# Patient Record
Sex: FEMALE | Race: WHITE | Employment: PART TIME | URBAN - METROPOLITAN AREA
[De-identification: names, ages, dates, MRNs, and addresses within clinical notes are randomized per-mention and may not be internally consistent; named-entity substitution may affect disease eponyms.]

---

## 2017-01-23 ENCOUNTER — TELEPHONE (OUTPATIENT)
Dept: HEMATOLOGY/ONCOLOGY | Facility: HOSPITAL | Age: 62
End: 2017-01-23

## 2017-01-23 NOTE — TELEPHONE ENCOUNTER
Patient phoned requesting apt with radiation oncologist. Assisted with Scheduling for 1/26. Patient wanted to wait until the holidays were over to schedule apt for radiation doctor. Patient instructed to call with any further questions or concerns.

## 2017-01-26 ENCOUNTER — HOSPITAL ENCOUNTER (OUTPATIENT)
Dept: RADIATION ONCOLOGY | Facility: HOSPITAL | Age: 62
Discharge: HOME | End: 2017-01-26
Attending: RADIOLOGY
Payer: COMMERCIAL

## 2017-01-26 VITALS
TEMPERATURE: 98 F | SYSTOLIC BLOOD PRESSURE: 156 MMHG | BODY MASS INDEX: 23 KG/M2 | DIASTOLIC BLOOD PRESSURE: 71 MMHG | OXYGEN SATURATION: 98 % | HEART RATE: 72 BPM | WEIGHT: 128.31 LBS

## 2017-01-26 DIAGNOSIS — D05.12 DUCTAL CARCINOMA IN SITU (DCIS) OF LEFT BREAST: Primary | ICD-10-CM

## 2017-01-26 PROCEDURE — 99214 OFFICE O/P EST MOD 30 MIN: CPT

## 2017-01-26 NOTE — PROGRESS NOTES
Nursing Consultation Note  Patient: Ru Gerber  YOB: 1955  Age: 63year old  Radiation Oncologist: Dr. Rogene Runner  Referring Physician: Obed Mcgregor  Diagnosis:Left breast DCIS  Consult Date: 1/26/2017    History of Pres Breast Cancer Mother 28   • Breast Cancer Sister 52   • Cancer Maternal Grandmother      unknown     Medical History:  Past Medical History   Diagnosis Date   • Dry eye 10/30/2014   • Smoker 10/30/2014   • Cancer [facility])      Cancer - left breast   • Hearing Negative. HENT: Negative. Eyes: Negative. Respiratory: Negative. Cardiovascular: Negative. Gastrointestinal: Negative. Genitourinary: Negative. Musculoskeletal: Negative. Skin: Negative. Neurological: Negative.     Endo/Heme/Aller

## 2017-01-26 NOTE — PATIENT INSTRUCTIONS
- FOLLOW-UP WITH Dr Maddi Barnett as planned    - CT SIMULATION SCHEDULED FOR next week- Thursday 2/2/17 @ 10:00 am.    This is the first step in the radiation planning process    - IF YOU 25 [facility], [address] [place] [address] [phone] RN LINE.

## 2017-01-26 NOTE — CONSULTS
[facility]    PATIENT'S NAME: Amelie Vivarer   RADIATION ONCOLOGIST: Romaan Rodriguez. Og Alejo M.D.    PATIENT ACCOUNT #: [de-identified] Curt Smith   [facility]   MEDICAL RECORD #: [id number] YOB: 1955   CONSULTATION DATE: 01/26/2017       RADIA loss, changes in appetite, bone pain, headaches, nausea, vomiting, or other similar complaints. PAST MEDICAL HISTORY:  Patient has no pertinent past medical history.     PAST SURGICAL HISTORY:  Breast biopsies in the past and the aforementioned lumpectom point.  There is no palpable masses, skin thickening, or nipple discharge within the left breast.  The right breast is also clinically negative.     IMPRESSION:  This is a 70-year-old white female with a history of ductal carcinoma in situ of the left breas pathologic results. From a treatment standpoint, I anticipate using the prone positioning for her treatment.   Though she is rather slender, she does have enough breast tissue that it does make sense to pull the breast away from the chest wall with Laila Amanda

## 2017-02-01 ENCOUNTER — HOSPITAL ENCOUNTER (OUTPATIENT)
Dept: RADIATION ONCOLOGY | Facility: HOSPITAL | Age: 62
Discharge: HOME | End: 2017-02-01
Attending: RADIOLOGY
Payer: COMMERCIAL

## 2017-02-02 ENCOUNTER — HOSPITAL ENCOUNTER (OUTPATIENT)
Dept: RADIATION ONCOLOGY | Facility: HOSPITAL | Age: 62
Discharge: HOME | End: 2017-02-02
Attending: RADIOLOGY
Payer: COMMERCIAL

## 2017-02-02 PROCEDURE — 77290 THER RAD SIMULAJ FIELD CPLX: CPT | Performed by: RADIOLOGY

## 2017-02-02 PROCEDURE — 77333 RADIATION TREATMENT AID(S): CPT | Performed by: RADIOLOGY

## 2017-02-08 PROCEDURE — 77295 3-D RADIOTHERAPY PLAN: CPT | Performed by: RADIOLOGY

## 2017-02-08 PROCEDURE — 77300 RADIATION THERAPY DOSE PLAN: CPT | Performed by: RADIOLOGY

## 2017-02-08 PROCEDURE — 77334 RADIATION TREATMENT AID(S): CPT | Performed by: RADIOLOGY

## 2017-02-13 ENCOUNTER — HOSPITAL ENCOUNTER (OUTPATIENT)
Dept: RADIATION ONCOLOGY | Facility: HOSPITAL | Age: 62
Discharge: HOME | End: 2017-02-13
Attending: RADIOLOGY
Payer: COMMERCIAL

## 2017-02-13 PROCEDURE — 77412 RADIATION TX DELIVERY LVL 3: CPT | Performed by: RADIOLOGY

## 2017-02-13 PROCEDURE — 77280 THER RAD SIMULAJ FIELD SMPL: CPT | Performed by: RADIOLOGY

## 2017-02-13 NOTE — PROGRESS NOTES
[facility] Radiation Treatment Management Note 1-5    Patient:  Amador Fraser  Age:  63year old  Visit Diagnosis:  No diagnosis found.   Primary Rad/Onc:  Dr. Yamilet Langford      Site Delivered Dose (Gy) Prescribed Dose (Gy) Margarito Neal

## 2017-02-14 PROCEDURE — 77412 RADIATION TX DELIVERY LVL 3: CPT | Performed by: RADIOLOGY

## 2017-02-15 PROCEDURE — 77412 RADIATION TX DELIVERY LVL 3: CPT | Performed by: RADIOLOGY

## 2017-02-15 PROCEDURE — 77417 THER RADIOLOGY PORT IMAGE(S): CPT | Performed by: RADIOLOGY

## 2017-02-16 PROCEDURE — 77290 THER RAD SIMULAJ FIELD CPLX: CPT | Performed by: RADIOLOGY

## 2017-02-16 PROCEDURE — 77412 RADIATION TX DELIVERY LVL 3: CPT | Performed by: RADIOLOGY

## 2017-02-17 PROCEDURE — 77336 RADIATION PHYSICS CONSULT: CPT | Performed by: RADIOLOGY

## 2017-02-17 PROCEDURE — 77412 RADIATION TX DELIVERY LVL 3: CPT | Performed by: RADIOLOGY

## 2017-02-20 ENCOUNTER — HOSPITAL ENCOUNTER (OUTPATIENT)
Dept: RADIATION ONCOLOGY | Facility: HOSPITAL | Age: 62
Discharge: HOME | End: 2017-02-20
Attending: RADIOLOGY
Payer: COMMERCIAL

## 2017-02-20 VITALS
DIASTOLIC BLOOD PRESSURE: 70 MMHG | TEMPERATURE: 98 F | HEART RATE: 80 BPM | OXYGEN SATURATION: 95 % | SYSTOLIC BLOOD PRESSURE: 122 MMHG

## 2017-02-20 DIAGNOSIS — D05.12 DUCTAL CARCINOMA IN SITU (DCIS) OF LEFT BREAST: Primary | ICD-10-CM

## 2017-02-20 PROCEDURE — 77412 RADIATION TX DELIVERY LVL 3: CPT | Performed by: RADIOLOGY

## 2017-02-20 NOTE — PROGRESS NOTES
[facility] Radiation Treatment Management Note 6-10    Patient:  Ru Gerber  Age:  63year old  Visit Diagnosis:    1.  Ductal carcinoma in situ (DCIS) of left breast      Primary Rad/Onc:  Dr. Rogene Runner      Site Delivered D

## 2017-02-21 PROCEDURE — 77412 RADIATION TX DELIVERY LVL 3: CPT | Performed by: RADIOLOGY

## 2017-02-22 PROCEDURE — 77412 RADIATION TX DELIVERY LVL 3: CPT | Performed by: RADIOLOGY

## 2017-02-23 PROCEDURE — 77417 THER RADIOLOGY PORT IMAGE(S): CPT | Performed by: RADIOLOGY

## 2017-02-23 PROCEDURE — 77412 RADIATION TX DELIVERY LVL 3: CPT | Performed by: RADIOLOGY

## 2017-02-24 PROCEDURE — 77336 RADIATION PHYSICS CONSULT: CPT | Performed by: RADIOLOGY

## 2017-02-24 PROCEDURE — 77412 RADIATION TX DELIVERY LVL 3: CPT | Performed by: RADIOLOGY

## 2017-02-27 ENCOUNTER — HOSPITAL ENCOUNTER (OUTPATIENT)
Dept: RADIATION ONCOLOGY | Facility: HOSPITAL | Age: 62
Discharge: HOME | End: 2017-02-27
Attending: RADIOLOGY
Payer: COMMERCIAL

## 2017-02-27 VITALS
TEMPERATURE: 99 F | RESPIRATION RATE: 20 BRPM | HEART RATE: 71 BPM | SYSTOLIC BLOOD PRESSURE: 142 MMHG | DIASTOLIC BLOOD PRESSURE: 77 MMHG

## 2017-02-27 DIAGNOSIS — D05.12 DUCTAL CARCINOMA IN SITU (DCIS) OF LEFT BREAST: Primary | ICD-10-CM

## 2017-02-27 PROCEDURE — 77412 RADIATION TX DELIVERY LVL 3: CPT | Performed by: RADIOLOGY

## 2017-02-27 NOTE — PROGRESS NOTES
[facility] Radiation Treatment Management Note 11-15    Patient:  Keya Pablo  Age:  63year old  Visit Diagnosis:      1.  Ductal carcinoma in situ (DCIS) of left breast      Primary Rad/Onc:  Dr. Alaina Salvador

## 2017-02-28 PROCEDURE — 77412 RADIATION TX DELIVERY LVL 3: CPT | Performed by: RADIOLOGY

## 2017-03-01 ENCOUNTER — HOSPITAL ENCOUNTER (OUTPATIENT)
Dept: RADIATION ONCOLOGY | Facility: HOSPITAL | Age: 62
Discharge: HOME | End: 2017-03-01
Attending: RADIOLOGY
Payer: COMMERCIAL

## 2017-03-01 PROCEDURE — 77412 RADIATION TX DELIVERY LVL 3: CPT | Performed by: RADIOLOGY

## 2017-03-02 PROCEDURE — 77417 THER RADIOLOGY PORT IMAGE(S): CPT | Performed by: RADIOLOGY

## 2017-03-02 PROCEDURE — 77412 RADIATION TX DELIVERY LVL 3: CPT | Performed by: RADIOLOGY

## 2017-03-03 PROCEDURE — 77412 RADIATION TX DELIVERY LVL 3: CPT | Performed by: RADIOLOGY

## 2017-03-03 NOTE — PATIENT INSTRUCTIONS
MAKE AN APPOINTMENT TO SEE DR FÁTIMA MCNEILL IN 1 MONTH, April 2017     CALL TO SCHEDULE AN [facility]MENT [facility] CAN[facility]SH[facility] [phone]    FOLLOW UP WITH DR Bony Franklin IN 6 MONTHS AUGUST 2017: LEFT SIDED MAMMOGRAM TO BE DONE IN

## 2017-03-06 ENCOUNTER — HOSPITAL ENCOUNTER (OUTPATIENT)
Dept: RADIATION ONCOLOGY | Facility: HOSPITAL | Age: 62
Discharge: HOME | End: 2017-03-06
Attending: RADIOLOGY
Payer: COMMERCIAL

## 2017-03-06 VITALS
SYSTOLIC BLOOD PRESSURE: 145 MMHG | HEART RATE: 64 BPM | TEMPERATURE: 99 F | DIASTOLIC BLOOD PRESSURE: 71 MMHG | RESPIRATION RATE: 20 BRPM

## 2017-03-06 DIAGNOSIS — D05.12 DUCTAL CARCINOMA IN SITU (DCIS) OF LEFT BREAST: Primary | ICD-10-CM

## 2017-03-06 PROCEDURE — 77412 RADIATION TX DELIVERY LVL 3: CPT | Performed by: RADIOLOGY

## 2017-03-06 NOTE — PROGRESS NOTES
[facility] Radiation Treatment Management Note 16-20    Patient:  Octavio Gleason  Age:  63year old  Visit Diagnosis:      1.  Ductal carcinoma in situ (DCIS) of left breast      Primary Rad/Onc:  Dr. Arnulfo Maldonado

## 2017-03-10 PROCEDURE — 77336 RADIATION PHYSICS CONSULT: CPT | Performed by: RADIOLOGY

## 2017-03-13 ENCOUNTER — TELEPHONE (OUTPATIENT)
Dept: RADIATION ONCOLOGY | Facility: HOSPITAL | Age: 62
End: 2017-03-13

## 2017-03-13 DIAGNOSIS — L30.9 DERMATITIS: Primary | ICD-10-CM

## 2017-03-13 RX ORDER — MOMETASONE FUROATE 1 MG/G
CREAM TOPICAL
Qty: 50 G | Refills: 3 | Status: SHIPPED | OUTPATIENT
Start: 2017-03-13 | End: 2017-06-05 | Stop reason: ALTCHOICE

## 2017-03-13 NOTE — TELEPHONE ENCOUNTER
Pt left a message asking for prescription strength cream for itching. She has been using hydrocortisone for a week, with no relief.

## 2017-03-14 NOTE — PROGRESS NOTES
[facility]    PATIENT'S NAME: Chano Becker   RADIATION ONCOLOGIST: Mata Eric. Maged Hunter M.D.    PATIENT ACCOUNT #: [de-identified] [facility]   MEDICAL RECORD #: [id number] YOB: 1955   DATE: 03/06/2017       RADIATION ONCOLOGY treatment. However, I did think it reasonable to treat with hypofractionated radiation, and I saw no need for a boost given the fact that the tumor was apparently completely removed at biopsy.   I, therefore, treated her with the dose and fractionation chloe

## 2017-03-30 ENCOUNTER — HOSPITAL ENCOUNTER (OUTPATIENT)
Dept: RADIATION ONCOLOGY | Facility: HOSPITAL | Age: 62
Discharge: HOME | End: 2017-03-30
Attending: RADIOLOGY
Payer: COMMERCIAL

## 2017-03-30 VITALS
RESPIRATION RATE: 20 BRPM | WEIGHT: 130 LBS | BODY MASS INDEX: 24 KG/M2 | SYSTOLIC BLOOD PRESSURE: 142 MMHG | DIASTOLIC BLOOD PRESSURE: 70 MMHG | TEMPERATURE: 99 F

## 2017-03-30 DIAGNOSIS — D05.12 DUCTAL CARCINOMA IN SITU (DCIS) OF LEFT BREAST: Primary | ICD-10-CM

## 2017-03-30 PROCEDURE — 99213 OFFICE O/P EST LOW 20 MIN: CPT

## 2017-03-30 NOTE — PROGRESS NOTES
Pt here for routine 1 month follow up for previously treated DCIS of the left breast.   She completed hypo-fractionated radiation, last tx 3/6/17. She is feeling good. She did need elocon for 3-4 days, that did help.    Vanicream and aquaphor irritated

## 2017-03-30 NOTE — PATIENT INSTRUCTIONS
Follow up with Dr Adrian Becker in October 2017. RN desk [phone] with any questions    Left sided mammogram is due June, prior to your appointment with Dr Daysi Little     See Dr Daysi Little as scheduled in June.

## 2017-04-04 NOTE — PROGRESS NOTES
[facility]    PATIENT'S NAME: Urvashi Azul   RADIATION ONCOLOGIST: Marek Huizar. Angeline Gaitan M.D.    PATIENT ACCOUNT #: [de-identified] [facility]   MEDICAL RECORD #: [id number] YOB: 1955   FOLLOW-UP DATE: 03/30/2017       RADIATI the upper inner quadrant but states that she is otherwise well healed. She denies any breast pain, swelling, or tenderness. She is scheduled to get a left-sided mammogram in a few months, and the order is  already in the system.     PHYSICAL EXAMINATIO 09:39:12  t: 04/04/2017 05:27:44  [facility] [id number]  NAD/    cc: [facility].  FRANCISCO JAVIER Woods M.D.

## 2017-04-13 ENCOUNTER — TELEPHONE (OUTPATIENT)
Dept: HEMATOLOGY/ONCOLOGY | Facility: HOSPITAL | Age: 62
End: 2017-04-13

## 2017-06-05 ENCOUNTER — OFFICE VISIT (OUTPATIENT)
Dept: FAMILY MEDICINE CLINIC | Facility: CLINIC | Age: 62
End: 2017-06-05

## 2017-06-05 VITALS
OXYGEN SATURATION: 98 % | HEART RATE: 72 BPM | HEIGHT: 62 IN | WEIGHT: 130 LBS | SYSTOLIC BLOOD PRESSURE: 130 MMHG | TEMPERATURE: 99 F | RESPIRATION RATE: 16 BRPM | DIASTOLIC BLOOD PRESSURE: 70 MMHG | BODY MASS INDEX: 23.92 KG/M2

## 2017-06-05 DIAGNOSIS — H65.92 MIDDLE EAR EFFUSION, LEFT: Primary | ICD-10-CM

## 2017-06-05 PROCEDURE — 99213 OFFICE O/P EST LOW 20 MIN: CPT | Performed by: NURSE PRACTITIONER

## 2017-06-05 NOTE — PROGRESS NOTES
CHIEF COMPLAINT:   Patient presents with:  Ear Pain: feels like fluid in ears      HPI:   Andrew Matthew is a 63year old female who presents to clinic today with complaints of intermittent bilat ear pain, left > right. Has had for 3  days.  Pain is d SKIN: no rashes,no suspicious lesions  HEAD: atraumatic, normocephalic  EYES: conjunctiva clear, EOM intact  EARS: Tragus non tender on palpation bilaterally. External auditory canals healthy.    Bilat TMs: , clear gray, no bulging, no retraction, clear eff The pain or discomfort may come and go. You may hear clicking or popping sounds when you chew or swallow. You may feel that your balance is off. Or you may hear ringing in the ear.   It often takes from several weeks up to 3 months for the fluid to clear on · Unusual drowsiness or confusion  Date Last Reviewed: 10/1/2016  © 7001-2602 The [address] [place] [address], [facility] [place]. All rights reserved. This information is not intended as a substitute for professional medical care.  Always

## 2017-06-12 ENCOUNTER — HOSPITAL ENCOUNTER (OUTPATIENT)
Dept: MAMMOGRAPHY | Facility: HOSPITAL | Age: 62
Discharge: HOME | End: 2017-06-12
Attending: SURGERY
Payer: COMMERCIAL

## 2017-06-12 DIAGNOSIS — D05.12 DUCTAL CARCINOMA IN SITU (DCIS) OF LEFT BREAST: ICD-10-CM

## 2017-06-12 PROCEDURE — 77061 BREAST TOMOSYNTHESIS UNI: CPT | Performed by: SURGERY

## 2017-06-12 PROCEDURE — 77065 DX MAMMO INCL CAD UNI: CPT | Performed by: SURGERY

## 2017-06-23 ENCOUNTER — OFFICE VISIT (OUTPATIENT)
Dept: SURGERY | Facility: CLINIC | Age: 62
End: 2017-06-23

## 2017-06-23 VITALS
DIASTOLIC BLOOD PRESSURE: 77 MMHG | SYSTOLIC BLOOD PRESSURE: 131 MMHG | RESPIRATION RATE: 18 BRPM | WEIGHT: 131 LBS | BODY MASS INDEX: 24 KG/M2 | HEART RATE: 64 BPM | OXYGEN SATURATION: 100 %

## 2017-06-23 DIAGNOSIS — Z98.890 HISTORY OF RIGHT BREAST BIOPSY: ICD-10-CM

## 2017-06-23 DIAGNOSIS — D05.12 DUCTAL CARCINOMA IN SITU (DCIS) OF LEFT BREAST: Primary | ICD-10-CM

## 2017-06-23 PROCEDURE — 99214 OFFICE O/P EST MOD 30 MIN: CPT | Performed by: SURGERY

## 2017-06-23 NOTE — PROGRESS NOTES
Breast Surgery Surveillance Visit    Diagnosis: DCIS, left breast; ER negative, [place] negative status post Left lumpectomy on December 12, 2016    Stage: TisNxMx (Stage 0)    Disease Status:  Surgical treatment complete, Radiation therapy completed March 2017 concerns related to the breast. She has completed RT without complication. She is here today for evaluation and recommendations for further therapy.         Past Medical History   Diagnosis Date   • Dry eye 10/30/2014   • Smoker 10/30/2014   • Cancer ([facility] [address] unknown   She does not know if she is of Ashkenazi Sabianism ancestry.     Social History:    Alcohol Use: Yes   Comment: special occasions       Smoking status: Current Every Day Smoker 1.00 Packs/Day For 41.00 Years   Types: Cigarettes   Smokeless tobacco: N change in skin color or change in moles. Hematologic/Lymphatic:  The patient denies easily bruising or bleeding or persistent swollen glands or lymph nodes.      Musculoskeletal:  The patient denies muscle aches/pain, joint pain, stiff joints, neck pain family history of breast cancer. Discussion and Plan:  I had a discussion with the Patient regarding her breast exam. On exam today, she is healing well with no evidence of new or recurrent disease.  I personally reviewed her imaging which is [facility]

## 2017-10-25 ENCOUNTER — HOSPITAL ENCOUNTER (OUTPATIENT)
Dept: RADIATION ONCOLOGY | Facility: HOSPITAL | Age: 62
Discharge: HOME | End: 2017-10-25
Attending: RADIOLOGY
Payer: COMMERCIAL

## 2017-10-25 VITALS
BODY MASS INDEX: 24 KG/M2 | SYSTOLIC BLOOD PRESSURE: 143 MMHG | HEART RATE: 59 BPM | DIASTOLIC BLOOD PRESSURE: 78 MMHG | OXYGEN SATURATION: 100 % | RESPIRATION RATE: 17 BRPM | WEIGHT: 130.38 LBS

## 2017-10-25 DIAGNOSIS — D05.12 DUCTAL CARCINOMA IN SITU (DCIS) OF LEFT BREAST: ICD-10-CM

## 2017-10-25 PROCEDURE — 99213 OFFICE O/P EST LOW 20 MIN: CPT

## 2017-10-25 NOTE — PATIENT INSTRUCTIONS
- CALL [phone] FOR A FOLLOW-UP WITH DR. Sweeney Los Dr. Bony Franklin AS SCHEDULED IN DECEMBER  - CALL [phone] TO SCHEDULE YOUR MAMMOGRAM FOR DECEMBER  - CALL IF YOU HAVE ANY QUESTIONS/CONCERNS REGARDING RADIATION THERAPY AT

## 2017-10-25 NOTE — PROGRESS NOTES
Nursing Follow-Up Note    Patient: Santhosh Mitchell  YOB: 1955  Age: 63year old  Radiation Oncologist: Dr. Yanique Ibarra  Referring Physician: Dr. Kristina Tellez  Chief Complaint: Breast Cancer  Date: 10/25/2017        Vital Signs: /78 (B

## 2017-11-01 NOTE — PROGRESS NOTES
[address]    PATIENT'S NAME: Juan Beaver   RADIATION ONCOLOGIST: Suze Raya. Navin Kumar M.D.    [facility]   MEDICAL RECORD #: [id number] YOB: 1955   FOLLOW-UP DATE: 10/25/2017       RADIATI negative. She is already scheduled on December 11 for repeat mammography. PHYSICAL EXAMINATION:    VITAL SIGNS:  Blood pressure 143/78, pulse 59, respiratory rate 17, and she is afebrile. Weight is stable at 130 pounds.   NECK:  Supple, with no lymphad

## 2017-12-11 ENCOUNTER — HOSPITAL ENCOUNTER (OUTPATIENT)
Dept: MAMMOGRAPHY | Facility: HOSPITAL | Age: 62
Discharge: HOME | End: 2017-12-11
Attending: SURGERY
Payer: COMMERCIAL

## 2017-12-11 DIAGNOSIS — D05.12 DUCTAL CARCINOMA IN SITU (DCIS) OF LEFT BREAST: ICD-10-CM

## 2017-12-11 PROCEDURE — 77062 BREAST TOMOSYNTHESIS BI: CPT | Performed by: SURGERY

## 2017-12-11 PROCEDURE — 77066 DX MAMMO INCL CAD BI: CPT | Performed by: SURGERY

## 2017-12-15 ENCOUNTER — OFFICE VISIT (OUTPATIENT)
Dept: SURGERY | Facility: CLINIC | Age: 62
End: 2017-12-15

## 2017-12-15 VITALS
BODY MASS INDEX: 25 KG/M2 | TEMPERATURE: 98 F | WEIGHT: 134 LBS | DIASTOLIC BLOOD PRESSURE: 85 MMHG | HEART RATE: 83 BPM | RESPIRATION RATE: 24 BRPM | SYSTOLIC BLOOD PRESSURE: 145 MMHG

## 2017-12-15 DIAGNOSIS — D05.12 DUCTAL CARCINOMA IN SITU (DCIS) OF LEFT BREAST: Primary | ICD-10-CM

## 2017-12-15 PROCEDURE — 99214 OFFICE O/P EST MOD 30 MIN: CPT | Performed by: SURGERY

## 2018-03-05 NOTE — PROGRESS NOTES
Breast Surgery Surveillance Visit    Diagnosis: DCIS, left breast; ER negative, [place] negative status post Left lumpectomy on December 12, 2016    Stage: TisNxMx (Stage 0)    Disease Status:  Surgical treatment complete, Radiation therapy completed March 2017 concerns related to the breast. She has completed RT without complication. She is here today for evaluation and recommendations for further therapy.         Past Medical History:   Diagnosis Date   • Cancer [facility])     Cancer - left breast   • Dry eye 10/30/2 Ashkenazi Confucianist ancestry. Social History:    Alcohol use Yes   Comment: special occasions       Smoking status: Current Every Day Smoker 1.00 Packs/day For 41.00 Years   Types: Cigarettes   Smokeless tobacco: Never Used   The patient is .  She ha Hematologic/Lymphatic:  The patient denies easily bruising or bleeding or persistent swollen glands or lymph nodes. Musculoskeletal:  The patient denies muscle aches/pain, joint pain, stiff joints, neck pain, back pain or bone pain.     Neuropsychia biopsy, and Left lumpectomy for Left breast DCIS with a family history of breast cancer.      Discussion and Plan:  I had a discussion with the Patient regarding her breast exam. On exam today, she is healing well with no evidence of new or recurrent diseas

## 2018-06-04 ENCOUNTER — HOSPITAL ENCOUNTER (OUTPATIENT)
Dept: MAMMOGRAPHY | Facility: HOSPITAL | Age: 63
Discharge: HOME | End: 2018-06-04
Attending: SURGERY
Payer: COMMERCIAL

## 2018-06-04 DIAGNOSIS — D05.12 DUCTAL CARCINOMA IN SITU (DCIS) OF LEFT BREAST: ICD-10-CM

## 2018-06-04 PROCEDURE — 77065 DX MAMMO INCL CAD UNI: CPT | Performed by: SURGERY

## 2018-06-04 PROCEDURE — 77061 BREAST TOMOSYNTHESIS UNI: CPT | Performed by: SURGERY

## 2018-06-07 ENCOUNTER — HOSPITAL ENCOUNTER (OUTPATIENT)
Dept: RADIATION ONCOLOGY | Facility: HOSPITAL | Age: 63
Discharge: HOME | End: 2018-06-07
Attending: RADIOLOGY
Payer: COMMERCIAL

## 2018-06-07 VITALS — SYSTOLIC BLOOD PRESSURE: 118 MMHG | DIASTOLIC BLOOD PRESSURE: 70 MMHG | HEART RATE: 80 BPM

## 2018-06-07 DIAGNOSIS — D05.12 DUCTAL CARCINOMA IN SITU (DCIS) OF LEFT BREAST: Primary | ICD-10-CM

## 2018-06-07 NOTE — PATIENT INSTRUCTIONS
- CALL [phone] FOR A FOLLOW-UP WITH DR. Alexa Briscoe in 6 months   - FOLLOW-UP WITH Dr Sofia Shone as scheduled 6/22  - CALL IF YOU HAVE ANY QUESTIONS/CONCERNS REGARDING RADIATION THERAPY [phone]

## 2018-06-07 NOTE — PROGRESS NOTES
Nursing Follow-Up Note    Patient: Amador Fraser  YOB: 1955  Age: 63year old  Radiation Oncologist: Dr. Yamilet Langford  Referring Physician: No ref. provider found  Chief Complaint: Patient presents with:   Follow - Up    Date: 6/7/201

## 2018-06-22 ENCOUNTER — OFFICE VISIT (OUTPATIENT)
Dept: SURGERY | Facility: CLINIC | Age: 63
End: 2018-06-22

## 2018-06-22 VITALS
RESPIRATION RATE: 20 BRPM | WEIGHT: 134.5 LBS | OXYGEN SATURATION: 100 % | BODY MASS INDEX: 24.75 KG/M2 | DIASTOLIC BLOOD PRESSURE: 84 MMHG | SYSTOLIC BLOOD PRESSURE: 127 MMHG | HEIGHT: 62 IN | HEART RATE: 68 BPM

## 2018-06-22 DIAGNOSIS — D05.12 BREAST NEOPLASM, TIS (DCIS), LEFT: Primary | ICD-10-CM

## 2018-06-22 PROCEDURE — 99214 OFFICE O/P EST MOD 30 MIN: CPT | Performed by: SURGERY

## 2018-06-25 NOTE — PROGRESS NOTES
Breast Surgery Surveillance Visit    Diagnosis: DCIS, left breast; ER negative, [place] negative status post Left lumpectomy on December 12, 2016    Stage: TisNxMx (Stage 0)    Disease Status:  Surgical treatment complete, Radiation therapy completed March 2017 concerns related to the breast. She has completed RT without complication. She is here today for evaluation and recommendations for further therapy.         Past Medical History:   Diagnosis Date   • Cancer [facility])     Cancer - left breast   • Dry eye 10/30/2 Ashkenazi Yazidi ancestry. Social History:    Alcohol use Yes   Comment: special occasions       Smoking status: Current Every Day Smoker 1.00 Packs/day For 41.00 Years   Types: Cigarettes   Smokeless tobacco: Never Used   The patient is .  She ha Hematologic/Lymphatic:  The patient denies easily bruising or bleeding or persistent swollen glands or lymph nodes. Musculoskeletal:  The patient denies muscle aches/pain, joint pain, stiff joints, neck pain, back pain or bone pain.     Neuropsychia Right breast biopsy, and Left lumpectomy for Left breast DCIS with a family history of breast cancer.      Discussion and Plan:  I had a discussion with the Patient regarding her breast exam. On exam today, she is healing well with no evidence of new or rec

## 2018-06-27 NOTE — PROGRESS NOTES
[facility]    PATIENT'S NAME: Chano Vidal   RADIATION ONCOLOGIST: Mariya Ramos. Robby Singh M.D.    PATIENT ACCOUNT #: [de-identified] [facility]   MEDICAL RECORD #: [id number] YOB: 1955   FOLLOW-UP DATE: 06/07/2018       RADIATI is afebrile. She has a pain score of 0. NECK:  Supple with no lymphadenopathy. LUNGS:  Clear to auscultation bilaterally. HEART:  Cardiac exam is regular rate and rhythm. Normal S1, S2, and no audible murmurs.   LYMPHATICS:  There is no supraclavicular

## 2018-11-04 ENCOUNTER — OFFICE VISIT (OUTPATIENT)
Dept: FAMILY MEDICINE CLINIC | Facility: CLINIC | Age: 63
End: 2018-11-04
Payer: COMMERCIAL

## 2018-11-04 VITALS
SYSTOLIC BLOOD PRESSURE: 112 MMHG | OXYGEN SATURATION: 96 % | HEART RATE: 84 BPM | DIASTOLIC BLOOD PRESSURE: 82 MMHG | TEMPERATURE: 99 F

## 2018-11-04 DIAGNOSIS — M54.50 ACUTE BILATERAL LOW BACK PAIN WITHOUT SCIATICA: Primary | ICD-10-CM

## 2018-11-04 PROCEDURE — 99213 OFFICE O/P EST LOW 20 MIN: CPT | Performed by: NURSE PRACTITIONER

## 2018-11-04 RX ORDER — CYCLOBENZAPRINE HCL 5 MG
5 TABLET ORAL 3 TIMES DAILY PRN
Qty: 30 TABLET | Refills: 0 | Status: SHIPPED | OUTPATIENT
Start: 2018-11-04 | End: 2018-11-14 | Stop reason: ALTCHOICE

## 2018-11-04 RX ORDER — CYCLOBENZAPRINE HCL 5 MG
5 TABLET ORAL 3 TIMES DAILY PRN
Qty: 30 TABLET | Refills: 0 | Status: SHIPPED | OUTPATIENT
Start: 2018-11-04 | End: 2018-11-04

## 2018-11-04 NOTE — PROGRESS NOTES
HPI:   Tamika Gasca is a 63year old female who is here for complaints of back pain. Pain is located in a band across lower back. Pain is described as tight and intense. Denies issues with voiding. No numbness, tingling or changes in sensation.  Re specimen radiography.    • NEEDLE BIOPSY RIGHT Right 11/2015    benign-complex apocrine cysts   • RADIATION LEFT         Family History   Problem Relation Age of Onset   • Breast Cancer Mother 28   • Breast Cancer Sister 52   • Cancer Maternal Grandmother

## 2018-11-04 NOTE — PATIENT INSTRUCTIONS
-ibuprofen as needed.  Take with food  -warm compress as needed      Back Spasm (No Trauma)    Spasm of the back muscles can occur after a sudden forceful twisting or bending force (such as in a car accident), after a simple awkward movement, or after lifti to 90 minutes or several times a day. Do not sleep on a heating pad as it can burn or damage skin. · Alternate ice and heat therapies. · Be aware of safe lifting methods and do not lift anything over 15 pounds until all the pain is gone.   Gentle stretchi slow heart rate  · Loss of bowel or bladder control  When to seek medical advice  Call your healthcare provider right away if any of these occur:  · Pain becomes worse or spreads to your legs  · Weakness or numbness in one or both legs  · Numbness in the g

## 2018-11-14 ENCOUNTER — OFFICE VISIT (OUTPATIENT)
Dept: INTERNAL MEDICINE CLINIC | Facility: CLINIC | Age: 63
End: 2018-11-14
Payer: COMMERCIAL

## 2018-11-14 VITALS
TEMPERATURE: 99 F | SYSTOLIC BLOOD PRESSURE: 138 MMHG | BODY MASS INDEX: 24.54 KG/M2 | WEIGHT: 133.38 LBS | HEART RATE: 84 BPM | DIASTOLIC BLOOD PRESSURE: 72 MMHG | HEIGHT: 62 IN

## 2018-11-14 DIAGNOSIS — Z13.220 LIPID SCREENING: ICD-10-CM

## 2018-11-14 DIAGNOSIS — Z13.1 DIABETES MELLITUS SCREENING: ICD-10-CM

## 2018-11-14 DIAGNOSIS — G89.29 CHRONIC RIGHT-SIDED LOW BACK PAIN WITHOUT SCIATICA: Primary | ICD-10-CM

## 2018-11-14 DIAGNOSIS — Z72.0 TOBACCO USE: ICD-10-CM

## 2018-11-14 DIAGNOSIS — Z13.0 SCREENING FOR BLOOD DISEASE: ICD-10-CM

## 2018-11-14 DIAGNOSIS — M54.50 CHRONIC RIGHT-SIDED LOW BACK PAIN WITHOUT SCIATICA: Primary | ICD-10-CM

## 2018-11-14 DIAGNOSIS — Z13.29 THYROID DISORDER SCREEN: ICD-10-CM

## 2018-11-14 PROCEDURE — 99203 OFFICE O/P NEW LOW 30 MIN: CPT | Performed by: PHYSICIAN ASSISTANT

## 2018-11-14 RX ORDER — METHYLPREDNISOLONE 4 MG/1
TABLET ORAL
Qty: 1 KIT | Refills: 0 | Status: SHIPPED | OUTPATIENT
Start: 2018-11-14 | End: 2019-01-16

## 2018-11-14 NOTE — PROGRESS NOTES
Patient presents with:  Low Back Pain: LG. Room 2. Right sided low back and hip pain for 4 weeks.  Its worse in the morning and gets better during the day       HPI:  Pt presents as a new patient with c/o R sided low back pain with radiation into her R butt 11/2015    benign-complex apocrine cysts   • RADIATION LEFT       Social History    Tobacco Use      Smoking status: Current Every Day Smoker        Packs/day: 1.00        Years: 41.00        Pack years: 39        Types: Cigarettes      Smokeless tobacco: sounds are normal. Non tender, no masses, no organomegaly or hernias. MS:  Lumbar spine:  AROM is decreased in flexion to knees. Reproduction of pain with R lateral bending and twisting. Strength 5/5 in B LEs in all directions.   Lt touch sensation intac

## 2018-12-05 DIAGNOSIS — D05.12 DUCTAL CARCINOMA IN SITU (DCIS) OF LEFT BREAST: Primary | ICD-10-CM

## 2018-12-06 ENCOUNTER — HOSPITAL ENCOUNTER (OUTPATIENT)
Dept: MAMMOGRAPHY | Facility: HOSPITAL | Age: 63
Discharge: HOME | End: 2018-12-06
Attending: SURGERY
Payer: COMMERCIAL

## 2018-12-06 DIAGNOSIS — D05.12 BREAST NEOPLASM, TIS (DCIS), LEFT: ICD-10-CM

## 2018-12-06 PROCEDURE — 77062 BREAST TOMOSYNTHESIS BI: CPT | Performed by: SURGERY

## 2018-12-06 PROCEDURE — 77066 DX MAMMO INCL CAD BI: CPT | Performed by: SURGERY

## 2018-12-13 ENCOUNTER — HOSPITAL ENCOUNTER (OUTPATIENT)
Dept: RADIATION ONCOLOGY | Facility: HOSPITAL | Age: 63
Discharge: HOME | End: 2018-12-13
Attending: RADIOLOGY
Payer: COMMERCIAL

## 2018-12-13 VITALS
TEMPERATURE: 98 F | HEART RATE: 74 BPM | SYSTOLIC BLOOD PRESSURE: 154 MMHG | RESPIRATION RATE: 18 BRPM | DIASTOLIC BLOOD PRESSURE: 75 MMHG | OXYGEN SATURATION: 98 %

## 2018-12-13 DIAGNOSIS — D05.12 DUCTAL CARCINOMA IN SITU (DCIS) OF LEFT BREAST: Primary | ICD-10-CM

## 2018-12-13 PROCEDURE — 99213 OFFICE O/P EST LOW 20 MIN: CPT

## 2018-12-13 NOTE — PROGRESS NOTES
Nursing Follow-Up Note    Patient: Jessika Gerber  YOB: 1955  Age: 66year old  Radiation Oncologist: Dr. Talon Corrigan  Referring Physician: No ref. provider found  Chief Complaint: Patient presents with:   Follow - Up    Date: 12/13/2

## 2018-12-13 NOTE — PATIENT INSTRUCTIONS
- CALL [phone] FOR A FOLLOW-UP WITH DR. FÁTIMA MCNEILL IN 9 months  - FOLLOW-UP WITH Dr King Seals as planned   - CALL IF YOU HAVE ANY QUESTIONS/CONCERNS REGARDING RADIATION THERAPY 21

## 2018-12-14 NOTE — PROGRESS NOTES
[address] [place]    PATIENT'S NAME: Constance Liang   RADIATION ONCOLOGIST: Celine Barnett. Alexa Briscoe M.D.    PATIENT ACCOUNT #: [de-identified] Rishi Franco   [facility]   MEDICAL RECORD #: [id number] YOB: 1955   FOLLOW-UP DATE: 12/13/2018       RADIATI respiratory rate 18, and temperature of 98.0. She has a pain score of 0. NECK:  Supple with no lymphadenopathy. LUNGS:  Clear to auscultation bilaterally. HEART:  Regular rate and rhythm. Normal S1, S2. No audible murmurs.   LYMPHATICS:  No supraclavi 05:45:33  [facility] [id number]  NAD/    cc: Venkatesh Bray M.D. [facility].  Lupe Kate M.D.

## 2018-12-19 ENCOUNTER — LAB ENCOUNTER (OUTPATIENT)
Dept: LAB | Age: 63
End: 2018-12-19
Attending: PHYSICIAN ASSISTANT
Payer: COMMERCIAL

## 2018-12-19 DIAGNOSIS — Z13.220 LIPID SCREENING: ICD-10-CM

## 2018-12-19 DIAGNOSIS — Z13.0 SCREENING FOR BLOOD DISEASE: ICD-10-CM

## 2018-12-19 DIAGNOSIS — Z13.1 DIABETES MELLITUS SCREENING: ICD-10-CM

## 2018-12-19 DIAGNOSIS — Z13.29 THYROID DISORDER SCREEN: ICD-10-CM

## 2018-12-19 PROCEDURE — 36415 COLL VENOUS BLD VENIPUNCTURE: CPT | Performed by: PHYSICIAN ASSISTANT

## 2018-12-19 PROCEDURE — 80061 LIPID PANEL: CPT | Performed by: PHYSICIAN ASSISTANT

## 2018-12-19 PROCEDURE — 80050 GENERAL HEALTH PANEL: CPT | Performed by: PHYSICIAN ASSISTANT

## 2018-12-19 NOTE — PROGRESS NOTES
These labs were ordered in anticipation of CPE in early 2019. I do not see that she has scheduled yet. Chol is borderline and we can discuss that at next OV.   Otherwise labs are normal.

## 2019-01-04 ENCOUNTER — OFFICE VISIT (OUTPATIENT)
Dept: SURGERY | Facility: CLINIC | Age: 64
End: 2019-01-04
Payer: COMMERCIAL

## 2019-01-04 VITALS
RESPIRATION RATE: 18 BRPM | HEIGHT: 62 IN | SYSTOLIC BLOOD PRESSURE: 129 MMHG | OXYGEN SATURATION: 99 % | BODY MASS INDEX: 24.48 KG/M2 | WEIGHT: 133 LBS | DIASTOLIC BLOOD PRESSURE: 79 MMHG | HEART RATE: 72 BPM

## 2019-01-04 DIAGNOSIS — D05.12 NEOPLASM OF LEFT BREAST, PRIMARY TUMOR STAGING CATEGORY TIS: DUCTAL CARCINOMA IN SITU (DCIS): Primary | ICD-10-CM

## 2019-01-04 PROCEDURE — 99214 OFFICE O/P EST MOD 30 MIN: CPT | Performed by: SURGERY

## 2019-01-05 NOTE — PROGRESS NOTES
Breast Surgery Surveillance Visit    Diagnosis: DCIS, left breast; ER negative, [place] negative status post Left lumpectomy on December 12, 2016    Stage: TisNxMx (Stage 0)    Disease Status:  Surgical treatment complete, Radiation therapy completed March 2017 concerns related to the breast. She has completed RT without complication. She has no new concerns related to bilateral breasts on exam today. She is here today for evaluation and recommendations for further therapy.         Past Medical History:   Denece Maria R nightly. Disp:  Rfl:      No current facility-administered medications on file prior to visit.      Allergies:    No Known Allergies     Family History   Problem Relation Age of Onset   • Breast Cancer Mother 28   • Breast Cancer Sister 51-She tested BRCA n urination, needing to get up at night to urinate, urinary hesitancy or retaining urine, painful urination, urinary incontinence, decreased urine stream, blood in the urine or vaginal/penile discharge.     Skin:    The patient denies rash, itching, skin lesi palpable masses or organomegaly. Imaging:  Her most recent imaging was performed on December 6, 2018. This bilateral study shows no evidence of new or recurrent malignancy. There are post treatment changes on the left.      Impression:   MsSevero Daya Acsamantha

## 2019-01-15 ENCOUNTER — TELEPHONE (OUTPATIENT)
Dept: SURGERY | Facility: CLINIC | Age: 64
End: 2019-01-15

## 2019-01-15 NOTE — TELEPHONE ENCOUNTER
Left message on Rosaura Chicas voicemail to review new findings from [facility] gentic testing results. Dr. Robbie Cabrera recommends Rosaura Chicas call patient and follow up with Dr. Robbie Cabrera in 6 months. Provided my contact information for any questions.

## 2019-01-16 ENCOUNTER — OFFICE VISIT (OUTPATIENT)
Dept: INTERNAL MEDICINE CLINIC | Facility: CLINIC | Age: 64
End: 2019-01-16
Payer: COMMERCIAL

## 2019-01-16 VITALS
TEMPERATURE: 99 F | SYSTOLIC BLOOD PRESSURE: 145 MMHG | WEIGHT: 133.81 LBS | HEIGHT: 62 IN | BODY MASS INDEX: 24.63 KG/M2 | DIASTOLIC BLOOD PRESSURE: 73 MMHG | RESPIRATION RATE: 18 BRPM | HEART RATE: 80 BPM

## 2019-01-16 DIAGNOSIS — Z12.11 COLON CANCER SCREENING: ICD-10-CM

## 2019-01-16 DIAGNOSIS — Z72.0 TOBACCO USE: ICD-10-CM

## 2019-01-16 DIAGNOSIS — R03.0 ELEVATED BP WITHOUT DIAGNOSIS OF HYPERTENSION: ICD-10-CM

## 2019-01-16 DIAGNOSIS — Z00.00 ANNUAL PHYSICAL EXAM: Primary | ICD-10-CM

## 2019-01-16 DIAGNOSIS — D22.9 SUSPICIOUS NEVUS: ICD-10-CM

## 2019-01-16 PROCEDURE — 99396 PREV VISIT EST AGE 40-64: CPT | Performed by: PHYSICIAN ASSISTANT

## 2019-01-16 NOTE — PROGRESS NOTES
Patient presents with:  Physical: cn room 6: patient is here for a physical today , patient declines flu shot       HPI:  Pt presents for annual physical.  She reports she remains under a great deal of stress with sister who has cancer and needs surgery. Musculoskeletal: Negative for myalgias, back pain, joint swelling, arthralgias and gait problem. Skin: Negative for rash. Has a mole on her neck that is getting darker and she is concerned about.    Neurological: Negative for dizziness, syncope, weakne Emulsion Place 1 drop into both eyes 2 (two) times daily. Disp:  Rfl:    Multiple Vitamin (ONE-DAILY MULTI VITAMINS) Oral Tab Take 1 tablet by mouth daily. Disp:  Rfl:    Cyanocobalamin (VITAMIN B 12 OR) Take by mouth.  Disp:  Rfl:    COD LIVER OIL OR Take side of neck with some darker area and some irregularity to posterior border. Psychiatric: Normal mood and affect.      [facility] hospital Lab Encounter on 12/19/2018   Component Date Value Ref Range Status   • Glucose 12/19/2018 92  70 - 99 mg/dL Final   • Sodium 12/19 12/19/2018 31.4  27.0 - 33.2 pg Final   • MCHC 12/19/2018 33.0  31.0 - 37.0 g/dL Final   • RDW 12/19/2018 13.0  11.5 - 16.0 % Final   • RDW-SD 12/19/2018 46.0  35.1 - 46.3 fL Final   • Neutrophil Absolute Prelim 12/19/2018 4.35  1.30 - 6.70 x10 (3) Michelle Arthur encounter       Imaging & Consults:  DERM - INTERNAL      Return in about 6 weeks (around 2/27/2019) for Blood pressure. There are no Patient Instructions on file for this visit. All questions were answered and the patient understands the plan.

## 2019-01-19 ENCOUNTER — LAB ENCOUNTER (OUTPATIENT)
Dept: LAB | Facility: HOSPITAL | Age: 64
End: 2019-01-19
Attending: PHYSICIAN ASSISTANT
Payer: COMMERCIAL

## 2019-01-19 DIAGNOSIS — Z12.11 COLON CANCER SCREENING: ICD-10-CM

## 2019-01-19 PROCEDURE — 82274 ASSAY TEST FOR BLOOD FECAL: CPT

## 2019-01-21 ENCOUNTER — PATIENT MESSAGE (OUTPATIENT)
Dept: SURGERY | Facility: CLINIC | Age: 64
End: 2019-01-21

## 2019-01-21 NOTE — TELEPHONE ENCOUNTER
Spoke with Toby Hernandez who understands the change in her mutation and has spoken to Chuck Kelly about her results. Explained she does not need additional diagnostic imaging at this time or before her appointment with Dr. Ely Steven. .  She has an appointment for

## 2019-02-04 NOTE — PROGRESS NOTES
FOBT negative X 3. Please notify pt. Will need to repeat next year or pursue other form of colon ca screening next year (ie colonoscopy).

## 2019-04-11 ENCOUNTER — OFFICE VISIT (OUTPATIENT)
Dept: INTERNAL MEDICINE CLINIC | Facility: CLINIC | Age: 64
End: 2019-04-11
Payer: COMMERCIAL

## 2019-04-11 VITALS
WEIGHT: 133 LBS | TEMPERATURE: 99 F | SYSTOLIC BLOOD PRESSURE: 142 MMHG | DIASTOLIC BLOOD PRESSURE: 68 MMHG | HEART RATE: 72 BPM | HEIGHT: 62 IN | BODY MASS INDEX: 24.48 KG/M2

## 2019-04-11 DIAGNOSIS — I10 ESSENTIAL HYPERTENSION: Primary | ICD-10-CM

## 2019-04-11 PROCEDURE — 99213 OFFICE O/P EST LOW 20 MIN: CPT | Performed by: PHYSICIAN ASSISTANT

## 2019-04-11 RX ORDER — IRBESARTAN 150 MG/1
150 TABLET ORAL DAILY
Qty: 90 TABLET | Refills: 0 | Status: SHIPPED | OUTPATIENT
Start: 2019-04-11 | End: 2019-06-04

## 2019-04-11 NOTE — PROGRESS NOTES
Patient presents with:  Blood Pressure: LG. Room 6. HPI:  Pt presents for follow up of BP. She denies any new problems today. Still under a great deal of stress with family's health issues. Tob use - Is considering quitting but not ready yet. PO daily. Check BMP in 2 weeks. F/U in 6 weeks for recheck. Call with questions or problems. Tob use - In contemplative stage. Advised/encouraged cessation. Pt agreed to discuss further at next OV.     Orders Placed This Encounter      Basic Metabolic

## 2019-04-26 ENCOUNTER — LAB ENCOUNTER (OUTPATIENT)
Dept: LAB | Age: 64
End: 2019-04-26
Attending: PHYSICIAN ASSISTANT
Payer: COMMERCIAL

## 2019-04-26 DIAGNOSIS — I10 ESSENTIAL HYPERTENSION: ICD-10-CM

## 2019-04-26 PROCEDURE — 80048 BASIC METABOLIC PNL TOTAL CA: CPT | Performed by: PHYSICIAN ASSISTANT

## 2019-04-26 PROCEDURE — 36415 COLL VENOUS BLD VENIPUNCTURE: CPT | Performed by: PHYSICIAN ASSISTANT

## 2019-05-23 ENCOUNTER — OFFICE VISIT (OUTPATIENT)
Dept: INTERNAL MEDICINE CLINIC | Facility: CLINIC | Age: 64
End: 2019-05-23
Payer: COMMERCIAL

## 2019-05-23 VITALS
BODY MASS INDEX: 24.29 KG/M2 | HEART RATE: 72 BPM | WEIGHT: 132 LBS | HEIGHT: 62 IN | TEMPERATURE: 99 F | DIASTOLIC BLOOD PRESSURE: 76 MMHG | RESPIRATION RATE: 16 BRPM | SYSTOLIC BLOOD PRESSURE: 122 MMHG

## 2019-05-23 DIAGNOSIS — I10 ESSENTIAL HYPERTENSION: Primary | ICD-10-CM

## 2019-05-23 DIAGNOSIS — Z72.0 TOBACCO USE: ICD-10-CM

## 2019-05-23 PROCEDURE — 99213 OFFICE O/P EST LOW 20 MIN: CPT | Performed by: PHYSICIAN ASSISTANT

## 2019-05-23 NOTE — PROGRESS NOTES
Patient presents with: Follow - Up: on blood pressure. LB-rm 2      HPI:  Pt presents for follow up of BP. She started Irbesartan at her last OV. She states it took her 3 weeks to \"get used\" to taking the medication.   She had a \"foggy\" feeling for 3 Breastfeeding? No   BMI 24.14 kg/m²   Constitutional:  No distress. HEENT:  Normocephalic and atraumatic. Neck: Normal range of motion. Neck supple. Cardiovascular: Normal rate, regular rhythm. No murmur, rubs or gallops.    Pulmonary/Chest: Effort n

## 2019-06-04 DIAGNOSIS — I10 ESSENTIAL HYPERTENSION: ICD-10-CM

## 2019-06-04 RX ORDER — IRBESARTAN 150 MG/1
TABLET ORAL
Qty: 90 TABLET | Refills: 0 | Status: SHIPPED | OUTPATIENT
Start: 2019-06-04 | End: 2019-08-19

## 2019-07-11 PROBLEM — Z15.09 BIALLELIC MUTATION OF CHEK2 GENE: Status: ACTIVE | Noted: 2019-07-11

## 2019-07-11 PROBLEM — Z15.89 BIALLELIC MUTATION OF CHEK2 GENE: Status: ACTIVE | Noted: 2019-07-11

## 2019-07-11 PROBLEM — Z15.01 BIALLELIC MUTATION OF CHEK2 GENE: Status: ACTIVE | Noted: 2019-07-11

## 2019-07-11 PROBLEM — H90.2 CONDUCTIVE HEARING LOSS: Status: ACTIVE | Noted: 2019-07-11

## 2019-07-11 NOTE — PROGRESS NOTES
Breast Surgery Surveillance Visit    Diagnosis: DCIS, left breast; ER negative, [place] negative status post Left lumpectomy on December 12, 2016    Stage:  Cancer Staging  Ductal carcinoma in situ (DCIS) of left breast  Staging form: Breast, [address] imaging. The additional imaging confirmed a cluster of new pleomorphic calcifications at the 2:00 position of the left breast anterior depth for which stereotactic guided biopsy was recommended and performed. This confirmed DCIS, Grade 3, ER/[place] negative.  Ceferino Guzman TABLET BY MOUTH  DAILY Disp: 90 tablet Rfl: 0   cycloSPORINE (RESTASIS) 0.05 % Ophthalmic Emulsion Place 1 drop into both eyes 2 (two) times daily. Disp:  Rfl:    Multiple Vitamin (ONE-DAILY MULTI VITAMINS) Oral Tab Take 1 tablet by mouth daily.  Disp:  Rfl near-fainting, difficulty breathing when lying flat, SOB/Coughing at night, swelling of the legs or chest pain while walking.     Breasts:  See history of present illness    Gastrointestinal:     There is no history of difficulty or pain with swallowing, re well-nourished, alert and oriented woman. There is not palpable cervical, supraclavicular or axillary adenopathy. The neck is supple with a midline trachea and no thyromegaly. Range of motion is good at both shoulders.  Breasts are symmetrical. The tumorec and explained that a referral to physical therapy may be warranted in the future if she identifies any limitations or restrictions. She was encouraged to contact the office with any questions or concerns prior to her next scheduled appointment.     This enc

## 2019-07-12 ENCOUNTER — OFFICE VISIT (OUTPATIENT)
Dept: SURGERY | Facility: CLINIC | Age: 64
End: 2019-07-12
Payer: COMMERCIAL

## 2019-07-12 VITALS
RESPIRATION RATE: 20 BRPM | HEART RATE: 66 BPM | HEIGHT: 62 IN | SYSTOLIC BLOOD PRESSURE: 143 MMHG | OXYGEN SATURATION: 100 % | TEMPERATURE: 97 F | BODY MASS INDEX: 24.11 KG/M2 | WEIGHT: 131 LBS | DIASTOLIC BLOOD PRESSURE: 78 MMHG

## 2019-07-12 DIAGNOSIS — Z80.3 FAMILY HISTORY OF BREAST CANCER: ICD-10-CM

## 2019-07-12 DIAGNOSIS — Z15.09 BIALLELIC MUTATION OF CHEK2 GENE: Primary | ICD-10-CM

## 2019-07-12 DIAGNOSIS — Z15.01 BIALLELIC MUTATION OF CHEK2 GENE: Primary | ICD-10-CM

## 2019-07-12 DIAGNOSIS — Z91.89 AT HIGH RISK FOR BREAST CANCER: ICD-10-CM

## 2019-07-12 DIAGNOSIS — D05.12 BREAST NEOPLASM, TIS (DCIS), LEFT: ICD-10-CM

## 2019-07-12 PROCEDURE — 99214 OFFICE O/P EST MOD 30 MIN: CPT | Performed by: SURGERY

## 2019-07-12 RX ORDER — TIMOLOL MALEATE 5 MG/ML
SOLUTION/ DROPS OPHTHALMIC
COMMUNITY
Start: 2019-07-01

## 2019-07-12 RX ORDER — TRAVOPROST 0.004 %
DROPS OPHTHALMIC (EYE)
COMMUNITY
Start: 2019-06-28

## 2019-08-01 ENCOUNTER — HOSPITAL ENCOUNTER (OUTPATIENT)
Dept: MRI IMAGING | Facility: HOSPITAL | Age: 64
Discharge: HOME | End: 2019-08-01
Attending: SURGERY
Payer: COMMERCIAL

## 2019-08-01 DIAGNOSIS — Z91.89 AT HIGH RISK FOR BREAST CANCER: ICD-10-CM

## 2019-08-01 DIAGNOSIS — Z80.3 FAMILY HISTORY OF BREAST CANCER: ICD-10-CM

## 2019-08-01 DIAGNOSIS — D05.12 BREAST NEOPLASM, TIS (DCIS), LEFT: ICD-10-CM

## 2019-08-01 PROCEDURE — A9575 INJ GADOTERATE MEGLUMI 0.1ML: HCPCS | Performed by: SURGERY

## 2019-08-01 PROCEDURE — 77049 MRI BREAST C-+ W/CAD BI: CPT | Performed by: SURGERY

## 2019-08-02 DIAGNOSIS — D05.12 BREAST NEOPLASM, TIS (DCIS), LEFT: Primary | ICD-10-CM

## 2019-08-19 DIAGNOSIS — I10 ESSENTIAL HYPERTENSION: ICD-10-CM

## 2019-08-19 RX ORDER — IRBESARTAN 150 MG/1
TABLET ORAL
Qty: 90 TABLET | Refills: 0 | Status: SHIPPED | OUTPATIENT
Start: 2019-08-19 | End: 2019-11-04

## 2019-09-11 ENCOUNTER — HOSPITAL ENCOUNTER (OUTPATIENT)
Dept: RADIATION ONCOLOGY | Facility: HOSPITAL | Age: 64
Discharge: HOME | End: 2019-09-11
Attending: RADIOLOGY
Payer: COMMERCIAL

## 2019-09-11 VITALS
BODY MASS INDEX: 24 KG/M2 | DIASTOLIC BLOOD PRESSURE: 75 MMHG | WEIGHT: 129.38 LBS | RESPIRATION RATE: 18 BRPM | HEART RATE: 75 BPM | SYSTOLIC BLOOD PRESSURE: 135 MMHG | OXYGEN SATURATION: 94 % | TEMPERATURE: 98 F

## 2019-09-11 DIAGNOSIS — D05.12 DUCTAL CARCINOMA IN SITU (DCIS) OF LEFT BREAST: Primary | ICD-10-CM

## 2019-09-11 PROCEDURE — 99213 OFFICE O/P EST LOW 20 MIN: CPT

## 2019-09-11 NOTE — PATIENT INSTRUCTIONS
- CALL [phone] FOR A FOLLOW-UP WITH DR. Dixie Palma on an as needed basis    - CALL IF YOU HAVE ANY QUESTIONS/CONCERNS REGARDING RADIATION THERAPY: 21

## 2019-09-11 NOTE — PROGRESS NOTES
Nursing Follow-Up Note    Patient: Trinity Naylor  YOB: 1955  Age: 66year old  Radiation Oncologist: Dr. Tl Albright  Referring Physician: No ref. provider found  Chief Complaint: Patient presents with:   Follow - Up    Date: 9/11/20

## 2019-09-11 NOTE — PROGRESS NOTES
[facility]    PATIENT'S NAME: Juan Beaver   RADIATION ONCOLOGIST: Suze Raya. Navin Kumar M.D.    PATIENT ACCOUNT #: [de-identified] [facility]   MEDICAL RECORD #: [id number] YOB: 1955   FOLLOW-UP DATE: 09/11/2019       RADIATI VITAL SIGNS:  On exam today, the patient is noted to have blood pressure of 135/75, pulse of 75, respiratory rate of 18, and temperature of 97.9. She has a pain score of 0 and a weight of 129 pounds. NECK:  Supple with no lymphadenopathy.   LUNGS:  Eulalia

## 2019-09-24 ENCOUNTER — OFFICE VISIT (OUTPATIENT)
Dept: FAMILY MEDICINE CLINIC | Facility: CLINIC | Age: 64
End: 2019-09-24
Payer: COMMERCIAL

## 2019-09-24 VITALS
OXYGEN SATURATION: 98 % | WEIGHT: 130 LBS | DIASTOLIC BLOOD PRESSURE: 78 MMHG | HEART RATE: 68 BPM | RESPIRATION RATE: 16 BRPM | BODY MASS INDEX: 23.92 KG/M2 | TEMPERATURE: 99 F | SYSTOLIC BLOOD PRESSURE: 132 MMHG | HEIGHT: 62 IN

## 2019-09-24 DIAGNOSIS — M25.561 ACUTE PAIN OF RIGHT KNEE: Primary | ICD-10-CM

## 2019-09-24 PROCEDURE — 99213 OFFICE O/P EST LOW 20 MIN: CPT | Performed by: NURSE PRACTITIONER

## 2019-09-24 NOTE — PROGRESS NOTES
CHIEF COMPLAINT:     Patient presents with:  Knee Pain      HPI:   Nichelle Ayala is a 66year old female who presents today with a chief complaint of  right knee pain. Has had for 2 weeks.   Reports no pain at rest; pain occurs with weight bearing/walk GENERAL: Feels well otherwise. No fevers  SKIN: Denies rashes, skin wounds or ulcers. LUNGS: denies shortness of breath   CARDIOVASCULAR: denies chest pain or palpitations  MUSCULOSKELETAL: Per HPI. Denies other joint pain.    NEURO: denies numbness, ti

## 2019-10-05 ENCOUNTER — APPOINTMENT (OUTPATIENT)
Dept: GENERAL RADIOLOGY | Age: 64
End: 2019-10-05
Attending: FAMILY MEDICINE
Payer: COMMERCIAL

## 2019-10-05 ENCOUNTER — HOSPITAL ENCOUNTER (OUTPATIENT)
Age: 64
Discharge: HOME | End: 2019-10-05
Attending: FAMILY MEDICINE
Payer: COMMERCIAL

## 2019-10-05 ENCOUNTER — APPOINTMENT (OUTPATIENT)
Dept: ULTRASOUND IMAGING | Age: 64
End: 2019-10-05
Attending: FAMILY MEDICINE
Payer: COMMERCIAL

## 2019-10-05 VITALS
BODY MASS INDEX: 23.92 KG/M2 | HEART RATE: 72 BPM | OXYGEN SATURATION: 100 % | DIASTOLIC BLOOD PRESSURE: 58 MMHG | WEIGHT: 130 LBS | RESPIRATION RATE: 20 BRPM | HEIGHT: 62 IN | SYSTOLIC BLOOD PRESSURE: 144 MMHG | TEMPERATURE: 99 F

## 2019-10-05 DIAGNOSIS — M25.561 ACUTE PAIN OF RIGHT KNEE: Primary | ICD-10-CM

## 2019-10-05 PROCEDURE — 99204 OFFICE O/P NEW MOD 45 MIN: CPT

## 2019-10-05 PROCEDURE — 99214 OFFICE O/P EST MOD 30 MIN: CPT

## 2019-10-05 PROCEDURE — 73560 X-RAY EXAM OF KNEE 1 OR 2: CPT | Performed by: FAMILY MEDICINE

## 2019-10-05 PROCEDURE — 93971 EXTREMITY STUDY: CPT | Performed by: FAMILY MEDICINE

## 2019-10-05 RX ORDER — NAPROXEN 500 MG/1
500 TABLET ORAL 2 TIMES DAILY PRN
Qty: 20 TABLET | Refills: 0 | Status: SHIPPED | OUTPATIENT
Start: 2019-10-05 | End: 2019-10-12

## 2019-10-05 RX ORDER — IBUPROFEN 200 MG
200 TABLET ORAL EVERY 6 HOURS PRN
COMMUNITY

## 2019-10-05 NOTE — ED INITIAL ASSESSMENT (HPI)
Pt. Reports Rt. Knee pain & swelling for about 3 weeks. Did go to the [facility], but told they could not do much. Pt. Does see a chiro. Does wear orthotics in her shoes.   No injury

## 2019-10-05 NOTE — ED PROVIDER NOTES
Patient Seen in: THE [facility] [place]      History   Patient presents with:  Knee Pain: Rt.    Stated Complaint: RIGHT KNEE PAIN X 3 WKS    HPI    74-year-old female presents with complaints of right knee pain for the past 3 weeks.   She d systems are as noted in HPI. Constitutional and vital signs reviewed. All other systems reviewed and negative except as noted above.     Physical Exam     ED Triage Vitals [10/05/19 1235]   /58   Pulse 72   Resp 20   Temp 98.7 °F (37.1 °C)   Tem DOPPLER LEG RIGHT - DIAG IMG (CPT=93971)  COMPARISON:  None. INDICATIONS:  posterior knee pain, leg pain for 3 weeks  TECHNIQUE:  Real time, grey scale, and duplex ultrasound was used to evaluate the lower extremity venous system.  B-mode two-dimensional i

## 2019-11-04 DIAGNOSIS — I10 ESSENTIAL HYPERTENSION: ICD-10-CM

## 2019-11-04 RX ORDER — IRBESARTAN 150 MG/1
TABLET ORAL
Qty: 90 TABLET | Refills: 0 | Status: SHIPPED | OUTPATIENT
Start: 2019-11-04 | End: 2020-02-12

## 2019-12-09 ENCOUNTER — HOSPITAL ENCOUNTER (OUTPATIENT)
Dept: MAMMOGRAPHY | Facility: HOSPITAL | Age: 64
Discharge: HOME | End: 2019-12-09
Attending: SURGERY
Payer: COMMERCIAL

## 2019-12-09 DIAGNOSIS — D05.12 BREAST NEOPLASM, TIS (DCIS), LEFT: ICD-10-CM

## 2019-12-09 PROCEDURE — 77066 DX MAMMO INCL CAD BI: CPT | Performed by: SURGERY

## 2019-12-09 PROCEDURE — 77062 BREAST TOMOSYNTHESIS BI: CPT | Performed by: SURGERY

## 2019-12-09 PROCEDURE — 76642 ULTRASOUND BREAST LIMITED: CPT | Performed by: SURGERY

## 2020-01-08 ENCOUNTER — TELEPHONE (OUTPATIENT)
Dept: INTERNAL MEDICINE CLINIC | Facility: CLINIC | Age: 65
End: 2020-01-08

## 2020-01-08 DIAGNOSIS — Z13.0 SCREENING FOR DISORDER OF BLOOD AND BLOOD-FORMING ORGANS: ICD-10-CM

## 2020-01-08 DIAGNOSIS — Z13.29 SCREENING FOR THYROID DISORDER: ICD-10-CM

## 2020-01-08 DIAGNOSIS — Z00.00 ROUTINE GENERAL MEDICAL EXAMINATION AT A HEALTH CARE FACILITY: Primary | ICD-10-CM

## 2020-01-08 DIAGNOSIS — Z13.220 SCREENING FOR LIPID DISORDERS: ICD-10-CM

## 2020-01-08 DIAGNOSIS — Z13.228 SCREENING FOR METABOLIC DISORDER: ICD-10-CM

## 2020-01-08 NOTE — TELEPHONE ENCOUNTER
Please place fasting lab orders through Willow Romero for upcoming appointment    Future Appointments   Date Time Provider Jennifer Arredondo   1/22/2020 10:45 AM Pearl Kim PA-C [facility]   1/31/2020  9:30 AM Corky Gonzalez MD [facility]

## 2020-01-17 ENCOUNTER — LAB ENCOUNTER (OUTPATIENT)
Dept: LAB | Age: 65
End: 2020-01-17
Attending: PHYSICIAN ASSISTANT
Payer: COMMERCIAL

## 2020-01-17 DIAGNOSIS — Z13.29 SCREENING FOR THYROID DISORDER: ICD-10-CM

## 2020-01-17 DIAGNOSIS — Z13.0 SCREENING FOR DISORDER OF BLOOD AND BLOOD-FORMING ORGANS: ICD-10-CM

## 2020-01-17 DIAGNOSIS — Z13.220 SCREENING FOR LIPID DISORDERS: ICD-10-CM

## 2020-01-17 DIAGNOSIS — Z13.228 SCREENING FOR METABOLIC DISORDER: ICD-10-CM

## 2020-01-17 DIAGNOSIS — Z00.00 ROUTINE GENERAL MEDICAL EXAMINATION AT A HEALTH CARE FACILITY: ICD-10-CM

## 2020-01-17 LAB
ALBUMIN SERPL-MCNC: 3.5 G/DL (ref 3.4–5)
ALBUMIN/GLOB SERPL: 0.9 {RATIO} (ref 1–2)
ALP LIVER SERPL-CCNC: 86 U/L (ref 50–130)
ALT SERPL-CCNC: 17 U/L (ref 13–56)
ANION GAP SERPL CALC-SCNC: 2 MMOL/L (ref 0–18)
AST SERPL-CCNC: 6 U/L (ref 15–37)
BASOPHILS # BLD AUTO: 0.09 X10(3) UL (ref 0–0.2)
BASOPHILS NFR BLD AUTO: 1.1 %
BILIRUB SERPL-MCNC: 0.7 MG/DL (ref 0.1–2)
BUN BLD-MCNC: 9 MG/DL (ref 7–18)
BUN/CREAT SERPL: 14.3 (ref 10–20)
CALCIUM BLD-MCNC: 9.2 MG/DL (ref 8.5–10.1)
CHLORIDE SERPL-SCNC: 108 MMOL/L (ref 98–112)
CHOLEST SMN-MCNC: 182 MG/DL (ref ?–200)
CO2 SERPL-SCNC: 28 MMOL/L (ref 21–32)
CREAT BLD-MCNC: 0.63 MG/DL (ref 0.55–1.02)
DEPRECATED RDW RBC AUTO: 43.9 FL (ref 35.1–46.3)
EOSINOPHIL # BLD AUTO: 0.25 X10(3) UL (ref 0–0.7)
EOSINOPHIL NFR BLD AUTO: 3.2 %
ERYTHROCYTE [DISTWIDTH] IN BLOOD BY AUTOMATED COUNT: 12.8 % (ref 11–15)
GLOBULIN PLAS-MCNC: 3.8 G/DL (ref 2.8–4.4)
GLUCOSE BLD-MCNC: 95 MG/DL (ref 70–99)
HCT VFR BLD AUTO: 42.8 % (ref 35–48)
HDLC SERPL-MCNC: 59 MG/DL (ref 40–59)
HGB BLD-MCNC: 14.3 G/DL (ref 12–16)
IMM GRANULOCYTES # BLD AUTO: 0.02 X10(3) UL (ref 0–1)
IMM GRANULOCYTES NFR BLD: 0.3 %
LDLC SERPL CALC-MCNC: 108 MG/DL (ref ?–100)
LYMPHOCYTES # BLD AUTO: 2.07 X10(3) UL (ref 1–4)
LYMPHOCYTES NFR BLD AUTO: 26.4 %
M PROTEIN MFR SERPL ELPH: 7.3 G/DL (ref 6.4–8.2)
MCH RBC QN AUTO: 31.5 PG (ref 26–34)
MCHC RBC AUTO-ENTMCNC: 33.4 G/DL (ref 31–37)
MCV RBC AUTO: 94.3 FL (ref 80–100)
MONOCYTES # BLD AUTO: 0.61 X10(3) UL (ref 0.1–1)
MONOCYTES NFR BLD AUTO: 7.8 %
NEUTROPHILS # BLD AUTO: 4.81 X10 (3) UL (ref 1.5–7.7)
NEUTROPHILS # BLD AUTO: 4.81 X10(3) UL (ref 1.5–7.7)
NEUTROPHILS NFR BLD AUTO: 61.2 %
NONHDLC SERPL-MCNC: 123 MG/DL (ref ?–130)
OSMOLALITY SERPL CALC.SUM OF ELEC: 284 MOSM/KG (ref 275–295)
PATIENT FASTING Y/N/NP: YES
PATIENT FASTING Y/N/NP: YES
PLATELET # BLD AUTO: 388 10(3)UL (ref 150–450)
POTASSIUM SERPL-SCNC: 4.6 MMOL/L (ref 3.5–5.1)
RBC # BLD AUTO: 4.54 X10(6)UL (ref 3.8–5.3)
SODIUM SERPL-SCNC: 138 MMOL/L (ref 136–145)
TRIGL SERPL-MCNC: 77 MG/DL (ref 30–149)
TSI SER-ACNC: 0.67 MIU/ML (ref 0.36–3.74)
VLDLC SERPL CALC-MCNC: 15 MG/DL (ref 0–30)
WBC # BLD AUTO: 7.9 X10(3) UL (ref 4–11)

## 2020-01-17 PROCEDURE — 36415 COLL VENOUS BLD VENIPUNCTURE: CPT | Performed by: PHYSICIAN ASSISTANT

## 2020-01-17 PROCEDURE — 80050 GENERAL HEALTH PANEL: CPT | Performed by: PHYSICIAN ASSISTANT

## 2020-01-17 PROCEDURE — 80061 LIPID PANEL: CPT | Performed by: PHYSICIAN ASSISTANT

## 2020-01-22 ENCOUNTER — OFFICE VISIT (OUTPATIENT)
Dept: INTERNAL MEDICINE CLINIC | Facility: CLINIC | Age: 65
End: 2020-01-22
Payer: COMMERCIAL

## 2020-01-22 VITALS
TEMPERATURE: 99 F | DIASTOLIC BLOOD PRESSURE: 64 MMHG | RESPIRATION RATE: 18 BRPM | BODY MASS INDEX: 22.75 KG/M2 | HEART RATE: 70 BPM | WEIGHT: 130 LBS | SYSTOLIC BLOOD PRESSURE: 128 MMHG | OXYGEN SATURATION: 99 % | HEIGHT: 63.39 IN

## 2020-01-22 DIAGNOSIS — Z15.01 BIALLELIC MUTATION OF CHEK2 GENE: ICD-10-CM

## 2020-01-22 DIAGNOSIS — Z23 NEED FOR PNEUMOCOCCAL VACCINATION: ICD-10-CM

## 2020-01-22 DIAGNOSIS — R22.0 FACIAL MASS: ICD-10-CM

## 2020-01-22 DIAGNOSIS — R09.81 NASAL CONGESTION: ICD-10-CM

## 2020-01-22 DIAGNOSIS — F17.200 SMOKER: ICD-10-CM

## 2020-01-22 DIAGNOSIS — Z12.11 COLON CANCER SCREENING: ICD-10-CM

## 2020-01-22 DIAGNOSIS — Z00.00 ANNUAL PHYSICAL EXAM: Primary | ICD-10-CM

## 2020-01-22 DIAGNOSIS — M85.859 OSTEOPENIA OF NECK OF FEMUR, UNSPECIFIED LATERALITY: ICD-10-CM

## 2020-01-22 DIAGNOSIS — Z15.09 BIALLELIC MUTATION OF CHEK2 GENE: ICD-10-CM

## 2020-01-22 PROCEDURE — 90732 PPSV23 VACC 2 YRS+ SUBQ/IM: CPT | Performed by: PHYSICIAN ASSISTANT

## 2020-01-22 PROCEDURE — 90471 IMMUNIZATION ADMIN: CPT | Performed by: PHYSICIAN ASSISTANT

## 2020-01-22 PROCEDURE — 99396 PREV VISIT EST AGE 40-64: CPT | Performed by: PHYSICIAN ASSISTANT

## 2020-01-22 PROCEDURE — 99212 OFFICE O/P EST SF 10 MIN: CPT | Performed by: PHYSICIAN ASSISTANT

## 2020-01-22 RX ORDER — FLUTICASONE PROPIONATE 50 MCG
2 SPRAY, SUSPENSION (ML) NASAL DAILY
Qty: 1 BOTTLE | Refills: 0 | Status: SHIPPED | OUTPATIENT
Start: 2020-01-22 | End: 2021-01-27

## 2020-01-22 NOTE — PROGRESS NOTES
Patient presents with:  Physical: RG rm 2 Physical and refill meds  Cough      HPI:  Pt presents for annual physical.  Last pap was 2016 was normal, done by Dr. Romana Fore, no record in [address] [place] [address] Rd of it. Has never had an abnormal pap. Did yearly paps for many years. 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 • Multiple Vitamin (ONE-DAILY MULTI VITAMINS) Oral Tab Take 1 tablet by mouth daily. • Cyanocobalamin (VITAMIN B 12 OR) Take by mouth.      • COD LIVER OIL OR Evelena Arnav rash noted. No erythema. No pallor. + pea sized flesh colored papule noted to L cheek. Psychiatric: Normal mood and affect.      [facility] Lab Encounter on 01/17/2020   Component Date Value Ref Range Status   • Glucose 01/17/2020 95  70 - 99 mg/dL Final   • Sodi 450.0 10(3)uL Final   • MCV 01/17/2020 94.3  80.0 - 100.0 fL Final   • MCH 01/17/2020 31.5  26.0 - 34.0 pg Final   • MCHC 01/17/2020 33.4  31.0 - 37.0 g/dL Final   • RDW 01/17/2020 12.8  11.0 - 15.0 % Final   • RDW-SD 01/17/2020 43.9  35.1 - 46.3 fL Final Signed Prescriptions Disp Refills   • Fluticasone Propionate 50 MCG/ACT Nasal Suspension 1 Bottle 0     Si sprays by Nasal route daily.        Imaging & Consults:  PNEUMOCOCCAL IMM (PNEUMOVAX)  DERM - INTERNAL  XR DEXA BONE DENSITOMETRY (CPT=77080)

## 2020-01-31 ENCOUNTER — OFFICE VISIT (OUTPATIENT)
Dept: SURGERY | Facility: CLINIC | Age: 65
End: 2020-01-31
Payer: COMMERCIAL

## 2020-01-31 VITALS
WEIGHT: 130 LBS | RESPIRATION RATE: 16 BRPM | BODY MASS INDEX: 22.75 KG/M2 | OXYGEN SATURATION: 100 % | SYSTOLIC BLOOD PRESSURE: 123 MMHG | HEIGHT: 63.39 IN | HEART RATE: 69 BPM | DIASTOLIC BLOOD PRESSURE: 77 MMHG

## 2020-01-31 DIAGNOSIS — Z15.09 CHEK2-RELATED BREAST CANCER (HCC): Primary | ICD-10-CM

## 2020-01-31 DIAGNOSIS — C50.919 CHEK2-RELATED BREAST CANCER (HCC): Primary | ICD-10-CM

## 2020-01-31 DIAGNOSIS — Z12.31 ENCOUNTER FOR SCREENING MAMMOGRAM FOR HIGH-RISK PATIENT: ICD-10-CM

## 2020-01-31 DIAGNOSIS — Z91.89 AT HIGH RISK FOR BREAST CANCER: ICD-10-CM

## 2020-01-31 DIAGNOSIS — Z15.02 CHEK2-RELATED BREAST CANCER (HCC): Primary | ICD-10-CM

## 2020-01-31 DIAGNOSIS — Z15.89 CHEK2-RELATED BREAST CANCER (HCC): Primary | ICD-10-CM

## 2020-01-31 PROCEDURE — 99214 OFFICE O/P EST MOD 30 MIN: CPT | Performed by: SURGERY

## 2020-01-31 NOTE — PROGRESS NOTES
Breast Surgery Surveillance Visit    Diagnosis: DCIS, left breast; ER negative, [place] negative status post Left lumpectomy on December 12, 2016    Stage:  Cancer Staging  Ductal carcinoma in situ (DCIS) of left breast  Staging form: Breast, [address] imaging. The additional imaging confirmed a cluster of new pleomorphic calcifications at the 2:00 position of the left breast anterior depth for which stereotactic guided biopsy was recommended and performed. This confirmed DCIS, Grade 3, ER/MS negative.  Tara Lynch MG Oral Tab, TAKE 1 TABLET BY MOUTH  DAILY, Disp: 90 tablet, Rfl: 0  ibuprofen (ADVIL) 200 MG Oral Tab, Take 200 mg by mouth every 6 (six) hours as needed for Pain., Disp: , Rfl:   Timolol Maleate 0.5 % Ophthalmic Solution, , Disp: , Rfl:   TRAVATAN Z 0.00 Cardiovascular: There is no history of chest pain, chest pressure/discomfort, palpitations, irregular heartbeat, fainting or near-fainting, difficulty breathing when lying flat, SOB/Coughing at night, swelling of the legs or chest pain while walking. 3. 39\")   Wt 59 kg (130 lb)   SpO2 100%   BMI 22.75 kg/m²     Physical Examination: This is a well-nourished, alert and oriented woman. There is not palpable cervical, supraclavicular or axillary adenopathy.   The neck is supple with a midline trachea and with her next mammographic screening in December 2020. We will plan to stagger this with her annual mammogram in 6-month intervals.  She will return to see me in 12 months for clinical exam. I encouraged her to continue monitoring her ROM and strength and

## 2020-02-06 ENCOUNTER — HOSPITAL ENCOUNTER (OUTPATIENT)
Dept: BONE DENSITY | Age: 65
Discharge: HOME | End: 2020-02-06
Attending: PHYSICIAN ASSISTANT
Payer: COMMERCIAL

## 2020-02-06 DIAGNOSIS — M85.859 OSTEOPENIA OF NECK OF FEMUR, UNSPECIFIED LATERALITY: ICD-10-CM

## 2020-02-06 PROCEDURE — 77080 DXA BONE DENSITY AXIAL: CPT | Performed by: PHYSICIAN ASSISTANT

## 2020-02-07 NOTE — PROGRESS NOTES
Bone density declining some but still falling in osteopenic range with FRAX scores that do not warrant addition of bisphosphonate at this time.   Would encourage regular wt bearing exercise and adequate calcium/Vit D (4548-4088 mg daily and 2000 int units d

## 2020-02-12 DIAGNOSIS — I10 ESSENTIAL HYPERTENSION: ICD-10-CM

## 2020-02-12 RX ORDER — IRBESARTAN 150 MG/1
TABLET ORAL
Qty: 90 TABLET | Refills: 0 | Status: SHIPPED | OUTPATIENT
Start: 2020-02-12 | End: 2020-05-04

## 2020-02-12 NOTE — TELEPHONE ENCOUNTER
Last VISIT 1/22/20 CB    Last REFILL 11/4/19 Irbesartan 90T 0R    Last LABS 1/17/20 CBC, CMP, Lipid, TSH    No future appointments. Per PROTOCOL? HTN protocol passed    Please Approve or Deny.

## 2020-03-06 ENCOUNTER — LAB ENCOUNTER (OUTPATIENT)
Dept: LAB | Facility: HOSPITAL | Age: 65
End: 2020-03-06
Attending: PHYSICIAN ASSISTANT
Payer: COMMERCIAL

## 2020-03-06 DIAGNOSIS — Z12.11 COLON CANCER SCREENING: ICD-10-CM

## 2020-03-06 PROCEDURE — 82274 ASSAY TEST FOR BLOOD FECAL: CPT

## 2020-03-19 DIAGNOSIS — R19.5 POSITIVE FECAL OCCULT BLOOD TEST: Primary | ICD-10-CM

## 2020-03-19 NOTE — PROGRESS NOTES
Two of the three FOBT are positive. Pt will need to have a colonoscopy and EGD. She [facility] to schedule.   I have placed a referral.

## 2020-03-20 ENCOUNTER — TELEPHONE (OUTPATIENT)
Dept: INTERNAL MEDICINE CLINIC | Facility: CLINIC | Age: 65
End: 2020-03-20

## 2020-03-20 NOTE — TELEPHONE ENCOUNTER
She will need to schedule colonoscopy when able, when GI starts scheduling again. I sent a reminder to myself for us to check with pt in early May about if she has been able to schedule.

## 2020-03-20 NOTE — TELEPHONE ENCOUNTER
LM for pt at [phone] (M)vmMAYUR would like to her keep checking with GI to see if she can schedule and we will recheck with her in early May to see if was able to schedule.

## 2020-03-20 NOTE — TELEPHONE ENCOUNTER
Pt calling back to say she was unable to get an appointment for colonoscopy, GI office not scheduling at all this time.       Pt's labs came back to indicate she needs a colonoscopy

## 2020-05-04 DIAGNOSIS — I10 ESSENTIAL HYPERTENSION: ICD-10-CM

## 2020-05-04 RX ORDER — IRBESARTAN 150 MG/1
TABLET ORAL
Qty: 90 TABLET | Refills: 1 | Status: SHIPPED | OUTPATIENT
Start: 2020-05-04 | End: 2020-11-16

## 2020-07-11 ENCOUNTER — LAB ENCOUNTER (OUTPATIENT)
Dept: LAB | Facility: HOSPITAL | Age: 65
End: 2020-07-11
Attending: STUDENT IN AN ORGANIZED HEALTH CARE EDUCATION/TRAINING PROGRAM
Payer: COMMERCIAL

## 2020-07-11 DIAGNOSIS — Z01.818 PRE-OP TESTING: ICD-10-CM

## 2020-07-11 LAB — SARS-COV-2 RNA RESP QL NAA+PROBE: NOT DETECTED

## 2020-07-14 PROBLEM — K64.4 HEMORRHOIDS, EXTERNAL WITHOUT COMPLICATIONS: Status: ACTIVE | Noted: 2020-07-14

## 2020-07-14 PROBLEM — R19.5 ABNORMAL FECES: Status: ACTIVE | Noted: 2020-07-14

## 2020-07-14 PROBLEM — K57.30 DIVERTICULOSIS OF LARGE INTESTINE WITHOUT PERFORATION OR ABSCESS WITHOUT BLEEDING: Status: ACTIVE | Noted: 2020-07-14

## 2020-07-14 PROBLEM — K63.5 COLON POLYP: Status: ACTIVE | Noted: 2020-07-14

## 2020-07-14 PROBLEM — K64.8 INTERNAL HEMORRHOIDS: Status: ACTIVE | Noted: 2020-07-14

## 2020-08-03 ENCOUNTER — HOSPITAL ENCOUNTER (OUTPATIENT)
Dept: MRI IMAGING | Facility: HOSPITAL | Age: 65
Discharge: HOME | End: 2020-08-03
Attending: SURGERY
Payer: COMMERCIAL

## 2020-08-03 DIAGNOSIS — C50.919 CHEK2-RELATED BREAST CANCER (HCC): ICD-10-CM

## 2020-08-03 DIAGNOSIS — Z15.09 CHEK2-RELATED BREAST CANCER (HCC): ICD-10-CM

## 2020-08-03 DIAGNOSIS — Z15.89 CHEK2-RELATED BREAST CANCER (HCC): ICD-10-CM

## 2020-08-03 DIAGNOSIS — Z91.89 AT HIGH RISK FOR BREAST CANCER: ICD-10-CM

## 2020-08-03 DIAGNOSIS — Z15.02 CHEK2-RELATED BREAST CANCER (HCC): ICD-10-CM

## 2020-08-03 PROCEDURE — A9575 INJ GADOTERATE MEGLUMI 0.1ML: HCPCS | Performed by: SURGERY

## 2020-08-03 PROCEDURE — 77049 MRI BREAST C-+ W/CAD BI: CPT | Performed by: SURGERY

## 2020-11-16 DIAGNOSIS — I10 ESSENTIAL HYPERTENSION: ICD-10-CM

## 2020-11-16 RX ORDER — IRBESARTAN 150 MG/1
TABLET ORAL
Qty: 90 TABLET | Refills: 0 | Status: SHIPPED | OUTPATIENT
Start: 2020-11-16 | End: 2021-03-18

## 2020-11-16 NOTE — TELEPHONE ENCOUNTER
Last VISIT 01/22/20    Last REFILL 05/04/20 qty 90 w/1 refill    Last LABS 07/11/20 Covid testing done     No Future Appointments        Per PROTOCOL? Failed    Please Approve or Deny.

## 2020-12-10 ENCOUNTER — HOSPITAL ENCOUNTER (OUTPATIENT)
Dept: MAMMOGRAPHY | Facility: HOSPITAL | Age: 65
Discharge: HOME | End: 2020-12-10
Attending: SURGERY
Payer: COMMERCIAL

## 2020-12-10 DIAGNOSIS — Z12.31 ENCOUNTER FOR SCREENING MAMMOGRAM FOR HIGH-RISK PATIENT: ICD-10-CM

## 2020-12-10 PROCEDURE — 77063 BREAST TOMOSYNTHESIS BI: CPT | Performed by: SURGERY

## 2020-12-10 PROCEDURE — 77067 SCR MAMMO BI INCL CAD: CPT | Performed by: SURGERY

## 2021-01-15 ENCOUNTER — TELEPHONE (OUTPATIENT)
Dept: INTERNAL MEDICINE CLINIC | Facility: CLINIC | Age: 66
End: 2021-01-15

## 2021-01-15 DIAGNOSIS — Z13.0 SCREENING FOR DISORDER OF BLOOD AND BLOOD-FORMING ORGANS: ICD-10-CM

## 2021-01-15 DIAGNOSIS — Z13.29 SCREENING FOR THYROID DISORDER: ICD-10-CM

## 2021-01-15 DIAGNOSIS — Z00.00 ROUTINE GENERAL MEDICAL EXAMINATION AT A HEALTH CARE FACILITY: Primary | ICD-10-CM

## 2021-01-15 DIAGNOSIS — Z13.228 SCREENING FOR METABOLIC DISORDER: ICD-10-CM

## 2021-01-15 DIAGNOSIS — Z13.220 SCREENING FOR LIPID DISORDERS: ICD-10-CM

## 2021-01-15 NOTE — TELEPHONE ENCOUNTER
CPE   Future Appointments   Date Time Provider Jennifer Hermani   1/27/2021 10:15 AM Charly Gonsales PA-C [facility]     Orders to  Jaclyn Jacobson  aware must fast no call back required

## 2021-01-21 ENCOUNTER — LAB ENCOUNTER (OUTPATIENT)
Dept: LAB | Age: 66
End: 2021-01-21
Attending: PHYSICIAN ASSISTANT
Payer: COMMERCIAL

## 2021-01-21 DIAGNOSIS — Z13.220 SCREENING FOR LIPID DISORDERS: ICD-10-CM

## 2021-01-21 DIAGNOSIS — Z00.00 ROUTINE GENERAL MEDICAL EXAMINATION AT A HEALTH CARE FACILITY: ICD-10-CM

## 2021-01-21 DIAGNOSIS — Z13.29 SCREENING FOR THYROID DISORDER: ICD-10-CM

## 2021-01-21 DIAGNOSIS — Z13.228 SCREENING FOR METABOLIC DISORDER: ICD-10-CM

## 2021-01-21 DIAGNOSIS — Z13.0 SCREENING FOR DISORDER OF BLOOD AND BLOOD-FORMING ORGANS: ICD-10-CM

## 2021-01-21 LAB
ALBUMIN SERPL-MCNC: 3.5 G/DL (ref 3.4–5)
ALBUMIN/GLOB SERPL: 1 {RATIO} (ref 1–2)
ALP LIVER SERPL-CCNC: 96 U/L
ALT SERPL-CCNC: 18 U/L
ANION GAP SERPL CALC-SCNC: 1 MMOL/L (ref 0–18)
AST SERPL-CCNC: 7 U/L (ref 15–37)
BASOPHILS # BLD AUTO: 0.08 X10(3) UL (ref 0–0.2)
BASOPHILS NFR BLD AUTO: 1.1 %
BILIRUB SERPL-MCNC: 0.8 MG/DL (ref 0.1–2)
BUN BLD-MCNC: 12 MG/DL (ref 7–18)
BUN/CREAT SERPL: 21.4 (ref 10–20)
CALCIUM BLD-MCNC: 9.4 MG/DL (ref 8.5–10.1)
CHLORIDE SERPL-SCNC: 107 MMOL/L (ref 98–112)
CHOLEST SMN-MCNC: 168 MG/DL (ref ?–200)
CO2 SERPL-SCNC: 29 MMOL/L (ref 21–32)
CREAT BLD-MCNC: 0.56 MG/DL
DEPRECATED RDW RBC AUTO: 45.1 FL (ref 35.1–46.3)
EOSINOPHIL # BLD AUTO: 0.15 X10(3) UL (ref 0–0.7)
EOSINOPHIL NFR BLD AUTO: 2.1 %
ERYTHROCYTE [DISTWIDTH] IN BLOOD BY AUTOMATED COUNT: 12.9 % (ref 11–15)
GLOBULIN PLAS-MCNC: 3.4 G/DL (ref 2.8–4.4)
GLUCOSE BLD-MCNC: 90 MG/DL (ref 70–99)
HCT VFR BLD AUTO: 42.8 %
HDLC SERPL-MCNC: 64 MG/DL (ref 40–59)
HGB BLD-MCNC: 14.1 G/DL
IMM GRANULOCYTES # BLD AUTO: 0.02 X10(3) UL (ref 0–1)
IMM GRANULOCYTES NFR BLD: 0.3 %
LDLC SERPL CALC-MCNC: 90 MG/DL (ref ?–100)
LYMPHOCYTES # BLD AUTO: 1.95 X10(3) UL (ref 1–4)
LYMPHOCYTES NFR BLD AUTO: 27.5 %
M PROTEIN MFR SERPL ELPH: 6.9 G/DL (ref 6.4–8.2)
MCH RBC QN AUTO: 31.3 PG (ref 26–34)
MCHC RBC AUTO-ENTMCNC: 32.9 G/DL (ref 31–37)
MCV RBC AUTO: 95.1 FL
MONOCYTES # BLD AUTO: 0.67 X10(3) UL (ref 0.1–1)
MONOCYTES NFR BLD AUTO: 9.5 %
NEUTROPHILS # BLD AUTO: 4.21 X10 (3) UL (ref 1.5–7.7)
NEUTROPHILS # BLD AUTO: 4.21 X10(3) UL (ref 1.5–7.7)
NEUTROPHILS NFR BLD AUTO: 59.5 %
NONHDLC SERPL-MCNC: 104 MG/DL (ref ?–130)
OSMOLALITY SERPL CALC.SUM OF ELEC: 283 MOSM/KG (ref 275–295)
PATIENT FASTING Y/N/NP: YES
PATIENT FASTING Y/N/NP: YES
PLATELET # BLD AUTO: 330 10(3)UL (ref 150–450)
POTASSIUM SERPL-SCNC: 4.9 MMOL/L (ref 3.5–5.1)
RBC # BLD AUTO: 4.5 X10(6)UL
SODIUM SERPL-SCNC: 137 MMOL/L (ref 136–145)
TRIGL SERPL-MCNC: 68 MG/DL (ref 30–149)
TSI SER-ACNC: 0.76 MIU/ML (ref 0.36–3.74)
VLDLC SERPL CALC-MCNC: 14 MG/DL (ref 0–30)
WBC # BLD AUTO: 7.1 X10(3) UL (ref 4–11)

## 2021-01-21 PROCEDURE — 80061 LIPID PANEL: CPT

## 2021-01-21 PROCEDURE — 84443 ASSAY THYROID STIM HORMONE: CPT

## 2021-01-21 PROCEDURE — 80053 COMPREHEN METABOLIC PANEL: CPT

## 2021-01-21 PROCEDURE — 85025 COMPLETE CBC W/AUTO DIFF WBC: CPT

## 2021-01-21 PROCEDURE — 36415 COLL VENOUS BLD VENIPUNCTURE: CPT

## 2021-01-27 ENCOUNTER — OFFICE VISIT (OUTPATIENT)
Dept: INTERNAL MEDICINE CLINIC | Facility: CLINIC | Age: 66
End: 2021-01-27
Payer: COMMERCIAL

## 2021-01-27 VITALS
BODY MASS INDEX: 21.7 KG/M2 | TEMPERATURE: 98 F | HEART RATE: 72 BPM | RESPIRATION RATE: 16 BRPM | SYSTOLIC BLOOD PRESSURE: 124 MMHG | HEIGHT: 63.39 IN | DIASTOLIC BLOOD PRESSURE: 80 MMHG | WEIGHT: 124 LBS

## 2021-01-27 DIAGNOSIS — Z86.000 HISTORY OF DUCTAL CARCINOMA IN SITU (DCIS) OF BREAST: ICD-10-CM

## 2021-01-27 DIAGNOSIS — Z12.4 CERVICAL CANCER SCREENING: ICD-10-CM

## 2021-01-27 DIAGNOSIS — Z11.51 SCREENING FOR HUMAN PAPILLOMAVIRUS (HPV): ICD-10-CM

## 2021-01-27 DIAGNOSIS — I10 ESSENTIAL HYPERTENSION: ICD-10-CM

## 2021-01-27 DIAGNOSIS — M85.859 OSTEOPENIA OF NECK OF FEMUR, UNSPECIFIED LATERALITY: ICD-10-CM

## 2021-01-27 DIAGNOSIS — F17.200 SMOKER: ICD-10-CM

## 2021-01-27 DIAGNOSIS — Z00.00 ANNUAL PHYSICAL EXAM: Primary | ICD-10-CM

## 2021-01-27 DIAGNOSIS — Z86.010 HISTORY OF COLON POLYPS: ICD-10-CM

## 2021-01-27 PROCEDURE — 3079F DIAST BP 80-89 MM HG: CPT | Performed by: PHYSICIAN ASSISTANT

## 2021-01-27 PROCEDURE — 88175 CYTOPATH C/V AUTO FLUID REDO: CPT | Performed by: PHYSICIAN ASSISTANT

## 2021-01-27 PROCEDURE — 3008F BODY MASS INDEX DOCD: CPT | Performed by: PHYSICIAN ASSISTANT

## 2021-01-27 PROCEDURE — 99396 PREV VISIT EST AGE 40-64: CPT | Performed by: PHYSICIAN ASSISTANT

## 2021-01-27 PROCEDURE — 87624 HPV HI-RISK TYP POOLED RSLT: CPT | Performed by: PHYSICIAN ASSISTANT

## 2021-01-27 PROCEDURE — 3074F SYST BP LT 130 MM HG: CPT | Performed by: PHYSICIAN ASSISTANT

## 2021-01-27 NOTE — PROGRESS NOTES
Patient presents with:  Physical: SN Rm 2; Pap due  Vaccinations: defers Flu      HPI:  Pt presents for annual physical.      Pt has a lot of stressors in her life. Grandson with addiction issues, custody garcia for pt's autistic niece.     HTN - Pt is com perforation or abscess without bleeding     Internal hemorrhoids     Hemorrhoids, external without complications     Abnormal feces      Past Medical History:   Diagnosis Date   • Cancer [facility])     Cancer - left breast   • Dry eye 10/30/2014   • Ductal carc Solution      • TRAVATAN Z 0.004 % Ophthalmic Solution      • Multiple Vitamin (ONE-DAILY MULTI VITAMINS) Oral Tab Take 1 tablet by mouth daily. • Cyanocobalamin (VITAMIN B 12 OR) Take by mouth. • COD LIVER OIL OR Take by mouth.          Allergies erythema. No pallor. Psychiatric: Normal mood and affect.      [facility] Lab Encounter on 01/21/2021   Component Date Value Ref Range Status   • TSH 01/21/2021 0.755  0.358 - 3.740 mIU/mL Final   • Cholesterol, Total 01/21/2021 168  <200 mg/dL Final   • HDL Cho Final   • MCH 01/21/2021 31.3  26.0 - 34.0 pg Final   • MCHC 01/21/2021 32.9  31.0 - 37.0 g/dL Final   • RDW 01/21/2021 12.9  11.0 - 15.0 % Final   • RDW-SD 01/21/2021 45.1  35.1 - 46.3 fL Final   • Neutrophil Absolute Prelim 01/21/2021 4.21  1.50 - 7.70 x 1 year (around 1/27/2022) for Annual physical.    Patient Instructions   Nino Snowden (try this for Calcium Vit D supplement)      All questions were answered and the patient understands the plan.

## 2021-01-28 LAB — HPV I/H RISK 1 DNA SPEC QL NAA+PROBE: NEGATIVE

## 2021-03-01 NOTE — PROGRESS NOTES
Breast Surgery Surveillance Visit    Diagnosis: DCIS, left breast; ER negative, [place] negative status post Left lumpectomy on December 12, 2016    Stage:  Cancer Staging  Ductal carcinoma in situ (DCIS) of left breast  Staging form: Breast, [address] imaging. The additional imaging confirmed a cluster of new pleomorphic calcifications at the 2:00 position of the left breast anterior depth for which stereotactic guided biopsy was recommended and performed. This confirmed DCIS, Grade 3, ER/CT negative.  Sedrick Arellano pregnancy.     Medications:      •  IRBESARTAN 150 MG Oral Tab, TAKE 1 TABLET BY MOUTH  DAILY, Disp: 90 tablet, Rfl: 0    •  ibuprofen (ADVIL) 200 MG Oral Tab, Take 200 mg by mouth every 6 (six) hours as needed for Pain., Disp: , Rfl:     •  Timolol Maleate of chest pain, chest pressure/discomfort, palpitations, irregular heartbeat, fainting or near-fainting, difficulty breathing when lying flat, SOB/Coughing at night, swelling of the legs or chest pain while walking.     Breasts:  See history of present illne (LMP Unknown)   SpO2 100%   BMI 21.63 kg/m²     Physical Examination: This is a well-nourished, alert and oriented woman. There is not palpable cervical, supraclavicular or axillary adenopathy.   The neck is supple with a midline trachea and no thyromegaly this with her annual mammogram in 6-month intervals.  She will return to see me in 12 months for clinical exam. I encouraged her to continue monitoring her ROM and strength and explained that a referral to physical therapy may be warranted in the future if

## 2021-03-05 ENCOUNTER — OFFICE VISIT (OUTPATIENT)
Dept: SURGERY | Facility: CLINIC | Age: 66
End: 2021-03-05
Payer: COMMERCIAL

## 2021-03-05 VITALS
SYSTOLIC BLOOD PRESSURE: 137 MMHG | OXYGEN SATURATION: 100 % | HEART RATE: 69 BPM | HEIGHT: 63.39 IN | DIASTOLIC BLOOD PRESSURE: 79 MMHG | BODY MASS INDEX: 21.63 KG/M2 | WEIGHT: 123.63 LBS | RESPIRATION RATE: 16 BRPM

## 2021-03-05 DIAGNOSIS — Z15.02 CHEK2-RELATED BREAST CANCER (HCC): Primary | ICD-10-CM

## 2021-03-05 DIAGNOSIS — Z12.39 BREAST CANCER SCREENING, HIGH RISK PATIENT: ICD-10-CM

## 2021-03-05 DIAGNOSIS — C50.919 CHEK2-RELATED BREAST CANCER (HCC): Primary | ICD-10-CM

## 2021-03-05 DIAGNOSIS — Z91.89 AT HIGH RISK FOR BREAST CANCER: ICD-10-CM

## 2021-03-05 DIAGNOSIS — Z15.09 CHEK2-RELATED BREAST CANCER (HCC): Primary | ICD-10-CM

## 2021-03-05 DIAGNOSIS — Z80.3 FAMILY HISTORY OF BREAST CANCER: ICD-10-CM

## 2021-03-05 DIAGNOSIS — Z15.89 CHEK2-RELATED BREAST CANCER (HCC): Primary | ICD-10-CM

## 2021-03-05 PROCEDURE — 3008F BODY MASS INDEX DOCD: CPT | Performed by: SURGERY

## 2021-03-05 PROCEDURE — 3078F DIAST BP <80 MM HG: CPT | Performed by: SURGERY

## 2021-03-05 PROCEDURE — 99214 OFFICE O/P EST MOD 30 MIN: CPT | Performed by: SURGERY

## 2021-03-05 PROCEDURE — 3075F SYST BP GE 130 - 139MM HG: CPT | Performed by: SURGERY

## 2021-03-18 DIAGNOSIS — I10 ESSENTIAL HYPERTENSION: ICD-10-CM

## 2021-03-19 RX ORDER — IRBESARTAN 150 MG/1
150 TABLET ORAL DAILY
Qty: 90 TABLET | Refills: 1 | Status: SHIPPED | OUTPATIENT
Start: 2021-03-19 | End: 2021-07-26

## 2021-06-25 ENCOUNTER — TELEPHONE (OUTPATIENT)
Dept: INTERNAL MEDICINE CLINIC | Facility: CLINIC | Age: 66
End: 2021-06-25

## 2021-06-25 NOTE — TELEPHONE ENCOUNTER
Patient reports inconsistent generalized itching that varies in location. Patient denies having dry skin- advised to try location, hydrate. Patient denies new products, SOB, drooling, swallow changes, cp, weakness, rash, etc. Patient scheduled next week with TB for evaluation given 3 week duration, strong er warning provided. Patient has tried allegra with no improvement. Patient states no symptoms at this but wanted to be seen for recommendations. Patient verbalized understanding of ER warnings and to call with any changes.

## 2021-06-30 ENCOUNTER — OFFICE VISIT (OUTPATIENT)
Dept: INTERNAL MEDICINE CLINIC | Facility: CLINIC | Age: 66
End: 2021-06-30
Payer: COMMERCIAL

## 2021-06-30 ENCOUNTER — LAB ENCOUNTER (OUTPATIENT)
Dept: LAB | Age: 66
End: 2021-06-30
Attending: INTERNAL MEDICINE
Payer: COMMERCIAL

## 2021-06-30 VITALS
TEMPERATURE: 98 F | HEART RATE: 80 BPM | SYSTOLIC BLOOD PRESSURE: 110 MMHG | WEIGHT: 122 LBS | DIASTOLIC BLOOD PRESSURE: 68 MMHG | RESPIRATION RATE: 16 BRPM | BODY MASS INDEX: 21.62 KG/M2 | HEIGHT: 63 IN

## 2021-06-30 DIAGNOSIS — L29.9 PRURITUS: ICD-10-CM

## 2021-06-30 DIAGNOSIS — L29.9 PRURITUS: Primary | ICD-10-CM

## 2021-06-30 LAB
ALBUMIN SERPL-MCNC: 3.8 G/DL (ref 3.4–5)
ALBUMIN/GLOB SERPL: 1.2 {RATIO} (ref 1–2)
ALP LIVER SERPL-CCNC: 96 U/L
ALT SERPL-CCNC: 21 U/L
ANION GAP SERPL CALC-SCNC: 3 MMOL/L (ref 0–18)
AST SERPL-CCNC: 13 U/L (ref 15–37)
BASOPHILS # BLD AUTO: 0.07 X10(3) UL (ref 0–0.2)
BASOPHILS NFR BLD AUTO: 0.8 %
BILIRUB SERPL-MCNC: 0.6 MG/DL (ref 0.1–2)
BUN BLD-MCNC: 11 MG/DL (ref 7–18)
BUN/CREAT SERPL: 22 (ref 10–20)
CALCIUM BLD-MCNC: 9.6 MG/DL (ref 8.5–10.1)
CHLORIDE SERPL-SCNC: 106 MMOL/L (ref 98–112)
CO2 SERPL-SCNC: 28 MMOL/L (ref 21–32)
CREAT BLD-MCNC: 0.5 MG/DL
DEPRECATED RDW RBC AUTO: 45.8 FL (ref 35.1–46.3)
EOSINOPHIL # BLD AUTO: 0.18 X10(3) UL (ref 0–0.7)
EOSINOPHIL NFR BLD AUTO: 2.1 %
ERYTHROCYTE [DISTWIDTH] IN BLOOD BY AUTOMATED COUNT: 12.8 % (ref 11–15)
GLOBULIN PLAS-MCNC: 3.2 G/DL (ref 2.8–4.4)
GLUCOSE BLD-MCNC: 91 MG/DL (ref 70–99)
HCT VFR BLD AUTO: 45.3 %
HGB BLD-MCNC: 14.9 G/DL
IMM GRANULOCYTES # BLD AUTO: 0.03 X10(3) UL (ref 0–1)
IMM GRANULOCYTES NFR BLD: 0.3 %
LYMPHOCYTES # BLD AUTO: 2.18 X10(3) UL (ref 1–4)
LYMPHOCYTES NFR BLD AUTO: 24.9 %
M PROTEIN MFR SERPL ELPH: 7 G/DL (ref 6.4–8.2)
MCH RBC QN AUTO: 31.7 PG (ref 26–34)
MCHC RBC AUTO-ENTMCNC: 32.9 G/DL (ref 31–37)
MCV RBC AUTO: 96.4 FL
MONOCYTES # BLD AUTO: 0.7 X10(3) UL (ref 0.1–1)
MONOCYTES NFR BLD AUTO: 8 %
NEUTROPHILS # BLD AUTO: 5.61 X10 (3) UL (ref 1.5–7.7)
NEUTROPHILS # BLD AUTO: 5.61 X10(3) UL (ref 1.5–7.7)
NEUTROPHILS NFR BLD AUTO: 63.9 %
OSMOLALITY SERPL CALC.SUM OF ELEC: 283 MOSM/KG (ref 275–295)
PATIENT FASTING Y/N/NP: NO
PLATELET # BLD AUTO: 346 10(3)UL (ref 150–450)
POTASSIUM SERPL-SCNC: 5.3 MMOL/L (ref 3.5–5.1)
RBC # BLD AUTO: 4.7 X10(6)UL
SODIUM SERPL-SCNC: 137 MMOL/L (ref 136–145)
T4 FREE SERPL-MCNC: 1.2 NG/DL (ref 0.8–1.7)
TSI SER-ACNC: 0.41 MIU/ML (ref 0.36–3.74)
WBC # BLD AUTO: 8.8 X10(3) UL (ref 4–11)

## 2021-06-30 PROCEDURE — 3078F DIAST BP <80 MM HG: CPT | Performed by: INTERNAL MEDICINE

## 2021-06-30 PROCEDURE — 80050 GENERAL HEALTH PANEL: CPT | Performed by: INTERNAL MEDICINE

## 2021-06-30 PROCEDURE — 3074F SYST BP LT 130 MM HG: CPT | Performed by: INTERNAL MEDICINE

## 2021-06-30 PROCEDURE — 84439 ASSAY OF FREE THYROXINE: CPT | Performed by: INTERNAL MEDICINE

## 2021-06-30 PROCEDURE — 99213 OFFICE O/P EST LOW 20 MIN: CPT | Performed by: INTERNAL MEDICINE

## 2021-06-30 PROCEDURE — 3008F BODY MASS INDEX DOCD: CPT | Performed by: INTERNAL MEDICINE

## 2021-06-30 NOTE — PROGRESS NOTES
Ziyad Stallworth is a 63year old female.   Patient presents with:  Derm Problem: SN Rm 3; multiple locations x 3wks, denies hives/rash, not using OTC antihistamines      HPI:     Patient c/o itching all over her body, especially her arms and legs, for ov hearing aide left ear   • Hearing loss 2016    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   • Smoker 10/30/2014   • Wears glasses 1969      Social History:  Social History    Tobacco Use      Smoking status: Current Every Day Smoker        Packs/day: 1.00        Years Placed This Encounter      CBC W Differential W Platelet [E]      Comp Metabolic Panel (14)      TSH and Free T4 [E]      Meds & Refills for this Visit:  Requested Prescriptions      No prescriptions requested or ordered in this encounter       Imaging & C

## 2021-07-01 ENCOUNTER — TELEPHONE (OUTPATIENT)
Dept: INTERNAL MEDICINE CLINIC | Facility: CLINIC | Age: 66
End: 2021-07-01

## 2021-07-01 DIAGNOSIS — E87.5 HYPERKALEMIA: Primary | ICD-10-CM

## 2021-07-12 ENCOUNTER — LAB ENCOUNTER (OUTPATIENT)
Dept: LAB | Facility: HOSPITAL | Age: 66
End: 2021-07-12
Attending: INTERNAL MEDICINE
Payer: COMMERCIAL

## 2021-07-12 DIAGNOSIS — E87.5 HYPERKALEMIA: ICD-10-CM

## 2021-07-12 LAB
ANION GAP SERPL CALC-SCNC: 1 MMOL/L (ref 0–18)
BUN BLD-MCNC: 10 MG/DL (ref 7–18)
BUN/CREAT SERPL: 18.2 (ref 10–20)
CALCIUM BLD-MCNC: 9.4 MG/DL (ref 8.5–10.1)
CHLORIDE SERPL-SCNC: 105 MMOL/L (ref 98–112)
CO2 SERPL-SCNC: 30 MMOL/L (ref 21–32)
CREAT BLD-MCNC: 0.55 MG/DL
GLUCOSE BLD-MCNC: 87 MG/DL (ref 70–99)
OSMOLALITY SERPL CALC.SUM OF ELEC: 280 MOSM/KG (ref 275–295)
PATIENT FASTING Y/N/NP: YES
POTASSIUM SERPL-SCNC: 5.2 MMOL/L (ref 3.5–5.1)
SODIUM SERPL-SCNC: 136 MMOL/L (ref 136–145)

## 2021-07-12 PROCEDURE — 80048 BASIC METABOLIC PNL TOTAL CA: CPT

## 2021-07-12 PROCEDURE — 36415 COLL VENOUS BLD VENIPUNCTURE: CPT

## 2021-07-13 ENCOUNTER — TELEPHONE (OUTPATIENT)
Dept: INTERNAL MEDICINE CLINIC | Facility: CLINIC | Age: 66
End: 2021-07-13

## 2021-07-13 DIAGNOSIS — E87.5 HYPERKALEMIA: Primary | ICD-10-CM

## 2021-07-25 DIAGNOSIS — I10 ESSENTIAL HYPERTENSION: ICD-10-CM

## 2021-07-26 ENCOUNTER — LAB ENCOUNTER (OUTPATIENT)
Dept: LAB | Facility: HOSPITAL | Age: 66
End: 2021-07-26
Attending: INTERNAL MEDICINE
Payer: COMMERCIAL

## 2021-07-26 DIAGNOSIS — E87.5 HYPERKALEMIA: ICD-10-CM

## 2021-07-26 LAB
ANION GAP SERPL CALC-SCNC: 3 MMOL/L (ref 0–18)
BUN BLD-MCNC: 9 MG/DL (ref 7–18)
BUN/CREAT SERPL: 18 (ref 10–20)
CALCIUM BLD-MCNC: 9.3 MG/DL (ref 8.5–10.1)
CHLORIDE SERPL-SCNC: 106 MMOL/L (ref 98–112)
CO2 SERPL-SCNC: 29 MMOL/L (ref 21–32)
CREAT BLD-MCNC: 0.5 MG/DL
GLUCOSE BLD-MCNC: 75 MG/DL (ref 70–99)
OSMOLALITY SERPL CALC.SUM OF ELEC: 283 MOSM/KG (ref 275–295)
PATIENT FASTING Y/N/NP: NO
POTASSIUM SERPL-SCNC: 4.2 MMOL/L (ref 3.5–5.1)
SODIUM SERPL-SCNC: 138 MMOL/L (ref 136–145)

## 2021-07-26 PROCEDURE — 80048 BASIC METABOLIC PNL TOTAL CA: CPT

## 2021-07-26 PROCEDURE — 36415 COLL VENOUS BLD VENIPUNCTURE: CPT

## 2021-07-26 RX ORDER — IRBESARTAN 150 MG/1
TABLET ORAL
Qty: 90 TABLET | Refills: 1 | Status: SHIPPED | OUTPATIENT
Start: 2021-07-26 | End: 2022-01-12

## 2021-08-11 ENCOUNTER — HOSPITAL ENCOUNTER (OUTPATIENT)
Dept: MRI IMAGING | Facility: HOSPITAL | Age: 66
Discharge: HOME | End: 2021-08-11
Attending: SURGERY
Payer: COMMERCIAL

## 2021-08-11 DIAGNOSIS — C50.919 CHEK2-RELATED BREAST CANCER (HCC): ICD-10-CM

## 2021-08-11 DIAGNOSIS — Z15.02 CHEK2-RELATED BREAST CANCER (HCC): ICD-10-CM

## 2021-08-11 DIAGNOSIS — Z15.89 CHEK2-RELATED BREAST CANCER (HCC): ICD-10-CM

## 2021-08-11 DIAGNOSIS — Z15.09 CHEK2-RELATED BREAST CANCER (HCC): ICD-10-CM

## 2021-08-11 PROCEDURE — A9575 INJ GADOTERATE MEGLUMI 0.1ML: HCPCS | Performed by: SURGERY

## 2021-08-11 PROCEDURE — 77049 MRI BREAST C-+ W/CAD BI: CPT | Performed by: SURGERY

## 2021-12-16 ENCOUNTER — HOSPITAL ENCOUNTER (OUTPATIENT)
Dept: MAMMOGRAPHY | Facility: HOSPITAL | Age: 66
Discharge: HOME | End: 2021-12-16
Attending: SURGERY
Payer: COMMERCIAL

## 2021-12-16 DIAGNOSIS — Z12.39 BREAST CANCER SCREENING, HIGH RISK PATIENT: ICD-10-CM

## 2021-12-16 PROCEDURE — 77063 BREAST TOMOSYNTHESIS BI: CPT | Performed by: SURGERY

## 2021-12-16 PROCEDURE — 77067 SCR MAMMO BI INCL CAD: CPT | Performed by: SURGERY

## 2022-01-11 DIAGNOSIS — Z13.220 LIPID SCREENING: ICD-10-CM

## 2022-01-11 DIAGNOSIS — Z13.1 DIABETES MELLITUS SCREENING: Primary | ICD-10-CM

## 2022-01-11 DIAGNOSIS — I10 ESSENTIAL HYPERTENSION: ICD-10-CM

## 2022-01-12 RX ORDER — IRBESARTAN 150 MG/1
TABLET ORAL
Qty: 90 TABLET | Refills: 0 | Status: SHIPPED | OUTPATIENT
Start: 2022-01-12

## 2022-01-12 NOTE — TELEPHONE ENCOUNTER
Last VISIT 06/30/21    Last CPE 01/27/21     Last REFILL 07/26/21 qty 90 w/1 refill    Last LABS 07/26/21 BMP done    No Future Appointments      Per PROTOCOL? Failed       Please Approve or Deny.

## 2022-01-26 ENCOUNTER — LAB ENCOUNTER (OUTPATIENT)
Dept: LAB | Age: 67
End: 2022-01-26
Attending: PHYSICIAN ASSISTANT
Payer: COMMERCIAL

## 2022-01-26 DIAGNOSIS — Z13.1 DIABETES MELLITUS SCREENING: ICD-10-CM

## 2022-01-26 DIAGNOSIS — I10 ESSENTIAL HYPERTENSION: ICD-10-CM

## 2022-01-26 DIAGNOSIS — Z13.220 LIPID SCREENING: ICD-10-CM

## 2022-01-26 LAB
ALBUMIN SERPL-MCNC: 3.3 G/DL (ref 3.4–5)
ALBUMIN/GLOB SERPL: 1 {RATIO} (ref 1–2)
ALP LIVER SERPL-CCNC: 99 U/L
ALT SERPL-CCNC: 17 U/L
ANION GAP SERPL CALC-SCNC: 2 MMOL/L (ref 0–18)
AST SERPL-CCNC: 7 U/L (ref 15–37)
BILIRUB SERPL-MCNC: 0.7 MG/DL (ref 0.1–2)
BUN BLD-MCNC: 9 MG/DL (ref 7–18)
CALCIUM BLD-MCNC: 9.3 MG/DL (ref 8.5–10.1)
CHLORIDE SERPL-SCNC: 106 MMOL/L (ref 98–112)
CHOLEST SERPL-MCNC: 162 MG/DL (ref ?–200)
CO2 SERPL-SCNC: 30 MMOL/L (ref 21–32)
CREAT BLD-MCNC: 0.53 MG/DL
FASTING PATIENT LIPID ANSWER: YES
FASTING STATUS PATIENT QL REPORTED: YES
GLOBULIN PLAS-MCNC: 3.2 G/DL (ref 2.8–4.4)
GLUCOSE BLD-MCNC: 86 MG/DL (ref 70–99)
HDLC SERPL-MCNC: 51 MG/DL (ref 40–59)
LDLC SERPL CALC-MCNC: 97 MG/DL (ref ?–100)
NONHDLC SERPL-MCNC: 111 MG/DL (ref ?–130)
OSMOLALITY SERPL CALC.SUM OF ELEC: 284 MOSM/KG (ref 275–295)
POTASSIUM SERPL-SCNC: 4.7 MMOL/L (ref 3.5–5.1)
PROT SERPL-MCNC: 6.5 G/DL (ref 6.4–8.2)
SODIUM SERPL-SCNC: 138 MMOL/L (ref 136–145)
TRIGL SERPL-MCNC: 71 MG/DL (ref 30–149)
VLDLC SERPL CALC-MCNC: 12 MG/DL (ref 0–30)

## 2022-01-26 PROCEDURE — 80053 COMPREHEN METABOLIC PANEL: CPT | Performed by: PHYSICIAN ASSISTANT

## 2022-01-26 PROCEDURE — 80061 LIPID PANEL: CPT | Performed by: PHYSICIAN ASSISTANT

## 2022-02-03 ENCOUNTER — OFFICE VISIT (OUTPATIENT)
Dept: INTERNAL MEDICINE CLINIC | Facility: CLINIC | Age: 67
End: 2022-02-03
Payer: COMMERCIAL

## 2022-02-03 VITALS
OXYGEN SATURATION: 98 % | SYSTOLIC BLOOD PRESSURE: 131 MMHG | RESPIRATION RATE: 20 BRPM | HEART RATE: 92 BPM | HEIGHT: 63 IN | TEMPERATURE: 97 F | WEIGHT: 126 LBS | DIASTOLIC BLOOD PRESSURE: 71 MMHG | BODY MASS INDEX: 22.32 KG/M2

## 2022-02-03 DIAGNOSIS — I10 ESSENTIAL HYPERTENSION: ICD-10-CM

## 2022-02-03 DIAGNOSIS — Z15.01 BIALLELIC MUTATION OF CHEK2 GENE: ICD-10-CM

## 2022-02-03 DIAGNOSIS — Z00.00 ANNUAL PHYSICAL EXAM: Primary | ICD-10-CM

## 2022-02-03 DIAGNOSIS — Z86.000 HISTORY OF DUCTAL CARCINOMA IN SITU (DCIS) OF BREAST: ICD-10-CM

## 2022-02-03 DIAGNOSIS — Z72.0 TOBACCO USE: ICD-10-CM

## 2022-02-03 DIAGNOSIS — F32.A ANXIETY AND DEPRESSION: ICD-10-CM

## 2022-02-03 DIAGNOSIS — Z15.09 BIALLELIC MUTATION OF CHEK2 GENE: ICD-10-CM

## 2022-02-03 DIAGNOSIS — Z12.2 SCREENING FOR LUNG CANCER: ICD-10-CM

## 2022-02-03 DIAGNOSIS — Z86.010 HISTORY OF COLON POLYPS: ICD-10-CM

## 2022-02-03 DIAGNOSIS — K57.30 DIVERTICULOSIS OF LARGE INTESTINE WITHOUT PERFORATION OR ABSCESS WITHOUT BLEEDING: ICD-10-CM

## 2022-02-03 DIAGNOSIS — F41.9 ANXIETY AND DEPRESSION: ICD-10-CM

## 2022-02-03 PROCEDURE — 3075F SYST BP GE 130 - 139MM HG: CPT | Performed by: PHYSICIAN ASSISTANT

## 2022-02-03 PROCEDURE — 3008F BODY MASS INDEX DOCD: CPT | Performed by: PHYSICIAN ASSISTANT

## 2022-02-03 PROCEDURE — 99396 PREV VISIT EST AGE 40-64: CPT | Performed by: PHYSICIAN ASSISTANT

## 2022-02-03 PROCEDURE — 3078F DIAST BP <80 MM HG: CPT | Performed by: PHYSICIAN ASSISTANT

## 2022-02-03 RX ORDER — BUPROPION HYDROCHLORIDE 150 MG/1
150 TABLET ORAL DAILY
Qty: 30 TABLET | Refills: 0 | Status: SHIPPED | OUTPATIENT
Start: 2022-02-03 | End: 2022-03-03

## 2022-02-28 ENCOUNTER — OFFICE VISIT (OUTPATIENT)
Dept: SURGERY | Facility: CLINIC | Age: 67
End: 2022-02-28
Payer: COMMERCIAL

## 2022-02-28 VITALS
RESPIRATION RATE: 16 BRPM | OXYGEN SATURATION: 71 % | HEIGHT: 63 IN | BODY MASS INDEX: 22.15 KG/M2 | WEIGHT: 125 LBS | SYSTOLIC BLOOD PRESSURE: 148 MMHG | TEMPERATURE: 98 F | HEART RATE: 71 BPM | DIASTOLIC BLOOD PRESSURE: 81 MMHG

## 2022-02-28 DIAGNOSIS — C50.919 CHEK2-RELATED BREAST CANCER (HCC): Primary | ICD-10-CM

## 2022-02-28 DIAGNOSIS — Z15.02 CHEK2-RELATED BREAST CANCER (HCC): Primary | ICD-10-CM

## 2022-02-28 DIAGNOSIS — Z15.89 CHEK2-RELATED BREAST CANCER (HCC): Primary | ICD-10-CM

## 2022-02-28 DIAGNOSIS — Z12.39 BREAST CANCER SCREENING, HIGH RISK PATIENT: ICD-10-CM

## 2022-02-28 DIAGNOSIS — Z15.09 CHEK2-RELATED BREAST CANCER (HCC): Primary | ICD-10-CM

## 2022-02-28 PROCEDURE — 3077F SYST BP >= 140 MM HG: CPT | Performed by: SURGERY

## 2022-02-28 PROCEDURE — 3079F DIAST BP 80-89 MM HG: CPT | Performed by: SURGERY

## 2022-02-28 PROCEDURE — 3008F BODY MASS INDEX DOCD: CPT | Performed by: SURGERY

## 2022-02-28 PROCEDURE — 99213 OFFICE O/P EST LOW 20 MIN: CPT | Performed by: SURGERY

## 2022-03-02 ENCOUNTER — HOSPITAL ENCOUNTER (OUTPATIENT)
Dept: CT IMAGING | Facility: HOSPITAL | Age: 67
Discharge: HOME | End: 2022-03-02
Attending: PHYSICIAN ASSISTANT
Payer: COMMERCIAL

## 2022-03-02 DIAGNOSIS — Z72.0 TOBACCO USE: ICD-10-CM

## 2022-03-02 DIAGNOSIS — Z12.2 SCREENING FOR LUNG CANCER: ICD-10-CM

## 2022-03-02 PROCEDURE — 71271 CT THORAX LUNG CANCER SCR C-: CPT | Performed by: PHYSICIAN ASSISTANT

## 2022-03-03 PROBLEM — R91.8 MULTIPLE LUNG NODULES ON CT: Status: ACTIVE | Noted: 2022-03-03

## 2022-04-07 RX ORDER — IRBESARTAN 150 MG/1
TABLET ORAL
Qty: 90 TABLET | Refills: 1 | Status: SHIPPED | OUTPATIENT
Start: 2022-04-07

## 2022-08-28 ENCOUNTER — HOSPITAL ENCOUNTER (OUTPATIENT)
Dept: MRI IMAGING | Facility: HOSPITAL | Age: 67
Discharge: HOME | End: 2022-08-28
Attending: SURGERY
Payer: COMMERCIAL

## 2022-08-28 ENCOUNTER — HOSPITAL ENCOUNTER (OUTPATIENT)
Dept: MRI IMAGING | Facility: HOSPITAL | Age: 67
End: 2022-08-28
Attending: SURGERY
Payer: COMMERCIAL

## 2022-08-28 DIAGNOSIS — Z15.02 CHEK2-RELATED BREAST CANCER (HCC): ICD-10-CM

## 2022-08-28 DIAGNOSIS — Z15.09 CHEK2-RELATED BREAST CANCER (HCC): ICD-10-CM

## 2022-08-28 DIAGNOSIS — Z12.39 BREAST CANCER SCREENING, HIGH RISK PATIENT: ICD-10-CM

## 2022-08-28 DIAGNOSIS — C50.919 CHEK2-RELATED BREAST CANCER (HCC): ICD-10-CM

## 2022-08-28 DIAGNOSIS — Z15.89 CHEK2-RELATED BREAST CANCER (HCC): ICD-10-CM

## 2022-08-28 PROCEDURE — 77049 MRI BREAST C-+ W/CAD BI: CPT | Performed by: SURGERY

## 2022-08-28 PROCEDURE — A9575 INJ GADOTERATE MEGLUMI 0.1ML: HCPCS | Performed by: SURGERY

## 2022-10-27 ENCOUNTER — OFFICE VISIT (OUTPATIENT)
Dept: INTERNAL MEDICINE CLINIC | Facility: CLINIC | Age: 67
End: 2022-10-27
Payer: COMMERCIAL

## 2022-10-27 ENCOUNTER — HOSPITAL ENCOUNTER (OUTPATIENT)
Dept: GENERAL RADIOLOGY | Age: 67
Discharge: HOME | End: 2022-10-27
Attending: INTERNAL MEDICINE
Payer: COMMERCIAL

## 2022-10-27 VITALS
DIASTOLIC BLOOD PRESSURE: 68 MMHG | SYSTOLIC BLOOD PRESSURE: 130 MMHG | BODY MASS INDEX: 22.97 KG/M2 | HEIGHT: 62.99 IN | RESPIRATION RATE: 18 BRPM | HEART RATE: 76 BPM | WEIGHT: 129.63 LBS | OXYGEN SATURATION: 98 % | TEMPERATURE: 99 F

## 2022-10-27 DIAGNOSIS — I10 BENIGN ESSENTIAL HTN: ICD-10-CM

## 2022-10-27 DIAGNOSIS — M25.561 CHRONIC PAIN OF RIGHT KNEE: ICD-10-CM

## 2022-10-27 DIAGNOSIS — G89.29 CHRONIC PAIN OF RIGHT KNEE: ICD-10-CM

## 2022-10-27 DIAGNOSIS — G89.29 CHRONIC PAIN OF RIGHT KNEE: Primary | ICD-10-CM

## 2022-10-27 DIAGNOSIS — M25.561 CHRONIC PAIN OF RIGHT KNEE: Primary | ICD-10-CM

## 2022-10-27 PROCEDURE — 3075F SYST BP GE 130 - 139MM HG: CPT | Performed by: INTERNAL MEDICINE

## 2022-10-27 PROCEDURE — 73564 X-RAY EXAM KNEE 4 OR MORE: CPT | Performed by: INTERNAL MEDICINE

## 2022-10-27 PROCEDURE — 3078F DIAST BP <80 MM HG: CPT | Performed by: INTERNAL MEDICINE

## 2022-10-27 PROCEDURE — 99213 OFFICE O/P EST LOW 20 MIN: CPT | Performed by: INTERNAL MEDICINE

## 2022-10-27 PROCEDURE — 3008F BODY MASS INDEX DOCD: CPT | Performed by: INTERNAL MEDICINE

## 2022-10-27 RX ORDER — MELOXICAM 15 MG/1
15 TABLET ORAL DAILY
Qty: 30 TABLET | Refills: 0 | Status: SHIPPED | OUTPATIENT
Start: 2022-10-27

## 2022-12-02 ENCOUNTER — OFFICE VISIT (OUTPATIENT)
Dept: ORTHOPEDICS CLINIC | Facility: CLINIC | Age: 67
End: 2022-12-02
Payer: MEDICARE

## 2022-12-02 VITALS — BODY MASS INDEX: 22.63 KG/M2 | OXYGEN SATURATION: 95 % | WEIGHT: 123 LBS | HEART RATE: 71 BPM | HEIGHT: 62 IN

## 2022-12-02 DIAGNOSIS — M25.461 EFFUSION OF RIGHT KNEE: Primary | ICD-10-CM

## 2022-12-02 DIAGNOSIS — M17.11 OSTEOARTHRITIS OF RIGHT KNEE, UNSPECIFIED OSTEOARTHRITIS TYPE: ICD-10-CM

## 2022-12-02 PROCEDURE — 89050 BODY FLUID CELL COUNT: CPT | Performed by: ORTHOPAEDIC SURGERY

## 2022-12-02 PROCEDURE — 89060 EXAM SYNOVIAL FLUID CRYSTALS: CPT | Performed by: ORTHOPAEDIC SURGERY

## 2022-12-02 RX ORDER — TRIAMCINOLONE ACETONIDE 40 MG/ML
40 INJECTION, SUSPENSION INTRA-ARTICULAR; INTRAMUSCULAR ONCE
Status: COMPLETED | OUTPATIENT
Start: 2022-12-02 | End: 2022-12-02

## 2022-12-02 RX ADMIN — TRIAMCINOLONE ACETONIDE 40 MG: 40 INJECTION, SUSPENSION INTRA-ARTICULAR; INTRAMUSCULAR at 11:04:00

## 2022-12-02 NOTE — PROCEDURES
After informed consent, the patient's right knee was marked and prepped with antiseptic solution. Local anesthetic was used to create a skin wheal near the superolateral aspiration site. It was reprepped with antiseptic solution, and an 18-gauge needle was inserted through the superolateral portal site, into the knee joint deep to the patella. Aspiration was performed and returned 30 mL of slightly cloudy, less viscous yellowish joint fluid. Fluid was sent for pathology. The knee was then injected with a mixture of 1mL 40mg/mL Kenalog, 2mL 1% lidocaine and 2mL 0.5% marcaine through the superolateral portal.  A band-aid was applied. The patient tolerated the procedure well.     Jie Oconnell MD, [address] Orthopedic Surgery  Phone [phone]  Fax [phone]

## 2022-12-03 LAB
BASOPHILS NFR SNV: 0 %
COLOR FLD: YELLOW
CRYSTALS SNV QL MICRO: NEGATIVE
EOSINOPHIL NFR SNV: 0 %
GRANULOCYTES # SNV AUTO: 8930 /MM3 (ref 0–200)
LYMPHOCYTES NFR SNV: 2 %
MONOS+MACROS NFR SNV: 4 %
NEUTROPHILS NFR SNV: 94 %
RBC # FLD AUTO: <3000 /MM3 (ref ?–1)
TOTAL CELLS COUNTED FLD: 100

## 2022-12-09 ENCOUNTER — TELEPHONE (OUTPATIENT)
Dept: ORTHOPEDICS CLINIC | Facility: CLINIC | Age: 67
End: 2022-12-09

## 2022-12-09 DIAGNOSIS — M25.461 EFFUSION OF RIGHT KNEE: ICD-10-CM

## 2022-12-09 DIAGNOSIS — M17.11 OSTEOARTHRITIS OF RIGHT KNEE, UNSPECIFIED OSTEOARTHRITIS TYPE: Primary | ICD-10-CM

## 2022-12-09 NOTE — TELEPHONE ENCOUNTER
Patient called to ask Dr. Jennifer Lou who does he wants her to see for Rheumatologist. Please advise. Thanks.     Patient can be reached at [phone]

## 2022-12-09 NOTE — TELEPHONE ENCOUNTER
Referral pended for approval   [facility] [address] [place] [address]  [address] [facility]  [place], [address]  [phone]

## 2022-12-12 DIAGNOSIS — Z15.02 CHEK2-RELATED BREAST CANCER (HCC): ICD-10-CM

## 2022-12-12 DIAGNOSIS — Z15.89 CHEK2-RELATED BREAST CANCER (HCC): ICD-10-CM

## 2022-12-12 DIAGNOSIS — Z12.31 ENCOUNTER FOR SCREENING MAMMOGRAM FOR HIGH-RISK PATIENT: Primary | ICD-10-CM

## 2022-12-12 DIAGNOSIS — C50.919 CHEK2-RELATED BREAST CANCER (HCC): ICD-10-CM

## 2022-12-12 DIAGNOSIS — Z91.89 AT HIGH RISK FOR BREAST CANCER: ICD-10-CM

## 2022-12-12 DIAGNOSIS — Z15.09 CHEK2-RELATED BREAST CANCER (HCC): ICD-10-CM

## 2022-12-28 ENCOUNTER — HOSPITAL ENCOUNTER (OUTPATIENT)
Dept: MAMMOGRAPHY | Facility: HOSPITAL | Age: 67
Discharge: HOME | End: 2022-12-28
Attending: SURGERY
Payer: MEDICARE

## 2022-12-28 DIAGNOSIS — Z15.09 CHEK2-RELATED BREAST CANCER (HCC): ICD-10-CM

## 2022-12-28 DIAGNOSIS — Z12.31 ENCOUNTER FOR SCREENING MAMMOGRAM FOR HIGH-RISK PATIENT: ICD-10-CM

## 2022-12-28 DIAGNOSIS — C50.919 CHEK2-RELATED BREAST CANCER (HCC): ICD-10-CM

## 2022-12-28 DIAGNOSIS — Z91.89 AT HIGH RISK FOR BREAST CANCER: ICD-10-CM

## 2022-12-28 DIAGNOSIS — Z15.02 CHEK2-RELATED BREAST CANCER (HCC): ICD-10-CM

## 2022-12-28 DIAGNOSIS — Z15.89 CHEK2-RELATED BREAST CANCER (HCC): ICD-10-CM

## 2022-12-28 PROCEDURE — 77063 BREAST TOMOSYNTHESIS BI: CPT | Performed by: SURGERY

## 2022-12-28 PROCEDURE — 77067 SCR MAMMO BI INCL CAD: CPT | Performed by: SURGERY

## 2023-01-10 DIAGNOSIS — I10 ESSENTIAL HYPERTENSION: ICD-10-CM

## 2023-01-11 RX ORDER — IRBESARTAN 150 MG/1
TABLET ORAL
Qty: 90 TABLET | Refills: 0 | Status: SHIPPED | OUTPATIENT
Start: 2023-01-11

## 2023-02-22 ENCOUNTER — LAB ENCOUNTER (OUTPATIENT)
Dept: LAB | Facility: HOSPITAL | Age: 68
End: 2023-02-22
Attending: PHYSICIAN ASSISTANT
Payer: MEDICARE

## 2023-02-22 ENCOUNTER — TELEPHONE (OUTPATIENT)
Dept: SURGERY | Facility: CLINIC | Age: 68
End: 2023-02-22

## 2023-02-22 ENCOUNTER — HOSPITAL ENCOUNTER (OUTPATIENT)
Dept: CT IMAGING | Facility: HOSPITAL | Age: 68
Discharge: HOME | End: 2023-02-22
Attending: PHYSICIAN ASSISTANT
Payer: MEDICARE

## 2023-02-22 ENCOUNTER — OFFICE VISIT (OUTPATIENT)
Dept: INTERNAL MEDICINE CLINIC | Facility: CLINIC | Age: 68
End: 2023-02-22
Payer: MEDICARE

## 2023-02-22 VITALS
OXYGEN SATURATION: 97 % | SYSTOLIC BLOOD PRESSURE: 131 MMHG | HEIGHT: 62 IN | DIASTOLIC BLOOD PRESSURE: 62 MMHG | BODY MASS INDEX: 23.37 KG/M2 | WEIGHT: 127 LBS | HEART RATE: 88 BPM | RESPIRATION RATE: 16 BRPM

## 2023-02-22 DIAGNOSIS — R35.0 URINARY FREQUENCY: ICD-10-CM

## 2023-02-22 DIAGNOSIS — Z72.0 TOBACCO USE: ICD-10-CM

## 2023-02-22 DIAGNOSIS — R31.0 GROSS HEMATURIA: ICD-10-CM

## 2023-02-22 DIAGNOSIS — R10.32 LLQ PAIN: ICD-10-CM

## 2023-02-22 DIAGNOSIS — R33.9 INCOMPLETE BLADDER EMPTYING: ICD-10-CM

## 2023-02-22 DIAGNOSIS — R31.0 GROSS HEMATURIA: Primary | ICD-10-CM

## 2023-02-22 LAB
ALBUMIN SERPL-MCNC: 3.3 G/DL (ref 3.4–5)
ALBUMIN/GLOB SERPL: 0.8 {RATIO} (ref 1–2)
ALP LIVER SERPL-CCNC: 106 U/L
ALT SERPL-CCNC: 17 U/L
ANION GAP SERPL CALC-SCNC: 1 MMOL/L (ref 0–18)
APPEARANCE: CLEAR
AST SERPL-CCNC: 10 U/L (ref 15–37)
BASOPHILS # BLD AUTO: 0.09 X10(3) UL (ref 0–0.2)
BASOPHILS NFR BLD AUTO: 0.9 %
BILIRUB SERPL-MCNC: 0.3 MG/DL (ref 0.1–2)
BILIRUBIN: NEGATIVE
BUN BLD-MCNC: 9 MG/DL (ref 7–18)
CALCIUM BLD-MCNC: 9.5 MG/DL (ref 8.5–10.1)
CHLORIDE SERPL-SCNC: 104 MMOL/L (ref 98–112)
CO2 SERPL-SCNC: 30 MMOL/L (ref 21–32)
CREAT BLD-MCNC: 0.48 MG/DL
CREAT BLD-MCNC: 0.5 MG/DL
EOSINOPHIL # BLD AUTO: 0.17 X10(3) UL (ref 0–0.7)
EOSINOPHIL NFR BLD AUTO: 1.7 %
ERYTHROCYTE [DISTWIDTH] IN BLOOD BY AUTOMATED COUNT: 13.1 %
GFR SERPLBLD BASED ON 1.73 SQ M-ARVRAT: 104 ML/MIN/1.73M2 (ref 60–?)
GFR SERPLBLD BASED ON 1.73 SQ M-ARVRAT: 105 ML/MIN/1.73M2 (ref 60–?)
GLOBULIN PLAS-MCNC: 4 G/DL (ref 2.8–4.4)
GLUCOSE (URINE DIPSTICK): NEGATIVE MG/DL
GLUCOSE BLD-MCNC: 84 MG/DL (ref 70–99)
HCT VFR BLD AUTO: 37.5 %
HGB BLD-MCNC: 12.4 G/DL
IMM GRANULOCYTES # BLD AUTO: 0.03 X10(3) UL (ref 0–1)
IMM GRANULOCYTES NFR BLD: 0.3 %
KETONES (URINE DIPSTICK): NEGATIVE MG/DL
LEUKOCYTES: NEGATIVE
LYMPHOCYTES # BLD AUTO: 2.32 X10(3) UL (ref 1–4)
LYMPHOCYTES NFR BLD AUTO: 23.7 %
MCH RBC QN AUTO: 30.3 PG (ref 26–34)
MCHC RBC AUTO-ENTMCNC: 33.1 G/DL (ref 31–37)
MCV RBC AUTO: 91.7 FL
MONOCYTES # BLD AUTO: 0.72 X10(3) UL (ref 0.1–1)
MONOCYTES NFR BLD AUTO: 7.3 %
MULTISTIX LOT#: ABNORMAL NUMERIC
NEUTROPHILS # BLD AUTO: 6.47 X10 (3) UL (ref 1.5–7.7)
NEUTROPHILS # BLD AUTO: 6.47 X10(3) UL (ref 1.5–7.7)
NEUTROPHILS NFR BLD AUTO: 66.1 %
NITRITE, URINE: NEGATIVE
OSMOLALITY SERPL CALC.SUM OF ELEC: 278 MOSM/KG (ref 275–295)
PH, URINE: 7 (ref 4.5–8)
PLATELET # BLD AUTO: 520 10(3)UL (ref 150–450)
POTASSIUM SERPL-SCNC: 4.5 MMOL/L (ref 3.5–5.1)
PROT SERPL-MCNC: 7.3 G/DL (ref 6.4–8.2)
PROTEIN (URINE DIPSTICK): NEGATIVE MG/DL
RBC # BLD AUTO: 4.09 X10(6)UL
SODIUM SERPL-SCNC: 135 MMOL/L (ref 136–145)
SPECIFIC GRAVITY: 1.01 (ref 1–1.03)
UROBILINOGEN,SEMI-QN: 0.2 MG/DL (ref 0–1.9)
WBC # BLD AUTO: 9.8 X10(3) UL (ref 4–11)

## 2023-02-22 PROCEDURE — 82565 ASSAY OF CREATININE: CPT

## 2023-02-22 PROCEDURE — 36415 COLL VENOUS BLD VENIPUNCTURE: CPT

## 2023-02-22 PROCEDURE — 85025 COMPLETE CBC W/AUTO DIFF WBC: CPT

## 2023-02-22 PROCEDURE — 81003 URINALYSIS AUTO W/O SCOPE: CPT | Performed by: PHYSICIAN ASSISTANT

## 2023-02-22 PROCEDURE — 99214 OFFICE O/P EST MOD 30 MIN: CPT | Performed by: PHYSICIAN ASSISTANT

## 2023-02-22 PROCEDURE — 80053 COMPREHEN METABOLIC PANEL: CPT

## 2023-02-22 PROCEDURE — 74178 CT ABD&PLV WO CNTR FLWD CNTR: CPT | Performed by: PHYSICIAN ASSISTANT

## 2023-02-22 NOTE — TELEPHONE ENCOUNTER
Per Krzysztof Chowdhury, Dr. Robert Acuna is requesting to speak to Dr. Thomas Bustillo in regards to CT abdomen.  Would be new pt, Please advise

## 2023-02-22 NOTE — TELEPHONE ENCOUNTER
PCP contacted [facility] because pt has gross hematuria and mass found on CT. I contacted the pt. She confirmed that she does have gross hematuria, but her urine stream is strong and normal for her. I scheduled the pt for a consult and pos cysto on Friday per [facility]. I discussed the procedure and instruction with the pt. Pt verbalized understanding and all questions were answered.

## 2023-02-24 ENCOUNTER — PROCEDURE (OUTPATIENT)
Dept: SURGERY | Facility: CLINIC | Age: 68
End: 2023-02-24

## 2023-02-24 ENCOUNTER — TELEPHONE (OUTPATIENT)
Dept: SURGERY | Facility: CLINIC | Age: 68
End: 2023-02-24

## 2023-02-24 DIAGNOSIS — R31.0 GROSS HEMATURIA: ICD-10-CM

## 2023-02-24 DIAGNOSIS — D49.4 BLADDER TUMOR: Primary | ICD-10-CM

## 2023-02-24 DIAGNOSIS — N32.89 BLADDER MASS: Primary | ICD-10-CM

## 2023-02-24 LAB
APPEARANCE: CLEAR
BILIRUBIN: NEGATIVE
GLUCOSE (URINE DIPSTICK): NEGATIVE MG/DL
KETONES (URINE DIPSTICK): NEGATIVE MG/DL
MULTISTIX LOT#: ABNORMAL NUMERIC
NITRITE, URINE: NEGATIVE
PH, URINE: 7 (ref 4.5–8)
SPECIFIC GRAVITY: 1.02 (ref 1–1.03)
URINE-COLOR: YELLOW
UROBILINOGEN,SEMI-QN: 1 MG/DL (ref 0–1.9)

## 2023-02-24 RX ORDER — SULFAMETHOXAZOLE AND TRIMETHOPRIM 800; 160 MG/1; MG/1
1 TABLET ORAL ONCE
Status: COMPLETED | OUTPATIENT
Start: 2023-02-24 | End: 2023-02-24

## 2023-02-24 RX ADMIN — SULFAMETHOXAZOLE AND TRIMETHOPRIM 1 TABLET: 800; 160 TABLET ORAL at 09:10:00

## 2023-02-24 NOTE — TELEPHONE ENCOUNTER
Please schedule this patient for surgery. She's hoping to do this within the next couple weeks. Thanks,    Effie Portland    Urology Surgery Request  Surgeon: Bruno Schneider (if known): EDW  Procedure: cysto, TURBT with right RPG and possible stent placement, intravesical instillation of gemcitabine   Anesthesia: General   Time Frame: within the next few weeks  Time required: 45 minutes  Diagnosis: bladder tumor    Antibiotics: per hospital protocol unless checked below   _x_ Levaquin 500 mg IV   _x_ Gemcitabine 2 g/100 mL NS bladder instillation to be given in OR    Estimated Post Op/Follow Up Appt: ~ 2 weeks for post-op with me. Please schedule appointment at time of surgery scheduling.

## 2023-02-28 NOTE — TELEPHONE ENCOUNTER
Spoke with the patient, scheduling surgery for 4/13/23. Reviewed pre-op instruction with the patient and sent via My Chart. Patient will contact me with any questions at [phone].   Also scheduled the post op visit for 4/28/23 @ 3pm.

## 2023-02-28 NOTE — TELEPHONE ENCOUNTER
Contacted the patient, spoke w/, Vitaliy Velasco. Stated that I found a sooner appt and will reschedule the surgery for 3/23/23. Put in surgical case change on this date.

## 2023-03-01 ENCOUNTER — LAB ENCOUNTER (OUTPATIENT)
Dept: LAB | Facility: HOSPITAL | Age: 68
End: 2023-03-01
Attending: UROLOGY
Payer: MEDICARE

## 2023-03-01 ENCOUNTER — EKG ENCOUNTER (OUTPATIENT)
Dept: LAB | Facility: HOSPITAL | Age: 68
End: 2023-03-01
Attending: UROLOGY
Payer: MEDICARE

## 2023-03-01 DIAGNOSIS — Z01.818 PRE-OP TESTING: ICD-10-CM

## 2023-03-01 LAB — SARS-COV-2 RNA RESP QL NAA+PROBE: NOT DETECTED

## 2023-03-01 PROCEDURE — 93005 ELECTROCARDIOGRAM TRACING: CPT

## 2023-03-01 PROCEDURE — 93010 ELECTROCARDIOGRAM REPORT: CPT | Performed by: INTERNAL MEDICINE

## 2023-03-02 ENCOUNTER — APPOINTMENT (OUTPATIENT)
Dept: GENERAL RADIOLOGY | Facility: HOSPITAL | Age: 68
End: 2023-03-02
Attending: UROLOGY
Payer: MEDICARE

## 2023-03-02 ENCOUNTER — ANESTHESIA (OUTPATIENT)
Dept: SURGERY | Facility: HOSPITAL | Age: 68
End: 2023-03-02
Payer: MEDICARE

## 2023-03-02 ENCOUNTER — HOSPITAL ENCOUNTER (OUTPATIENT)
Facility: HOSPITAL | Age: 68
Setting detail: HOSPITAL OUTPATIENT SURGERY
Discharge: HOME | End: 2023-03-02
Attending: UROLOGY | Admitting: UROLOGY
Payer: MEDICARE

## 2023-03-02 ENCOUNTER — TELEPHONE (OUTPATIENT)
Dept: SURGERY | Facility: CLINIC | Age: 68
End: 2023-03-02

## 2023-03-02 ENCOUNTER — ANESTHESIA EVENT (OUTPATIENT)
Dept: SURGERY | Facility: HOSPITAL | Age: 68
End: 2023-03-02
Payer: MEDICARE

## 2023-03-02 VITALS
BODY MASS INDEX: 22.63 KG/M2 | SYSTOLIC BLOOD PRESSURE: 103 MMHG | TEMPERATURE: 98 F | HEART RATE: 78 BPM | DIASTOLIC BLOOD PRESSURE: 51 MMHG | HEIGHT: 62 IN | WEIGHT: 123 LBS | OXYGEN SATURATION: 97 % | RESPIRATION RATE: 16 BRPM

## 2023-03-02 DIAGNOSIS — D49.4 BLADDER TUMOR: ICD-10-CM

## 2023-03-02 DIAGNOSIS — Z01.818 PRE-OP TESTING: Primary | ICD-10-CM

## 2023-03-02 LAB
ATRIAL RATE: 82 BPM
P AXIS: 80 DEGREES
P-R INTERVAL: 146 MS
Q-T INTERVAL: 338 MS
QRS DURATION: 68 MS
QTC CALCULATION (BEZET): 394 MS
R AXIS: 85 DEGREES
T AXIS: 70 DEGREES
VENTRICULAR RATE: 82 BPM

## 2023-03-02 PROCEDURE — 74420 UROGRAPHY RTRGR +-KUB: CPT | Performed by: UROLOGY

## 2023-03-02 PROCEDURE — BT1D1ZZ FLUOROSCOPY OF RIGHT KIDNEY, URETER AND BLADDER USING LOW OSMOLAR CONTRAST: ICD-10-PCS | Performed by: RADIOLOGY

## 2023-03-02 PROCEDURE — 52235 CYSTOSCOPY AND TREATMENT: CPT | Performed by: UROLOGY

## 2023-03-02 PROCEDURE — 3E0K705 INTRODUCTION OF OTHER ANTINEOPLASTIC INTO GENITOURINARY TRACT, VIA NATURAL OR ARTIFICIAL OPENING: ICD-10-PCS | Performed by: UROLOGY

## 2023-03-02 PROCEDURE — 0TBB8ZZ EXCISION OF BLADDER, VIA NATURAL OR ARTIFICIAL OPENING ENDOSCOPIC: ICD-10-PCS | Performed by: UROLOGY

## 2023-03-02 RX ORDER — SCOLOPAMINE TRANSDERMAL SYSTEM 1 MG/1
1 PATCH, EXTENDED RELEASE TRANSDERMAL ONCE
Status: DISCONTINUED | OUTPATIENT
Start: 2023-03-02 | End: 2023-03-02 | Stop reason: HOSPADM

## 2023-03-02 RX ORDER — HYDROMORPHONE HYDROCHLORIDE 1 MG/ML
0.4 INJECTION, SOLUTION INTRAMUSCULAR; INTRAVENOUS; SUBCUTANEOUS EVERY 5 MIN PRN
Status: DISCONTINUED | OUTPATIENT
Start: 2023-03-02 | End: 2023-03-02

## 2023-03-02 RX ORDER — CIPROFLOXACIN 500 MG/1
500 TABLET, FILM COATED ORAL 2 TIMES DAILY
Qty: 8 TABLET | Refills: 0 | Status: SHIPPED | OUTPATIENT
Start: 2023-03-02 | End: 2023-03-06

## 2023-03-02 RX ORDER — ACETAMINOPHEN 500 MG
1000 TABLET ORAL ONCE
Status: COMPLETED | OUTPATIENT
Start: 2023-03-02 | End: 2023-03-02

## 2023-03-02 RX ORDER — LIDOCAINE HYDROCHLORIDE 10 MG/ML
INJECTION, SOLUTION EPIDURAL; INFILTRATION; INTRACAUDAL; PERINEURAL AS NEEDED
Status: DISCONTINUED | OUTPATIENT
Start: 2023-03-02 | End: 2023-03-02 | Stop reason: SURG

## 2023-03-02 RX ORDER — HYDROCODONE BITARTRATE AND ACETAMINOPHEN 5; 325 MG/1; MG/1
2 TABLET ORAL ONCE AS NEEDED
Status: DISCONTINUED | OUTPATIENT
Start: 2023-03-02 | End: 2023-03-02

## 2023-03-02 RX ORDER — LEVOFLOXACIN 5 MG/ML
500 INJECTION, SOLUTION INTRAVENOUS ONCE
Status: COMPLETED | OUTPATIENT
Start: 2023-03-02 | End: 2023-03-02

## 2023-03-02 RX ORDER — EPHEDRINE SULFATE 50 MG/ML
INJECTION INTRAVENOUS AS NEEDED
Status: DISCONTINUED | OUTPATIENT
Start: 2023-03-02 | End: 2023-03-02 | Stop reason: SURG

## 2023-03-02 RX ORDER — NALOXONE HYDROCHLORIDE 0.4 MG/ML
80 INJECTION, SOLUTION INTRAMUSCULAR; INTRAVENOUS; SUBCUTANEOUS AS NEEDED
Status: DISCONTINUED | OUTPATIENT
Start: 2023-03-02 | End: 2023-03-02

## 2023-03-02 RX ORDER — ONDANSETRON 2 MG/ML
4 INJECTION INTRAMUSCULAR; INTRAVENOUS EVERY 6 HOURS PRN
Status: DISCONTINUED | OUTPATIENT
Start: 2023-03-02 | End: 2023-03-02

## 2023-03-02 RX ORDER — DIPHENHYDRAMINE HYDROCHLORIDE 50 MG/ML
12.5 INJECTION INTRAMUSCULAR; INTRAVENOUS AS NEEDED
Status: DISCONTINUED | OUTPATIENT
Start: 2023-03-02 | End: 2023-03-02

## 2023-03-02 RX ORDER — ACETAMINOPHEN 500 MG
1000 TABLET ORAL ONCE AS NEEDED
Status: DISCONTINUED | OUTPATIENT
Start: 2023-03-02 | End: 2023-03-02

## 2023-03-02 RX ORDER — ONDANSETRON 2 MG/ML
INJECTION INTRAMUSCULAR; INTRAVENOUS AS NEEDED
Status: DISCONTINUED | OUTPATIENT
Start: 2023-03-02 | End: 2023-03-02 | Stop reason: SURG

## 2023-03-02 RX ORDER — MIDAZOLAM HYDROCHLORIDE 1 MG/ML
1 INJECTION INTRAMUSCULAR; INTRAVENOUS EVERY 5 MIN PRN
Status: DISCONTINUED | OUTPATIENT
Start: 2023-03-02 | End: 2023-03-02

## 2023-03-02 RX ORDER — METOCLOPRAMIDE HYDROCHLORIDE 5 MG/ML
10 INJECTION INTRAMUSCULAR; INTRAVENOUS EVERY 8 HOURS PRN
Status: DISCONTINUED | OUTPATIENT
Start: 2023-03-02 | End: 2023-03-02

## 2023-03-02 RX ORDER — MIDAZOLAM HYDROCHLORIDE 1 MG/ML
INJECTION INTRAMUSCULAR; INTRAVENOUS AS NEEDED
Status: DISCONTINUED | OUTPATIENT
Start: 2023-03-02 | End: 2023-03-02 | Stop reason: SURG

## 2023-03-02 RX ORDER — SODIUM CHLORIDE, SODIUM LACTATE, POTASSIUM CHLORIDE, CALCIUM CHLORIDE 600; 310; 30; 20 MG/100ML; MG/100ML; MG/100ML; MG/100ML
INJECTION, SOLUTION INTRAVENOUS CONTINUOUS
Status: DISCONTINUED | OUTPATIENT
Start: 2023-03-02 | End: 2023-03-02

## 2023-03-02 RX ORDER — HYDROMORPHONE HYDROCHLORIDE 1 MG/ML
0.2 INJECTION, SOLUTION INTRAMUSCULAR; INTRAVENOUS; SUBCUTANEOUS EVERY 5 MIN PRN
Status: DISCONTINUED | OUTPATIENT
Start: 2023-03-02 | End: 2023-03-02

## 2023-03-02 RX ORDER — HYDROCODONE BITARTRATE AND ACETAMINOPHEN 5; 325 MG/1; MG/1
1 TABLET ORAL EVERY 6 HOURS PRN
Qty: 30 TABLET | Refills: 0 | Status: SHIPPED | OUTPATIENT
Start: 2023-03-02

## 2023-03-02 RX ORDER — PHENYLEPHRINE HCL 10 MG/ML
VIAL (ML) INJECTION AS NEEDED
Status: DISCONTINUED | OUTPATIENT
Start: 2023-03-02 | End: 2023-03-02 | Stop reason: SURG

## 2023-03-02 RX ORDER — MEPERIDINE HYDROCHLORIDE 25 MG/ML
12.5 INJECTION INTRAMUSCULAR; INTRAVENOUS; SUBCUTANEOUS AS NEEDED
Status: DISCONTINUED | OUTPATIENT
Start: 2023-03-02 | End: 2023-03-02

## 2023-03-02 RX ORDER — DOCUSATE SODIUM 100 MG/1
100 CAPSULE, LIQUID FILLED ORAL 2 TIMES DAILY PRN
Qty: 30 CAPSULE | Refills: 0 | Status: SHIPPED | OUTPATIENT
Start: 2023-03-02

## 2023-03-02 RX ORDER — HYDROCODONE BITARTRATE AND ACETAMINOPHEN 5; 325 MG/1; MG/1
1 TABLET ORAL ONCE AS NEEDED
Status: DISCONTINUED | OUTPATIENT
Start: 2023-03-02 | End: 2023-03-02

## 2023-03-02 RX ORDER — HYDROMORPHONE HYDROCHLORIDE 1 MG/ML
0.6 INJECTION, SOLUTION INTRAMUSCULAR; INTRAVENOUS; SUBCUTANEOUS EVERY 5 MIN PRN
Status: DISCONTINUED | OUTPATIENT
Start: 2023-03-02 | End: 2023-03-02

## 2023-03-02 RX ORDER — DEXAMETHASONE SODIUM PHOSPHATE 4 MG/ML
VIAL (ML) INJECTION AS NEEDED
Status: DISCONTINUED | OUTPATIENT
Start: 2023-03-02 | End: 2023-03-02 | Stop reason: SURG

## 2023-03-02 RX ADMIN — SODIUM CHLORIDE, SODIUM LACTATE, POTASSIUM CHLORIDE, CALCIUM CHLORIDE: 600; 310; 30; 20 INJECTION, SOLUTION INTRAVENOUS at 08:10:00

## 2023-03-02 RX ADMIN — MIDAZOLAM HYDROCHLORIDE 2 MG: 1 INJECTION INTRAMUSCULAR; INTRAVENOUS at 07:21:00

## 2023-03-02 RX ADMIN — DEXAMETHASONE SODIUM PHOSPHATE 8 MG: 4 MG/ML VIAL (ML) INJECTION at 07:30:00

## 2023-03-02 RX ADMIN — EPHEDRINE SULFATE 10 MG: 50 INJECTION INTRAVENOUS at 07:57:00

## 2023-03-02 RX ADMIN — LIDOCAINE HYDROCHLORIDE 50 MG: 10 INJECTION, SOLUTION EPIDURAL; INFILTRATION; INTRACAUDAL; PERINEURAL at 07:24:00

## 2023-03-02 RX ADMIN — SODIUM CHLORIDE, SODIUM LACTATE, POTASSIUM CHLORIDE, CALCIUM CHLORIDE: 600; 310; 30; 20 INJECTION, SOLUTION INTRAVENOUS at 07:21:00

## 2023-03-02 RX ADMIN — EPHEDRINE SULFATE 5 MG: 50 INJECTION INTRAVENOUS at 07:36:00

## 2023-03-02 RX ADMIN — PHENYLEPHRINE HCL 100 MCG: 10 MG/ML VIAL (ML) INJECTION at 07:48:00

## 2023-03-02 RX ADMIN — PHENYLEPHRINE HCL 100 MCG: 10 MG/ML VIAL (ML) INJECTION at 07:30:00

## 2023-03-02 RX ADMIN — ONDANSETRON 4 MG: 2 INJECTION INTRAMUSCULAR; INTRAVENOUS at 07:06:00

## 2023-03-02 RX ADMIN — PHENYLEPHRINE HCL 100 MCG: 10 MG/ML VIAL (ML) INJECTION at 07:42:00

## 2023-03-02 RX ADMIN — LEVOFLOXACIN 500 MG: 5 INJECTION, SOLUTION INTRAVENOUS at 07:30:00

## 2023-03-02 NOTE — TELEPHONE ENCOUNTER
Please call this patient or their family and schedule the patient for a follow up with me in ~ 2 weeks for 15 min post-op.  You can cancel their original f/u scheduled with me in April    Thanks,    [facility]

## 2023-03-02 NOTE — INTERVAL H&P NOTE
Pre-op Diagnosis: Bladder tumor [D49.4]    The above referenced H&P was reviewed by Marc Patel MD on 3/2/2023, the patient was examined and no significant changes have occurred in the patient's condition since the H&P was performed. I discussed with the patient and/or legal representative the potential benefits, risks and side effects of this procedure; the likelihood of the patient achieving goals; and potential problems that might occur during recuperation. I discussed reasonable alternatives to the procedure, including risks, benefits and side effects related to the alternatives and risks related to not receiving this procedure. We will proceed with procedure as planned.

## 2023-03-02 NOTE — OPERATIVE REPORT
Urology Operative Note    Attending Surgeon: Chaitanya Salguero MD    Patient Name: Mary Beth Cano    Date of Surgery: 3/2/2023    Preoperative Diagnosis: bladder tumor    Postoperative Diagnosis: same    Procedure Performed: Cystoscopy, transurethral resection of bladder tumor with intravesical instillation of gemcitabine, right retrograde pyelogram    Indication for procedure:  Patient is a 74year old female who presented with gross hematuria and was found to have bladder tumor present on cystoscopy. The patient had one large tumor present on cysto, measuring approximately 4 cm in size and in close proximity to the right ureteral orifice. The patient was counseled on options and elected to undergo the aforementioned procedure. We discussed the risks and benefits to surgery. We discussed risks including, but not limited to, bleeding, infection, possible damage to surrounding structures, possible need for temporary smith catheter following the procedure. We also discussed the risks and benefits to intravesical instillation of gemcitabine. The patient understood these risks and wished to proceed with surgery. Description of the procedure: The patient was taken to the operating room and prepped and draped in lithotomy position after undergoing general anesthesia. The cystoscope was inserted. The bladder tumor burden was easily identified. We then proceeded with transurethral resection of bladder tumor. All visible tumor burden was completely resected. All oozing blood vessels were identified and fulgurated. I then performed right retrograde pyelogram which was normal with prompt excretion of contrast and no signs of obstruction. No residual tumor or bleeding was noted at the end of the case. The bladder was drained and the scope was removed. I then inserted an 18F smith catheter and instilled intravesical gemcitabine. The smith was clamped for 30 minutes. The bladder tumor specimens were sent to pathology. The patient was awoken having tolerated the procedure well. Specimen: bladder tumor    Complications: No known complications    Condition on Discharge from the operating room was stable    Plan: The chemotherapy will be drained 30 minutes from instillation and the smith catheter removed. The patient will follow up with me in 7-10 days to review pathology.     Marisela Matute MD  Date: 3/2/2023  Time: 8:14 AM

## 2023-03-02 NOTE — DISCHARGE INSTRUCTIONS
You had a procedure called a CYSTOSCOPY today    - No heavy lifting or strenuous activity for 1 WEEK. You may resume regular activity tomorrow. - You may have a post-operative appointment that is already scheduled for you. If the appointment date or time does not work with your schedule please call our office to reschedule ([phone]). Alternatively our office may be reaching out to you to schedule the appointment. - You may experience pain after the procedure for 1-2 days. If pain becomes intolerable please contact our office or go to the nearest Emergency Room/Immediate Care. You make take over-the counter ibuprofen for mild pain (provided you do not have a medical condition such as stomach ulcers or kidney disease which prohibits you from taking). You may take this in addition to tylenol or narcotic pain medication. Hot packs may help for discomfort as well. - If you take blood thinners (such as aspirin or plavix) please DO NOT take them when you go home. You may resume these medications in 2 WEEKS. - If you are medically allowed to take ibuprofen then you may take 200-400 mg every 8 hours as needed for pain with plenty of fluids. You may take this in addition to tylenol or other pain medication which may be prescribed. - You may experience burning with urination and frequency of urination over the next few days.     - You may see blood in your urine that should clear up within a few days. If you have a stent in place then you may see blood in the urine until the stent is eventually removed. - Try to abstain from alcohol, coffee, tea, artificial sweeteners, and spicy food for the next 48 hours as these can irritate the bladder.     - If you develop a fever, chills, difficulty urinating or abdominal pain in the next 24 hours, call the office.     - Drink 1.5 to 2 liters of fluid today, water is preferable.  If you are on a fluid restriction due to other medical reasons then you need to adhere to your fluid restriction recommendations.

## 2023-03-02 NOTE — ANESTHESIA PROCEDURE NOTES
Airway  Date/Time: 3/2/2023 7:25 AM  Urgency: elective    Airway not difficult    General Information and Staff    Patient location during procedure: OR  Anesthesiologist: Evgeny Valladares MD  Resident/CRNA: Ye Hart CRNA  Performed: CRNA   Performed by: Ye Hart CRNA  Authorized by: Evgeny Valladares MD      Indications and Patient Condition  Indications for airway management: anesthesia  Sedation level: deep  Preoxygenated: yes  Patient position: sniffing  Mask difficulty assessment: 1 - vent by mask    Final Airway Details  Final airway type: supraglottic airway      Successful airway: classic  Size 3       Number of attempts at approach: 1

## 2023-03-03 NOTE — TELEPHONE ENCOUNTER
Patient states she spoke to a nurse for a post op for 03/20 at 11 am. Appointment no on file.  Please advise

## 2023-03-06 ENCOUNTER — OFFICE VISIT (OUTPATIENT)
Dept: SURGERY | Facility: CLINIC | Age: 68
End: 2023-03-06

## 2023-03-06 DIAGNOSIS — R31.0 GROSS HEMATURIA: ICD-10-CM

## 2023-03-06 DIAGNOSIS — C67.2 MALIGNANT NEOPLASM OF LATERAL WALL OF URINARY BLADDER (HCC): Primary | ICD-10-CM

## 2023-03-06 LAB
APPEARANCE: CLEAR
BILIRUBIN: NEGATIVE
GLUCOSE (URINE DIPSTICK): NEGATIVE MG/DL
KETONES (URINE DIPSTICK): NEGATIVE MG/DL
MULTISTIX LOT#: ABNORMAL NUMERIC
NITRITE, URINE: NEGATIVE
PH, URINE: 6.5 (ref 4.5–8)
PROTEIN (URINE DIPSTICK): 30 MG/DL
SPECIFIC GRAVITY: 1.02 (ref 1–1.03)
URINE-COLOR: YELLOW
UROBILINOGEN,SEMI-QN: 0.2 MG/DL (ref 0–1.9)

## 2023-03-06 PROCEDURE — 81002 URINALYSIS NONAUTO W/O SCOPE: CPT | Performed by: UROLOGY

## 2023-03-06 PROCEDURE — 99215 OFFICE O/P EST HI 40 MIN: CPT | Performed by: UROLOGY

## 2023-03-08 ENCOUNTER — TELEPHONE (OUTPATIENT)
Dept: SURGERY | Facility: CLINIC | Age: 68
End: 2023-03-08

## 2023-03-15 ENCOUNTER — TELEPHONE (OUTPATIENT)
Dept: INTERNAL MEDICINE CLINIC | Facility: CLINIC | Age: 68
End: 2023-03-15

## 2023-03-15 DIAGNOSIS — I10 ESSENTIAL HYPERTENSION: Primary | ICD-10-CM

## 2023-03-15 DIAGNOSIS — Z00.00 ENCOUNTER FOR ANNUAL HEALTH EXAMINATION: ICD-10-CM

## 2023-03-15 NOTE — TELEPHONE ENCOUNTER
Orders to THE [facility] Pt aware to get labs done no sooner than 2 weeks prior to the appt. Pt aware to fast.  No call back required.     Future Appointments   Date Time Provider Jennifer Arredondo   3/29/2023  1:00 PM Maria Teresa Canas PA-C [facility]

## 2023-03-15 NOTE — TELEPHONE ENCOUNTER
Labs completed per THE [facility] protocol on 2/22/23 for cmp, and cbc. Placed lipid and tsh per protocol.

## 2023-03-20 ENCOUNTER — OFFICE VISIT (OUTPATIENT)
Dept: RHEUMATOLOGY | Facility: CLINIC | Age: 68
End: 2023-03-20
Payer: MEDICARE

## 2023-03-20 VITALS
WEIGHT: 124.38 LBS | SYSTOLIC BLOOD PRESSURE: 140 MMHG | DIASTOLIC BLOOD PRESSURE: 68 MMHG | HEIGHT: 62 IN | BODY MASS INDEX: 22.89 KG/M2 | RESPIRATION RATE: 16 BRPM | TEMPERATURE: 97 F | HEART RATE: 78 BPM

## 2023-03-20 DIAGNOSIS — Z51.81 THERAPEUTIC DRUG MONITORING: ICD-10-CM

## 2023-03-20 DIAGNOSIS — Z11.59 NEED FOR HEPATITIS C SCREENING TEST: ICD-10-CM

## 2023-03-20 DIAGNOSIS — M25.50 ARTHRALGIA, UNSPECIFIED JOINT: ICD-10-CM

## 2023-03-20 DIAGNOSIS — Z11.1 SCREENING FOR TUBERCULOSIS: ICD-10-CM

## 2023-03-20 DIAGNOSIS — Z11.59 NEED FOR HEPATITIS B SCREENING TEST: ICD-10-CM

## 2023-03-20 DIAGNOSIS — M19.90 INFLAMMATORY ARTHRITIS: Primary | ICD-10-CM

## 2023-03-20 DIAGNOSIS — M10.9 GOUT, UNSPECIFIED CAUSE, UNSPECIFIED CHRONICITY, UNSPECIFIED SITE: ICD-10-CM

## 2023-03-20 DIAGNOSIS — F17.200 SMOKER: ICD-10-CM

## 2023-03-20 DIAGNOSIS — M11.20 CHONDROCALCINOSIS: ICD-10-CM

## 2023-03-20 PROCEDURE — 99205 OFFICE O/P NEW HI 60 MIN: CPT | Performed by: INTERNAL MEDICINE

## 2023-03-20 RX ORDER — COLCHICINE 0.6 MG/1
0.6 TABLET ORAL DAILY
Qty: 180 TABLET | Refills: 1 | Status: SHIPPED | OUTPATIENT
Start: 2023-03-20 | End: 2023-03-20

## 2023-03-20 RX ORDER — ACETAMINOPHEN 500 MG
500 TABLET ORAL EVERY 6 HOURS PRN
COMMUNITY

## 2023-03-20 RX ORDER — COLCHICINE 0.6 MG/1
0.6 TABLET ORAL 2 TIMES DAILY
Qty: 180 TABLET | Refills: 1 | Status: SHIPPED | OUTPATIENT
Start: 2023-03-20

## 2023-03-20 NOTE — PATIENT INSTRUCTIONS
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 If you have diarrhea, decrease to 1 tab daily. If colchicine is expensive at [facility]. Call our office and we will re-route the prescription to the following pharmacy. Check out the following pharmacy. It may save you some money. [facility]. Phone: [phone]  [email or web address]      ==============================================================================================================    I recommend starting a medication called colchicine which I sent to your pharmacy. This is a medication that is derived from a purple flower called the jordon crocus. This medication has been utilized since the times of ancient [place], around 1500 BCE. So it is over 200 years old. The ancient [place] used this medication to treat gout and other arthritis. Colchicine also comes from another flower called the flame haile. So it is a very natural therapy. Even though it is quite the old medication, in the last several years, there have been new uses found for this medication. Cardiologists (heart doctors) have been using this medication to prevent heart attacks. There have been many large clinical trials showing that this medication can decrease heart attacks by up to 30%. So the medication is good for the joints and the heart, which is not easy to pull off. .     Some people get diarrhea with the medication so I will start you at a low dose of 1 pill each day. It may take several weeks for the colchicine to improve the hand swelling.

## 2023-03-22 ENCOUNTER — LAB ENCOUNTER (OUTPATIENT)
Dept: LAB | Age: 68
End: 2023-03-22
Attending: PHYSICIAN ASSISTANT
Payer: MEDICARE

## 2023-03-22 DIAGNOSIS — Z51.81 THERAPEUTIC DRUG MONITORING: ICD-10-CM

## 2023-03-22 DIAGNOSIS — M25.50 ARTHRALGIA, UNSPECIFIED JOINT: ICD-10-CM

## 2023-03-22 DIAGNOSIS — Z11.59 NEED FOR HEPATITIS B SCREENING TEST: ICD-10-CM

## 2023-03-22 DIAGNOSIS — Z11.59 NEED FOR HEPATITIS C SCREENING TEST: ICD-10-CM

## 2023-03-22 DIAGNOSIS — I10 ESSENTIAL HYPERTENSION: ICD-10-CM

## 2023-03-22 DIAGNOSIS — Z00.00 ENCOUNTER FOR ANNUAL HEALTH EXAMINATION: ICD-10-CM

## 2023-03-22 DIAGNOSIS — Z11.1 SCREENING FOR TUBERCULOSIS: ICD-10-CM

## 2023-03-22 DIAGNOSIS — M19.90 INFLAMMATORY ARTHRITIS: ICD-10-CM

## 2023-03-22 DIAGNOSIS — M11.20 CHONDROCALCINOSIS: ICD-10-CM

## 2023-03-22 LAB
CHOLEST SERPL-MCNC: 169 MG/DL (ref ?–200)
CRP SERPL-MCNC: 0.87 MG/DL (ref ?–0.3)
DEPRECATED HBV CORE AB SER IA-ACNC: 61.4 NG/ML
ERYTHROCYTE [SEDIMENTATION RATE] IN BLOOD: 44 MM/HR
FASTING PATIENT LIPID ANSWER: YES
HBV CORE AB SERPL QL IA: NONREACTIVE
HBV SURFACE AB SER QL: NONREACTIVE
HBV SURFACE AB SERPL IA-ACNC: <3.1 MIU/ML
HBV SURFACE AG SER-ACNC: 0.15 [IU]/L
HBV SURFACE AG SERPL QL IA: NONREACTIVE
HCV AB SERPL QL IA: NONREACTIVE
HDLC SERPL-MCNC: 51 MG/DL (ref 40–59)
LDLC SERPL CALC-MCNC: 103 MG/DL (ref ?–100)
MAGNESIUM SERPL-MCNC: 2.2 MG/DL (ref 1.6–2.6)
NONHDLC SERPL-MCNC: 118 MG/DL (ref ?–130)
PHOSPHATE SERPL-MCNC: 3.5 MG/DL (ref 2.5–4.9)
PTH-INTACT SERPL-MCNC: 55.4 PG/ML (ref 18.5–88)
RHEUMATOID FACT SERPL-ACNC: <10 IU/ML (ref ?–15)
TRIGL SERPL-MCNC: 77 MG/DL (ref 30–149)
TSI SER-ACNC: 0.45 MIU/ML (ref 0.36–3.74)
URATE SERPL-MCNC: 4.4 MG/DL
VLDLC SERPL CALC-MCNC: 13 MG/DL (ref 0–30)

## 2023-03-22 PROCEDURE — 83970 ASSAY OF PARATHORMONE: CPT

## 2023-03-22 PROCEDURE — 85652 RBC SED RATE AUTOMATED: CPT

## 2023-03-22 PROCEDURE — 86803 HEPATITIS C AB TEST: CPT

## 2023-03-22 PROCEDURE — 86200 CCP ANTIBODY: CPT

## 2023-03-22 PROCEDURE — 86706 HEP B SURFACE ANTIBODY: CPT

## 2023-03-22 PROCEDURE — 86038 ANTINUCLEAR ANTIBODIES: CPT

## 2023-03-22 PROCEDURE — 82728 ASSAY OF FERRITIN: CPT

## 2023-03-22 PROCEDURE — 86431 RHEUMATOID FACTOR QUANT: CPT

## 2023-03-22 PROCEDURE — 86225 DNA ANTIBODY NATIVE: CPT

## 2023-03-22 PROCEDURE — 84100 ASSAY OF PHOSPHORUS: CPT

## 2023-03-22 PROCEDURE — 83735 ASSAY OF MAGNESIUM: CPT

## 2023-03-22 PROCEDURE — 86480 TB TEST CELL IMMUN MEASURE: CPT

## 2023-03-22 PROCEDURE — 86704 HEP B CORE ANTIBODY TOTAL: CPT

## 2023-03-22 PROCEDURE — 86140 C-REACTIVE PROTEIN: CPT

## 2023-03-22 PROCEDURE — 84443 ASSAY THYROID STIM HORMONE: CPT

## 2023-03-22 PROCEDURE — 84550 ASSAY OF BLOOD/URIC ACID: CPT

## 2023-03-22 PROCEDURE — 80061 LIPID PANEL: CPT

## 2023-03-22 PROCEDURE — 87340 HEPATITIS B SURFACE AG IA: CPT

## 2023-03-23 LAB
CCP IGG SERPL-ACNC: 1.5 U/ML (ref 0–6.9)
DSDNA IGG SERPL IA-ACNC: 1.2 IU/ML
ENA AB SER QL IA: 0.2 UG/L
ENA AB SER QL IA: NEGATIVE

## 2023-03-24 LAB
M TB IFN-G CD4+ T-CELLS BLD-ACNC: 0.03 IU/ML
M TB TUBERC IFN-G BLD QL: NEGATIVE
M TB TUBERC IGNF/MITOGEN IGNF CONTROL: >10 IU/ML
QFT TB1 AG MINUS NIL: -0.01 IU/ML
QFT TB2 AG MINUS NIL: 0 IU/ML

## 2023-03-29 ENCOUNTER — OFFICE VISIT (OUTPATIENT)
Dept: INTERNAL MEDICINE CLINIC | Facility: CLINIC | Age: 68
End: 2023-03-29
Payer: MEDICARE

## 2023-03-29 VITALS
HEIGHT: 62.6 IN | BODY MASS INDEX: 22.25 KG/M2 | DIASTOLIC BLOOD PRESSURE: 60 MMHG | RESPIRATION RATE: 18 BRPM | SYSTOLIC BLOOD PRESSURE: 132 MMHG | HEART RATE: 82 BPM | TEMPERATURE: 98 F | WEIGHT: 124 LBS | OXYGEN SATURATION: 96 %

## 2023-03-29 DIAGNOSIS — K57.30 DIVERTICULOSIS OF LARGE INTESTINE WITHOUT PERFORATION OR ABSCESS WITHOUT BLEEDING: ICD-10-CM

## 2023-03-29 DIAGNOSIS — Z12.2 SCREENING FOR LUNG CANCER: ICD-10-CM

## 2023-03-29 DIAGNOSIS — Z12.11 COLON CANCER SCREENING: ICD-10-CM

## 2023-03-29 DIAGNOSIS — Z86.010 HISTORY OF COLON POLYPS: ICD-10-CM

## 2023-03-29 DIAGNOSIS — M85.89 OSTEOPENIA OF MULTIPLE SITES: ICD-10-CM

## 2023-03-29 DIAGNOSIS — C67.2 MALIGNANT NEOPLASM OF LATERAL WALL OF URINARY BLADDER (HCC): ICD-10-CM

## 2023-03-29 DIAGNOSIS — Z00.00 ENCOUNTER FOR ANNUAL HEALTH EXAMINATION: Primary | ICD-10-CM

## 2023-03-29 DIAGNOSIS — F17.210 NICOTINE DEPENDENCE, CIGARETTES, UNCOMPLICATED: ICD-10-CM

## 2023-03-29 DIAGNOSIS — R91.8 MULTIPLE LUNG NODULES ON CT: ICD-10-CM

## 2023-03-29 DIAGNOSIS — Z86.000 HISTORY OF DUCTAL CARCINOMA IN SITU (DCIS) OF BREAST: ICD-10-CM

## 2023-03-29 DIAGNOSIS — Z15.09 BIALLELIC MUTATION OF CHEK2 GENE: ICD-10-CM

## 2023-03-29 DIAGNOSIS — M19.90 INFLAMMATORY ARTHRITIS: ICD-10-CM

## 2023-03-29 DIAGNOSIS — Z15.01 BIALLELIC MUTATION OF CHEK2 GENE: ICD-10-CM

## 2023-03-29 DIAGNOSIS — H90.12 CONDUCTIVE HEARING LOSS OF LEFT EAR WITH UNRESTRICTED HEARING OF RIGHT EAR: ICD-10-CM

## 2023-03-29 DIAGNOSIS — Z78.0 POST-MENOPAUSE: ICD-10-CM

## 2023-03-29 DIAGNOSIS — I10 ESSENTIAL HYPERTENSION: ICD-10-CM

## 2023-03-29 PROBLEM — Z86.0100 HISTORY OF COLON POLYPS: Status: ACTIVE | Noted: 2020-07-14

## 2023-04-10 ENCOUNTER — HOSPITAL ENCOUNTER (OUTPATIENT)
Dept: CT IMAGING | Facility: HOSPITAL | Age: 68
Discharge: HOME | End: 2023-04-10
Attending: PHYSICIAN ASSISTANT
Payer: MEDICARE

## 2023-04-10 DIAGNOSIS — F17.210 NICOTINE DEPENDENCE, CIGARETTES, UNCOMPLICATED: ICD-10-CM

## 2023-04-10 DIAGNOSIS — Z12.2 SCREENING FOR LUNG CANCER: ICD-10-CM

## 2023-04-10 PROCEDURE — 71271 CT THORAX LUNG CANCER SCR C-: CPT | Performed by: PHYSICIAN ASSISTANT

## 2023-04-14 ENCOUNTER — HOSPITAL ENCOUNTER (OUTPATIENT)
Dept: BONE DENSITY | Age: 68
Discharge: HOME | End: 2023-04-14
Attending: PHYSICIAN ASSISTANT
Payer: MEDICARE

## 2023-04-14 DIAGNOSIS — M85.89 OSTEOPENIA OF MULTIPLE SITES: ICD-10-CM

## 2023-04-14 DIAGNOSIS — Z78.0 POST-MENOPAUSE: ICD-10-CM

## 2023-04-14 PROCEDURE — 77080 DXA BONE DENSITY AXIAL: CPT | Performed by: PHYSICIAN ASSISTANT

## 2023-04-17 ENCOUNTER — OFFICE VISIT (OUTPATIENT)
Dept: RHEUMATOLOGY | Facility: CLINIC | Age: 68
End: 2023-04-17
Payer: MEDICARE

## 2023-04-17 VITALS
BODY MASS INDEX: 22.6 KG/M2 | TEMPERATURE: 98 F | WEIGHT: 122.81 LBS | RESPIRATION RATE: 16 BRPM | HEART RATE: 85 BPM | HEIGHT: 62 IN | DIASTOLIC BLOOD PRESSURE: 70 MMHG | OXYGEN SATURATION: 98 % | SYSTOLIC BLOOD PRESSURE: 140 MMHG

## 2023-04-17 DIAGNOSIS — M10.9 GOUT, UNSPECIFIED CAUSE, UNSPECIFIED CHRONICITY, UNSPECIFIED SITE: Primary | ICD-10-CM

## 2023-04-17 DIAGNOSIS — M11.20 CHONDROCALCINOSIS: ICD-10-CM

## 2023-04-17 DIAGNOSIS — M11.20 CALCIUM PYROPHOSPHATE DEPOSITION DISEASE (CPPD): ICD-10-CM

## 2023-04-17 PROCEDURE — 99214 OFFICE O/P EST MOD 30 MIN: CPT | Performed by: INTERNAL MEDICINE

## 2023-04-17 RX ORDER — PREDNISONE 1 MG/1
TABLET ORAL
Qty: 28 TABLET | Refills: 0 | Status: SHIPPED | OUTPATIENT
Start: 2023-04-17 | End: 2023-05-08

## 2023-04-17 RX ORDER — COLCHICINE 0.6 MG/1
0.6 TABLET ORAL 2 TIMES DAILY
Qty: 180 TABLET | Refills: 1 | Status: SHIPPED | OUTPATIENT
Start: 2023-04-17

## 2023-04-17 NOTE — PATIENT INSTRUCTIONS
Start pred 10 mg x 1 week, then pred 5 mg daily x 2 weeks. Take with food. Tennis elbow:  -For golfer's/tennis elbow, get a counterforce brace and wear it everyday.   -Use Voltaren gel     -I sent the colchicine to the following pharmacy. [facility]. Phone: [phone]  [email or web address]    -Make appointment in mid-June 2023 for cortisone knee injection, only if you need it. If you are ok, go ahead and cancel this. Recheck monitoring blood work while on colchicine in late July, then see me again in early August 2023.

## 2023-04-20 ENCOUNTER — OFFICE VISIT (OUTPATIENT)
Dept: SURGERY | Facility: CLINIC | Age: 68
End: 2023-04-20
Payer: MEDICARE

## 2023-04-20 VITALS
OXYGEN SATURATION: 98 % | WEIGHT: 124.19 LBS | BODY MASS INDEX: 22.85 KG/M2 | TEMPERATURE: 98 F | RESPIRATION RATE: 16 BRPM | HEART RATE: 73 BPM | SYSTOLIC BLOOD PRESSURE: 124 MMHG | DIASTOLIC BLOOD PRESSURE: 59 MMHG | HEIGHT: 62 IN

## 2023-04-20 DIAGNOSIS — Z12.39 BREAST CANCER SCREENING, HIGH RISK PATIENT: ICD-10-CM

## 2023-04-20 DIAGNOSIS — Z15.02 CHEK2-RELATED BREAST CANCER (HCC): Primary | ICD-10-CM

## 2023-04-20 DIAGNOSIS — Z15.89 CHEK2-RELATED BREAST CANCER (HCC): Primary | ICD-10-CM

## 2023-04-20 DIAGNOSIS — C50.919 CHEK2-RELATED BREAST CANCER (HCC): Primary | ICD-10-CM

## 2023-04-20 DIAGNOSIS — Z12.31 SCREENING MAMMOGRAM FOR BREAST CANCER: ICD-10-CM

## 2023-04-20 DIAGNOSIS — Z15.09 CHEK2-RELATED BREAST CANCER (HCC): Primary | ICD-10-CM

## 2023-04-20 PROCEDURE — 99203 OFFICE O/P NEW LOW 30 MIN: CPT

## 2023-04-30 DIAGNOSIS — I10 ESSENTIAL HYPERTENSION: ICD-10-CM

## 2023-05-02 RX ORDER — IRBESARTAN 150 MG/1
TABLET ORAL
Qty: 90 TABLET | Refills: 1 | Status: SHIPPED | OUTPATIENT
Start: 2023-05-02

## 2023-06-12 ENCOUNTER — PROCEDURE (OUTPATIENT)
Dept: SURGERY | Facility: CLINIC | Age: 68
End: 2023-06-12

## 2023-06-12 VITALS — SYSTOLIC BLOOD PRESSURE: 131 MMHG | DIASTOLIC BLOOD PRESSURE: 79 MMHG

## 2023-06-12 DIAGNOSIS — N30.90 CYSTITIS: ICD-10-CM

## 2023-06-12 DIAGNOSIS — C67.2 MALIGNANT NEOPLASM OF LATERAL WALL OF URINARY BLADDER (HCC): Primary | ICD-10-CM

## 2023-06-12 DIAGNOSIS — R31.0 GROSS HEMATURIA: ICD-10-CM

## 2023-06-12 LAB
APPEARANCE: CLEAR
BILIRUBIN: NEGATIVE
GLUCOSE (URINE DIPSTICK): NEGATIVE MG/DL
KETONES (URINE DIPSTICK): NEGATIVE MG/DL
LEUKOCYTES: NEGATIVE
MULTISTIX LOT#: NORMAL NUMERIC
NITRITE, URINE: NEGATIVE
OCCULT BLOOD: NEGATIVE
PH, URINE: 7 (ref 4.5–8)
SPECIFIC GRAVITY: 1.01 (ref 1–1.03)
URINE-COLOR: YELLOW
UROBILINOGEN,SEMI-QN: 0.2 MG/DL (ref 0–1.9)

## 2023-06-12 RX ORDER — CIPROFLOXACIN 500 MG/1
500 TABLET, FILM COATED ORAL ONCE
Status: COMPLETED | OUTPATIENT
Start: 2023-06-12 | End: 2023-06-12

## 2023-06-12 RX ADMIN — CIPROFLOXACIN 500 MG: 500 TABLET, FILM COATED ORAL at 10:12:00

## 2023-06-13 LAB — NON GYNE INTERPRETATION: NEGATIVE

## 2023-06-16 ENCOUNTER — OFFICE VISIT (OUTPATIENT)
Dept: RHEUMATOLOGY | Facility: CLINIC | Age: 68
End: 2023-06-16
Payer: MEDICARE

## 2023-06-16 VITALS
HEART RATE: 85 BPM | HEIGHT: 62 IN | DIASTOLIC BLOOD PRESSURE: 78 MMHG | TEMPERATURE: 98 F | WEIGHT: 121 LBS | RESPIRATION RATE: 16 BRPM | BODY MASS INDEX: 22.26 KG/M2 | SYSTOLIC BLOOD PRESSURE: 118 MMHG | OXYGEN SATURATION: 96 %

## 2023-06-16 DIAGNOSIS — M11.20 CHONDROCALCINOSIS: ICD-10-CM

## 2023-06-16 DIAGNOSIS — M11.20 CALCIUM PYROPHOSPHATE DEPOSITION DISEASE (CPPD): ICD-10-CM

## 2023-06-16 DIAGNOSIS — M10.9 GOUT, UNSPECIFIED CAUSE, UNSPECIFIED CHRONICITY, UNSPECIFIED SITE: Primary | ICD-10-CM

## 2023-06-16 DIAGNOSIS — M25.461 EFFUSION OF RIGHT KNEE: ICD-10-CM

## 2023-06-16 RX ORDER — TRIAMCINOLONE ACETONIDE 40 MG/ML
40 INJECTION, SUSPENSION INTRA-ARTICULAR; INTRAMUSCULAR ONCE
Status: COMPLETED | OUTPATIENT
Start: 2023-06-16 | End: 2023-06-16

## 2023-06-16 RX ORDER — PREDNISONE 5 MG/1
TABLET ORAL
Qty: 32 TABLET | Refills: 1 | Status: SHIPPED | OUTPATIENT
Start: 2023-06-16 | End: 2023-06-26

## 2023-06-16 RX ADMIN — TRIAMCINOLONE ACETONIDE 40 MG: 40 INJECTION, SUSPENSION INTRA-ARTICULAR; INTRAMUSCULAR at 14:13:00

## 2023-06-16 NOTE — PATIENT INSTRUCTIONS
Get blood work comprehensive metabolic panel (CMP) and CK in Sept 2023.  See me after blood work in mid-September

## 2023-06-17 NOTE — PROGRESS NOTES
Procedure Note: right knee injection. The risks, benefits, and alternatives of the procedure were explained, and verbal consent was obtained. Area over right medial mid-patellar aspect of knee was prepped with chlorhexidine then 70% isopropyl alcohol stick swab x 3 and numbed with ethyl choloride spray. 25-gauge needle was inserted, and 40 mg of kenalog and 2 cc of lidocaine was injected. Patient tolerated procedure well without any immediate complications.     Timeout performed:  Performer: Mckay Juares MD  Witness: Enrique Radford MA    The following was verified  -Patient Identity  -Procedure  -Site and Side  -Agreement on Procedure to be done  -Availability of Implants, Special  Equipment or Special Requirements  -Correct Position

## 2023-08-07 PROBLEM — Z86.0101 HISTORY OF ADENOMATOUS POLYP OF COLON: Status: ACTIVE | Noted: 2020-07-14

## 2023-08-07 PROBLEM — K63.5 POLYP OF COLON: Status: ACTIVE | Noted: 2023-08-07

## 2023-08-07 PROBLEM — K57.30 COLON, DIVERTICULOSIS: Status: ACTIVE | Noted: 2020-07-14

## 2023-08-07 PROBLEM — Z86.010 HISTORY OF ADENOMATOUS POLYP OF COLON: Status: ACTIVE | Noted: 2020-07-14

## 2023-08-23 DIAGNOSIS — C50.919 CHEK2-RELATED BREAST CANCER: ICD-10-CM

## 2023-08-23 DIAGNOSIS — Z15.02 CHEK2-RELATED BREAST CANCER: ICD-10-CM

## 2023-08-23 DIAGNOSIS — Z15.89 CHEK2-RELATED BREAST CANCER: ICD-10-CM

## 2023-08-23 DIAGNOSIS — D05.12 DUCTAL CARCINOMA IN SITU OF LEFT BREAST: Primary | ICD-10-CM

## 2023-08-23 DIAGNOSIS — Z15.09 CHEK2-RELATED BREAST CANCER: ICD-10-CM

## 2023-08-30 ENCOUNTER — HOSPITAL ENCOUNTER (OUTPATIENT)
Dept: MRI IMAGING | Facility: HOSPITAL | Age: 68
Discharge: HOME | End: 2023-08-30
Payer: MEDICARE

## 2023-08-30 DIAGNOSIS — C50.919 CHEK2-RELATED BREAST CANCER: ICD-10-CM

## 2023-08-30 DIAGNOSIS — Z15.09 CHEK2-RELATED BREAST CANCER: ICD-10-CM

## 2023-08-30 DIAGNOSIS — Z15.02 CHEK2-RELATED BREAST CANCER: ICD-10-CM

## 2023-08-30 DIAGNOSIS — Z15.89 CHEK2-RELATED BREAST CANCER: ICD-10-CM

## 2023-08-30 DIAGNOSIS — D05.12 DUCTAL CARCINOMA IN SITU OF LEFT BREAST: ICD-10-CM

## 2023-08-30 PROCEDURE — 77049 MRI BREAST C-+ W/CAD BI: CPT

## 2023-08-30 PROCEDURE — A9575 INJ GADOTERATE MEGLUMI 0.1ML: HCPCS

## 2023-08-30 RX ORDER — GADOTERATE MEGLUMINE 376.9 MG/ML
15 INJECTION INTRAVENOUS
Status: COMPLETED | OUTPATIENT
Start: 2023-08-30 | End: 2023-08-30

## 2023-08-30 RX ADMIN — GADOTERATE MEGLUMINE 11 ML: 376.9 INJECTION INTRAVENOUS at 17:06:00

## 2023-09-22 ENCOUNTER — LAB ENCOUNTER (OUTPATIENT)
Dept: LAB | Age: 68
End: 2023-09-22
Attending: INTERNAL MEDICINE
Payer: MEDICARE

## 2023-09-22 DIAGNOSIS — M10.9 GOUT, UNSPECIFIED CAUSE, UNSPECIFIED CHRONICITY, UNSPECIFIED SITE: ICD-10-CM

## 2023-09-22 DIAGNOSIS — M11.20 CHONDROCALCINOSIS: ICD-10-CM

## 2023-09-22 DIAGNOSIS — M11.20 CALCIUM PYROPHOSPHATE DEPOSITION DISEASE (CPPD): ICD-10-CM

## 2023-09-22 LAB
ALBUMIN SERPL-MCNC: 3.3 G/DL (ref 3.4–5)
ALBUMIN/GLOB SERPL: 1 {RATIO} (ref 1–2)
ALP LIVER SERPL-CCNC: 111 U/L
ALT SERPL-CCNC: 22 U/L
ANION GAP SERPL CALC-SCNC: 2 MMOL/L (ref 0–18)
AST SERPL-CCNC: 16 U/L (ref 15–37)
BILIRUB SERPL-MCNC: 0.6 MG/DL (ref 0.1–2)
BUN BLD-MCNC: 12 MG/DL (ref 7–18)
CALCIUM BLD-MCNC: 9.5 MG/DL (ref 8.5–10.1)
CHLORIDE SERPL-SCNC: 104 MMOL/L (ref 98–112)
CK SERPL-CCNC: 34 U/L
CO2 SERPL-SCNC: 30 MMOL/L (ref 21–32)
CREAT BLD-MCNC: 0.62 MG/DL
EGFRCR SERPLBLD CKD-EPI 2021: 99 ML/MIN/1.73M2 (ref 60–?)
FASTING STATUS PATIENT QL REPORTED: YES
GLOBULIN PLAS-MCNC: 3.4 G/DL (ref 2.8–4.4)
GLUCOSE BLD-MCNC: 92 MG/DL (ref 70–99)
OSMOLALITY SERPL CALC.SUM OF ELEC: 281 MOSM/KG (ref 275–295)
POTASSIUM SERPL-SCNC: 5.1 MMOL/L (ref 3.5–5.1)
PROT SERPL-MCNC: 6.7 G/DL (ref 6.4–8.2)
SODIUM SERPL-SCNC: 136 MMOL/L (ref 136–145)

## 2023-09-22 PROCEDURE — 82550 ASSAY OF CK (CPK): CPT

## 2023-09-22 PROCEDURE — 80053 COMPREHEN METABOLIC PANEL: CPT

## 2023-09-28 ENCOUNTER — OFFICE VISIT (OUTPATIENT)
Dept: INTERNAL MEDICINE CLINIC | Facility: CLINIC | Age: 68
End: 2023-09-28
Payer: MEDICARE

## 2023-09-28 VITALS
RESPIRATION RATE: 16 BRPM | OXYGEN SATURATION: 95 % | SYSTOLIC BLOOD PRESSURE: 112 MMHG | HEIGHT: 62 IN | WEIGHT: 115 LBS | BODY MASS INDEX: 21.16 KG/M2 | HEART RATE: 70 BPM | DIASTOLIC BLOOD PRESSURE: 64 MMHG

## 2023-09-28 DIAGNOSIS — M19.90 INFLAMMATORY ARTHRITIS: ICD-10-CM

## 2023-09-28 DIAGNOSIS — F17.200 SMOKER: ICD-10-CM

## 2023-09-28 DIAGNOSIS — Z23 NEED FOR PNEUMOCOCCAL 20-VALENT CONJUGATE VACCINATION: ICD-10-CM

## 2023-09-28 DIAGNOSIS — Z86.010 HISTORY OF COLON POLYPS: ICD-10-CM

## 2023-09-28 DIAGNOSIS — Z85.51 HISTORY OF BLADDER CANCER: ICD-10-CM

## 2023-09-28 DIAGNOSIS — R91.8 MULTIPLE LUNG NODULES ON CT: ICD-10-CM

## 2023-09-28 DIAGNOSIS — I10 ESSENTIAL HYPERTENSION: Primary | ICD-10-CM

## 2023-09-28 PROBLEM — R19.5 ABNORMAL FECES: Status: RESOLVED | Noted: 2020-07-14 | Resolved: 2023-09-28

## 2023-09-28 PROCEDURE — G0009 ADMIN PNEUMOCOCCAL VACCINE: HCPCS | Performed by: PHYSICIAN ASSISTANT

## 2023-09-28 PROCEDURE — 90677 PCV20 VACCINE IM: CPT | Performed by: PHYSICIAN ASSISTANT

## 2023-09-28 PROCEDURE — 99214 OFFICE O/P EST MOD 30 MIN: CPT | Performed by: PHYSICIAN ASSISTANT

## 2023-09-30 ENCOUNTER — TELEPHONE (OUTPATIENT)
Dept: INTERNAL MEDICINE CLINIC | Facility: CLINIC | Age: 68
End: 2023-09-30

## 2023-09-30 NOTE — TELEPHONE ENCOUNTER
[facility]  / Jeff Hubertus of [facility] [place] [facility] requesting medical records.   Sent to scan stat for further processing

## 2023-10-06 ENCOUNTER — OFFICE VISIT (OUTPATIENT)
Dept: RHEUMATOLOGY | Facility: CLINIC | Age: 68
End: 2023-10-06
Payer: MEDICARE

## 2023-10-06 VITALS
DIASTOLIC BLOOD PRESSURE: 70 MMHG | BODY MASS INDEX: 21.35 KG/M2 | HEIGHT: 62 IN | HEART RATE: 54 BPM | RESPIRATION RATE: 16 BRPM | TEMPERATURE: 99 F | WEIGHT: 116 LBS | OXYGEN SATURATION: 100 % | SYSTOLIC BLOOD PRESSURE: 142 MMHG

## 2023-10-06 DIAGNOSIS — M11.20 CHONDROCALCINOSIS: ICD-10-CM

## 2023-10-06 DIAGNOSIS — M25.50 ARTHRALGIA, UNSPECIFIED JOINT: ICD-10-CM

## 2023-10-06 DIAGNOSIS — M06.00 SERONEGATIVE RHEUMATOID ARTHRITIS (HCC): Primary | ICD-10-CM

## 2023-10-06 DIAGNOSIS — M10.9 GOUT, UNSPECIFIED CAUSE, UNSPECIFIED CHRONICITY, UNSPECIFIED SITE: ICD-10-CM

## 2023-10-06 PROCEDURE — 99214 OFFICE O/P EST MOD 30 MIN: CPT | Performed by: INTERNAL MEDICINE

## 2023-10-06 RX ORDER — PREDNISONE 5 MG/1
TABLET ORAL
Qty: 32 TABLET | Refills: 0 | Status: SHIPPED | OUTPATIENT
Start: 2023-10-06 | End: 2023-10-16

## 2023-10-06 RX ORDER — FOLIC ACID 1 MG/1
3 TABLET ORAL DAILY
Qty: 270 TABLET | Refills: 3 | Status: SHIPPED | OUTPATIENT
Start: 2023-10-06

## 2023-10-06 RX ORDER — COLCHICINE 0.6 MG/1
0.6 TABLET ORAL 2 TIMES DAILY
Qty: 180 TABLET | Refills: 1 | Status: SHIPPED | OUTPATIENT
Start: 2023-10-06

## 2023-10-06 RX ORDER — METHOTREXATE 2.5 MG/1
TABLET ORAL
Qty: 50 TABLET | Refills: 0 | Status: SHIPPED | OUTPATIENT
Start: 2023-10-06 | End: 2023-12-08

## 2023-10-10 DIAGNOSIS — I10 ESSENTIAL HYPERTENSION: ICD-10-CM

## 2023-10-11 RX ORDER — IRBESARTAN 150 MG/1
TABLET ORAL
Qty: 90 TABLET | Refills: 0 | Status: SHIPPED | OUTPATIENT
Start: 2023-10-11

## 2023-10-11 NOTE — TELEPHONE ENCOUNTER
PASSED per protocol, refill sent.     Last PE--  3-9-2023-CB      Future Appointments   Date Time Provider Jennifer Arredondo   12/13/2023  9:00 AM Fito Ledezma MD [facility] 4   12/29/2023 11:40 AM [facility] 0[facility]   1/19/2024 10:30 AM Lester Nieto MD [facility] Luvenia Amen

## 2023-10-27 ENCOUNTER — IMMUNIZATION (OUTPATIENT)
Dept: FAMILY MEDICINE CLINIC | Facility: CLINIC | Age: 68
End: 2023-10-27

## 2023-11-03 ENCOUNTER — LAB ENCOUNTER (OUTPATIENT)
Dept: LAB | Age: 68
End: 2023-11-03
Attending: INTERNAL MEDICINE
Payer: MEDICARE

## 2023-11-03 DIAGNOSIS — M25.50 ARTHRALGIA, UNSPECIFIED JOINT: ICD-10-CM

## 2023-11-03 DIAGNOSIS — M11.20 CHONDROCALCINOSIS: ICD-10-CM

## 2023-11-03 DIAGNOSIS — M10.9 GOUT, UNSPECIFIED CAUSE, UNSPECIFIED CHRONICITY, UNSPECIFIED SITE: ICD-10-CM

## 2023-11-03 DIAGNOSIS — M06.00 SERONEGATIVE RHEUMATOID ARTHRITIS (HCC): ICD-10-CM

## 2023-11-03 LAB
ALBUMIN SERPL-MCNC: 3.5 G/DL (ref 3.4–5)
ALBUMIN/GLOB SERPL: 0.9 {RATIO} (ref 1–2)
ALP LIVER SERPL-CCNC: 103 U/L
ALT SERPL-CCNC: 32 U/L
ANION GAP SERPL CALC-SCNC: 4 MMOL/L (ref 0–18)
AST SERPL-CCNC: 16 U/L (ref 15–37)
BASOPHILS # BLD AUTO: 0.08 X10(3) UL (ref 0–0.2)
BASOPHILS NFR BLD AUTO: 1.1 %
BILIRUB SERPL-MCNC: 0.7 MG/DL (ref 0.1–2)
BUN BLD-MCNC: 10 MG/DL (ref 9–23)
CALCIUM BLD-MCNC: 9.6 MG/DL (ref 8.5–10.1)
CHLORIDE SERPL-SCNC: 105 MMOL/L (ref 98–112)
CO2 SERPL-SCNC: 29 MMOL/L (ref 21–32)
CREAT BLD-MCNC: 0.65 MG/DL
EGFRCR SERPLBLD CKD-EPI 2021: 98 ML/MIN/1.73M2 (ref 60–?)
EOSINOPHIL # BLD AUTO: 0.11 X10(3) UL (ref 0–0.7)
EOSINOPHIL NFR BLD AUTO: 1.4 %
ERYTHROCYTE [DISTWIDTH] IN BLOOD BY AUTOMATED COUNT: 14.6 %
FASTING STATUS PATIENT QL REPORTED: YES
GLOBULIN PLAS-MCNC: 3.8 G/DL (ref 2.8–4.4)
GLUCOSE BLD-MCNC: 83 MG/DL (ref 70–99)
HCT VFR BLD AUTO: 41.1 %
HGB BLD-MCNC: 13.5 G/DL
IMM GRANULOCYTES # BLD AUTO: 0.02 X10(3) UL (ref 0–1)
IMM GRANULOCYTES NFR BLD: 0.3 %
LYMPHOCYTES # BLD AUTO: 2.32 X10(3) UL (ref 1–4)
LYMPHOCYTES NFR BLD AUTO: 30.5 %
MCH RBC QN AUTO: 30.9 PG (ref 26–34)
MCHC RBC AUTO-ENTMCNC: 32.8 G/DL (ref 31–37)
MCV RBC AUTO: 94.1 FL
MONOCYTES # BLD AUTO: 0.67 X10(3) UL (ref 0.1–1)
MONOCYTES NFR BLD AUTO: 8.8 %
NEUTROPHILS # BLD AUTO: 4.41 X10 (3) UL (ref 1.5–7.7)
NEUTROPHILS # BLD AUTO: 4.41 X10(3) UL (ref 1.5–7.7)
NEUTROPHILS NFR BLD AUTO: 57.9 %
OSMOLALITY SERPL CALC.SUM OF ELEC: 284 MOSM/KG (ref 275–295)
PLATELET # BLD AUTO: 394 10(3)UL (ref 150–450)
POTASSIUM SERPL-SCNC: 4 MMOL/L (ref 3.5–5.1)
PROT SERPL-MCNC: 7.3 G/DL (ref 6.4–8.2)
RBC # BLD AUTO: 4.37 X10(6)UL
SODIUM SERPL-SCNC: 138 MMOL/L (ref 136–145)
WBC # BLD AUTO: 7.6 X10(3) UL (ref 4–11)

## 2023-11-03 PROCEDURE — 85025 COMPLETE CBC W/AUTO DIFF WBC: CPT

## 2023-11-03 PROCEDURE — 80053 COMPREHEN METABOLIC PANEL: CPT

## 2023-11-05 ENCOUNTER — TELEPHONE (OUTPATIENT)
Dept: RHEUMATOLOGY | Facility: CLINIC | Age: 68
End: 2023-11-05

## 2023-11-05 DIAGNOSIS — Z51.81 THERAPEUTIC DRUG MONITORING: ICD-10-CM

## 2023-11-05 DIAGNOSIS — M06.00 SERONEGATIVE RHEUMATOID ARTHRITIS (HCC): Primary | ICD-10-CM

## 2023-11-05 RX ORDER — METHOTREXATE 2.5 MG/1
15 TABLET ORAL WEEKLY
Qty: 78 TABLET | Refills: 0 | Status: SHIPPED | OUTPATIENT
Start: 2023-11-05 | End: 2024-02-04

## 2023-11-10 ENCOUNTER — TELEPHONE (OUTPATIENT)
Dept: INTERNAL MEDICINE CLINIC | Facility: CLINIC | Age: 68
End: 2023-11-10

## 2023-11-10 NOTE — TELEPHONE ENCOUNTER
BP was high at visit with Dr. Olmedo.  She either needs to come in for a BP check or if she has a BP cuff at home she can check at home and send/call in readings.  WIll need to report date/time and BP as well as HR.  Other alt would be to go to the [facility] location (drive thru) for a BP check.  She can then send/call in with that info.

## 2023-11-10 NOTE — TELEPHONE ENCOUNTER
Likely got message from care gap. Pt seen by CB 9/28/23, per OV note pt to return in 6 mos for AWV.     CB, ok to postpone care gap until 3/28/24 when due for next OV?   rolling walker

## 2023-11-10 NOTE — TELEPHONE ENCOUNTER
Pt got msg thru steven that her BP check is overdue-I could not see msg-does she need to see provider or just NV?

## 2023-11-13 NOTE — TELEPHONE ENCOUNTER
Pt calling back with BP reading of 121/80 that she took 10  min ago. Pt stated you need an order to go to the drive thru, they wouldn't take BP without order. Her spouse had cuff at home so she used his.

## 2023-11-13 NOTE — TELEPHONE ENCOUNTER
Called and spoke to pt. Pt said BP was high at OV because she was in 10/10 pain. She asked the nurse to retake it but they did not. Pt does not have BP cuff at home. Lives in [place], advised to go to [facility] thru to get BP checked just to confirm it is ok. Call with readings. Pt stated understanding and agreed to plan.     KORIN MERCHANT

## 2023-11-13 NOTE — TELEPHONE ENCOUNTER
Called and spoke to pt. HR was 74.     Pt asking if CB is ok with her getting shingrix, concerned because of methotrexate.     FYI CB on BP and HR. Recommend shingrix given inactive vaccine? Noted for future with drive thru clinic.

## 2023-11-13 NOTE — TELEPHONE ENCOUNTER
Please apologize to pt for me.  We were told that pt's could use drive through for BPs checks and were told that no order was needed.    Thank her for the BP/HR reading.  Looks good.     OK for her to get Shingrix.  Sometimes the Methotrexate is held for 1-2 weeks after getting the vaccine to enhance the pt's immune response to the vaccine.  Pt should check with her Rheum to see what they recommend for the Methotrexate after taking Shingrix (if and how long to hold).

## 2023-12-04 ENCOUNTER — OFFICE VISIT (OUTPATIENT)
Dept: FAMILY MEDICINE CLINIC | Facility: CLINIC | Age: 68
End: 2023-12-04
Payer: MEDICARE

## 2023-12-04 VITALS
DIASTOLIC BLOOD PRESSURE: 74 MMHG | HEIGHT: 62 IN | BODY MASS INDEX: 20.96 KG/M2 | TEMPERATURE: 98 F | RESPIRATION RATE: 18 BRPM | WEIGHT: 113.88 LBS | SYSTOLIC BLOOD PRESSURE: 134 MMHG | OXYGEN SATURATION: 98 % | HEART RATE: 78 BPM

## 2023-12-04 DIAGNOSIS — H66.001 NON-RECURRENT ACUTE SUPPURATIVE OTITIS MEDIA OF RIGHT EAR WITHOUT SPONTANEOUS RUPTURE OF TYMPANIC MEMBRANE: Primary | ICD-10-CM

## 2023-12-04 DIAGNOSIS — H60.502 ACUTE OTITIS EXTERNA OF LEFT EAR, UNSPECIFIED TYPE: ICD-10-CM

## 2023-12-04 PROCEDURE — 99213 OFFICE O/P EST LOW 20 MIN: CPT

## 2023-12-04 RX ORDER — AMOXICILLIN 875 MG/1
875 TABLET, COATED ORAL 2 TIMES DAILY
Qty: 20 TABLET | Refills: 0 | Status: SHIPPED | OUTPATIENT
Start: 2023-12-04 | End: 2023-12-14

## 2023-12-04 RX ORDER — OFLOXACIN 3 MG/ML
10 SOLUTION AURICULAR (OTIC) DAILY
Qty: 5 ML | Refills: 0 | Status: SHIPPED | OUTPATIENT
Start: 2023-12-04 | End: 2023-12-14

## 2023-12-04 NOTE — PROGRESS NOTES
HPI:     Sherri Longo is a 79year old female with a PMH of HTN, breast ca. Following for:  1. 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 Gross hematuria   3. Cystitis    PCP - Elfego MINAYA 6/12/2023    Presents for check-up, cysto, discuss next steps. She feels good. Appetite and energy are good. No frequency, urgency, hematuria, dysuria. UA is negative    Reported ~ 8 oz water, > 100 oz coffee, 8 oz tea with light yellow urine. Now 40 water, 40 coffee, tea with light yellow urine. UTI hx: none  Tobacco hx: > 40 pack years, 1 PPD  Kidney stone hx: none  Fam h/o  malignancy: sister kidney cancer (non-smoker)    Cysto today: s/p TUR. No abnormalities. Cystitis has improved. CT AP + IVC 2/22/23: bladder mass near right trigone (> 2 cm) with posterior bladder wall thickening    Have discussed that this puts the patient in the intermediate risk group for NMIBC. We reviewed the NCCN guidelines for follow-up for intermediate risk NMIBC, which includes:  - cysto with urine cytology at 3, 6, 12, 18, 24 mo, then annually up to 5 y from diagnosis, then as indicated  - baseline upper tract imaging, then as indicated    Recommend she continue to drink plenty of water to help with mild cystitis changes. Will plan for cytology today and cysto with urine cytology in 6 mo months with CTU prior. PROCEDURE NOTE    PROCEDURE PERFORMED: Flexible Cystoscopy    After informed consent and urinalysis was obtained, patient was placed in the modified lithotomy position and all pressure points were padded. She was prepped and draped in the usual sterile fashion using Betadine. A 16 Maltese flexible scope was passed through the urethra, and the bladder was entered and examined in its entirety.      Findings: as noted above    The patient tolerated the procedure well, suffered no complications, was able to void spontaneously after completion of the procedure in the office, and left the office in good condition. Shwetha-procedural antibiotics were given.  _____________________    Prior note:    Cysto today: large (~ 4 cm) tumor over the right ureteral orifice, papillary. Left ureteral orifice is normal, uninvolved.  The remainder of the bladder appears healthy and without abnormalities    HISTORY:  Past Medical History:   Diagnosis Date    Arthritis 7-2022    Gout-osteoporosis    Blood in urine 2/2023    Bladder Tumor    Cancer ([facility] [address]     Cancer - left breast    Dry eye 10/30/2014    Ductal carcinoma in situ of breast 12/2016    Essential hypertension     Exposure to medical diagnostic radiation 2016    left breast    Eye pressure     elevated eye pressure    Glaucoma 2015    Being treated for high pressure in eyes    Hearing impairment     hearing aide left ear    Hearing loss 2016    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    High blood pressure     Pain in joints 1-2023    Ankle and knee inflammation    Smoker 10/30/2014    Visual impairment     glasses    Wears glasses 1969      Past Surgical History:   Procedure Laterality Date    COLONOSCOPY      LUMPECTOMY LEFT Left 12/2016    DCIS    ZANE BIOPSY STEREO NODULE 1 SITE LEFT (CPT=19081)  11/2016    DCIS    NEEDLE BIOPSY RIGHT Right 11/2015    US guided bx - benign    RADIATION LEFT Left 2017    lt lump with rad      Family History   Problem Relation Age of Onset    DCIS Self     Breast Cancer Self 61    Breast Cancer Mother 28    Heart Attack Father     Breast Cancer Sister 52    Cancer Sister 61        Kidney    Arthritis Sister     Heart Attack Brother     Cancer Maternal Grandmother         unknown      Social History:   Social History     Socioeconomic History    Marital status:    Occupational History    Occupation:      Comment: part time   Tobacco Use    Smoking status: Every Day     Packs/day: 1.00     Years: 41.00     Additional pack years: 0.00     Total pack years: 41.00     Types: Cigarettes Smokeless tobacco: Never   Vaping Use    Vaping Use: Never used   Substance and Sexual Activity    Alcohol use: No    Drug use: No    Sexual activity: Never   Social History Narrative    Lives in [facility] with     Prefers treatment here in [place]        Medications (Active prior to today's visit):  Current Outpatient Medications   Medication Sig Dispense Refill    Irbesartan 150 MG Oral Tab TAKE 1 TABLET BY MOUTH DAILY 90 tablet 1    amoxicillin 875 MG Oral Tab Take 1 tablet (875 mg total) by mouth 2 (two) times daily for 10 days. 20 tablet 0    ofloxacin 0.3 % Otic Solution Place 10 drops into the left ear daily for 10 days. 5 mL 0    methotrexate 2.5 MG Oral Tab Take 6 tablets (15 mg total) by mouth once a week. 78 tablet 0    colchicine 0.6 MG Oral Tab Take 1 tablet (0.6 mg total) by mouth 2 (two) times daily. 390 tablet 1    folic acid 1 MG Oral Tab Take 3 tablets (3 mg total) by mouth daily. 270 tablet 3    acetaminophen 500 MG Oral Tab Take 1 tablet (500 mg total) by mouth every 6 (six) hours as needed for Pain. ibuprofen 200 MG Oral Tab Take 1 tablet (200 mg total) by mouth every 6 (six) hours as needed for Pain. Timolol Maleate 0.5 % Ophthalmic Solution       TRAVATAN Z 0.004 % Ophthalmic Solution       Multiple Vitamin (ONE-DAILY MULTI VITAMINS) Oral Tab Take 1 tablet by mouth daily. Cyanocobalamin (VITAMIN B 12 OR) Take by mouth. COD LIVER OIL OR Take by mouth. Allergies: Allergies   Allergen Reactions    Wellbutrin [Bupropion] OTHER (SEE COMMENTS)     Tremors/shakes         ROS:     A comprehensive 10 point review of systems was completed. Pertinent positives and negatives noted in the the HPI.     PHYSICAL EXAM:     GENERAL APPEARANCE: well, developed, well nourished, in no acute distress  NEUROLOGIC: nonfocal, alert and oriented  HEAD: normocephalic, atraumatic  EYES: sclera non-icteric  EARS: hearing intact  ORAL CAVITY: mucosa moist  NECK/THYROID: no obvious goiter or masses  LUNGS: nonlabored breathing  ABDOMEN: soft, no obvious masses or tenderness  SKIN: no obvious rashes    : as noted above     ASSESSMENT/PLAN:   Diagnoses and all orders for this visit:    Malignant neoplasm of lateral wall of urinary bladder (HCC)  -     URINALYSIS, AUTO, W/O SCOPE  -     ciprofloxacin (Cipro) tab 500 mg  -     CT UROGRAM(W+WO) W/3D(CPT=74178/26167); Future  -     CYSTOURETHROSCOPY    Gross hematuria    Cystitis    - as noted above. Thanks again for this consult.     Theodore Jessica MD, [address]  Urologist  [facility]  Office: [phone]

## 2023-12-08 ENCOUNTER — LAB ENCOUNTER (OUTPATIENT)
Dept: LAB | Age: 68
End: 2023-12-08
Attending: INTERNAL MEDICINE
Payer: MEDICARE

## 2023-12-08 DIAGNOSIS — M06.00 SERONEGATIVE RHEUMATOID ARTHRITIS (HCC): ICD-10-CM

## 2023-12-08 DIAGNOSIS — Z51.81 THERAPEUTIC DRUG MONITORING: ICD-10-CM

## 2023-12-08 LAB
ALBUMIN SERPL-MCNC: 3.2 G/DL (ref 3.4–5)
ALBUMIN/GLOB SERPL: 0.9 {RATIO} (ref 1–2)
ALP LIVER SERPL-CCNC: 100 U/L
ALT SERPL-CCNC: 21 U/L
ANION GAP SERPL CALC-SCNC: 1 MMOL/L (ref 0–18)
AST SERPL-CCNC: 16 U/L (ref 15–37)
BASOPHILS # BLD AUTO: 0.09 X10(3) UL (ref 0–0.2)
BASOPHILS NFR BLD AUTO: 1 %
BILIRUB SERPL-MCNC: 0.5 MG/DL (ref 0.1–2)
BUN BLD-MCNC: 9 MG/DL (ref 9–23)
CALCIUM BLD-MCNC: 9.5 MG/DL (ref 8.5–10.1)
CHLORIDE SERPL-SCNC: 104 MMOL/L (ref 98–112)
CO2 SERPL-SCNC: 31 MMOL/L (ref 21–32)
CREAT BLD-MCNC: 0.74 MG/DL
EGFRCR SERPLBLD CKD-EPI 2021: 89 ML/MIN/1.73M2 (ref 60–?)
EOSINOPHIL # BLD AUTO: 0.15 X10(3) UL (ref 0–0.7)
EOSINOPHIL NFR BLD AUTO: 1.6 %
ERYTHROCYTE [DISTWIDTH] IN BLOOD BY AUTOMATED COUNT: 14.6 %
FASTING STATUS PATIENT QL REPORTED: YES
GLOBULIN PLAS-MCNC: 3.6 G/DL (ref 2.8–4.4)
GLUCOSE BLD-MCNC: 94 MG/DL (ref 70–99)
HCT VFR BLD AUTO: 39.1 %
HGB BLD-MCNC: 12.8 G/DL
IMM GRANULOCYTES # BLD AUTO: 0.03 X10(3) UL (ref 0–1)
IMM GRANULOCYTES NFR BLD: 0.3 %
LYMPHOCYTES # BLD AUTO: 2.44 X10(3) UL (ref 1–4)
LYMPHOCYTES NFR BLD AUTO: 26.8 %
MCH RBC QN AUTO: 31.2 PG (ref 26–34)
MCHC RBC AUTO-ENTMCNC: 32.7 G/DL (ref 31–37)
MCV RBC AUTO: 95.4 FL
MONOCYTES # BLD AUTO: 0.91 X10(3) UL (ref 0.1–1)
MONOCYTES NFR BLD AUTO: 10 %
NEUTROPHILS # BLD AUTO: 5.48 X10 (3) UL (ref 1.5–7.7)
NEUTROPHILS # BLD AUTO: 5.48 X10(3) UL (ref 1.5–7.7)
NEUTROPHILS NFR BLD AUTO: 60.3 %
OSMOLALITY SERPL CALC.SUM OF ELEC: 280 MOSM/KG (ref 275–295)
PLATELET # BLD AUTO: 481 10(3)UL (ref 150–450)
POTASSIUM SERPL-SCNC: 4.8 MMOL/L (ref 3.5–5.1)
PROT SERPL-MCNC: 6.8 G/DL (ref 6.4–8.2)
RBC # BLD AUTO: 4.1 X10(6)UL
SODIUM SERPL-SCNC: 136 MMOL/L (ref 136–145)
WBC # BLD AUTO: 9.1 X10(3) UL (ref 4–11)

## 2023-12-08 PROCEDURE — 85025 COMPLETE CBC W/AUTO DIFF WBC: CPT

## 2023-12-08 PROCEDURE — 80053 COMPREHEN METABOLIC PANEL: CPT

## 2023-12-10 ENCOUNTER — TELEPHONE (OUTPATIENT)
Dept: RHEUMATOLOGY | Facility: CLINIC | Age: 68
End: 2023-12-10

## 2023-12-10 DIAGNOSIS — M06.00 SERONEGATIVE RHEUMATOID ARTHRITIS (HCC): Primary | ICD-10-CM

## 2023-12-13 ENCOUNTER — PROCEDURE (OUTPATIENT)
Dept: SURGERY | Facility: CLINIC | Age: 68
End: 2023-12-13
Payer: MEDICARE

## 2023-12-13 VITALS — DIASTOLIC BLOOD PRESSURE: 84 MMHG | SYSTOLIC BLOOD PRESSURE: 142 MMHG

## 2023-12-13 DIAGNOSIS — N30.90 CYSTITIS: ICD-10-CM

## 2023-12-13 DIAGNOSIS — R31.0 GROSS HEMATURIA: ICD-10-CM

## 2023-12-13 DIAGNOSIS — C67.2 MALIGNANT NEOPLASM OF LATERAL WALL OF URINARY BLADDER (HCC): Primary | ICD-10-CM

## 2023-12-13 DIAGNOSIS — I10 ESSENTIAL HYPERTENSION: ICD-10-CM

## 2023-12-13 LAB
APPEARANCE: CLEAR
BILIRUBIN: NEGATIVE
GLUCOSE (URINE DIPSTICK): NEGATIVE MG/DL
KETONES (URINE DIPSTICK): NEGATIVE MG/DL
LEUKOCYTES: NEGATIVE
MULTISTIX LOT#: NORMAL NUMERIC
NITRITE, URINE: NEGATIVE
OCCULT BLOOD: NEGATIVE
PH, URINE: 6.5 (ref 4.5–8)
PROTEIN (URINE DIPSTICK): NEGATIVE MG/DL
SPECIFIC GRAVITY: 1.01 (ref 1–1.03)
URINE-COLOR: YELLOW
UROBILINOGEN,SEMI-QN: 0.2 MG/DL (ref 0–1.9)

## 2023-12-13 PROCEDURE — 81003 URINALYSIS AUTO W/O SCOPE: CPT | Performed by: UROLOGY

## 2023-12-13 PROCEDURE — 99213 OFFICE O/P EST LOW 20 MIN: CPT | Performed by: UROLOGY

## 2023-12-13 PROCEDURE — 52000 CYSTOURETHROSCOPY: CPT | Performed by: UROLOGY

## 2023-12-13 RX ORDER — CIPROFLOXACIN 500 MG/1
500 TABLET, FILM COATED ORAL ONCE
Status: COMPLETED | OUTPATIENT
Start: 2023-12-13 | End: 2023-12-13

## 2023-12-13 RX ORDER — IRBESARTAN 150 MG/1
TABLET ORAL
Qty: 90 TABLET | Refills: 1 | Status: SHIPPED | OUTPATIENT
Start: 2023-12-13

## 2023-12-13 RX ADMIN — CIPROFLOXACIN 500 MG: 500 TABLET, FILM COATED ORAL at 08:50:00

## 2023-12-14 LAB — NON GYNE INTERPRETATION: NEGATIVE

## 2023-12-29 ENCOUNTER — HOSPITAL ENCOUNTER (OUTPATIENT)
Dept: MAMMOGRAPHY | Facility: HOSPITAL | Age: 68
Discharge: HOME | End: 2023-12-29
Payer: MEDICARE

## 2023-12-29 DIAGNOSIS — Z12.31 SCREENING MAMMOGRAM FOR BREAST CANCER: ICD-10-CM

## 2023-12-29 PROCEDURE — 77067 SCR MAMMO BI INCL CAD: CPT

## 2023-12-29 PROCEDURE — 77063 BREAST TOMOSYNTHESIS BI: CPT

## 2024-01-12 ENCOUNTER — OFFICE VISIT (OUTPATIENT)
Dept: FAMILY MEDICINE CLINIC | Facility: CLINIC | Age: 69
End: 2024-01-12
Payer: MEDICARE

## 2024-01-12 VITALS
SYSTOLIC BLOOD PRESSURE: 118 MMHG | DIASTOLIC BLOOD PRESSURE: 72 MMHG | RESPIRATION RATE: 20 BRPM | OXYGEN SATURATION: 98 % | HEIGHT: 62 IN | HEART RATE: 86 BPM | TEMPERATURE: 99 F | WEIGHT: 114.63 LBS | BODY MASS INDEX: 21.1 KG/M2

## 2024-01-12 DIAGNOSIS — J40 BRONCHITIS: ICD-10-CM

## 2024-01-12 DIAGNOSIS — J01.01 ACUTE RECURRENT MAXILLARY SINUSITIS: Primary | ICD-10-CM

## 2024-01-12 LAB
OPERATOR ID: NORMAL
POCT LOT NUMBER: NORMAL
RAPID SARS-COV-2 BY PCR: NOT DETECTED

## 2024-01-12 PROCEDURE — 94640 AIRWAY INHALATION TREATMENT: CPT | Performed by: PHYSICIAN ASSISTANT

## 2024-01-12 PROCEDURE — U0002 COVID-19 LAB TEST NON-CDC: HCPCS | Performed by: PHYSICIAN ASSISTANT

## 2024-01-12 PROCEDURE — 99214 OFFICE O/P EST MOD 30 MIN: CPT | Performed by: PHYSICIAN ASSISTANT

## 2024-01-12 RX ORDER — PREDNISONE 20 MG/1
20 TABLET ORAL 2 TIMES DAILY
Qty: 10 TABLET | Refills: 0 | Status: SHIPPED | OUTPATIENT
Start: 2024-01-12 | End: 2024-01-17

## 2024-01-12 RX ORDER — ALBUTEROL SULFATE 90 UG/1
2 AEROSOL, METERED RESPIRATORY (INHALATION) EVERY 6 HOURS PRN
Qty: 1 EACH | Refills: 0 | Status: SHIPPED | OUTPATIENT
Start: 2024-01-12 | End: 2024-02-11

## 2024-01-12 RX ORDER — LATANOPROST 50 UG/ML
SOLUTION/ DROPS OPHTHALMIC
COMMUNITY
Start: 2023-12-13

## 2024-01-12 RX ORDER — DOXYCYCLINE HYCLATE 100 MG
100 TABLET ORAL 2 TIMES DAILY
Qty: 20 TABLET | Refills: 0 | Status: SHIPPED | OUTPATIENT
Start: 2024-01-12 | End: 2024-01-22

## 2024-01-12 RX ORDER — BENZONATATE 200 MG/1
200 CAPSULE ORAL 3 TIMES DAILY PRN
Qty: 30 CAPSULE | Refills: 0 | Status: SHIPPED | OUTPATIENT
Start: 2024-01-12 | End: 2024-01-22

## 2024-01-12 RX ORDER — IPRATROPIUM BROMIDE AND ALBUTEROL SULFATE 2.5; .5 MG/3ML; MG/3ML
3 SOLUTION RESPIRATORY (INHALATION) ONCE
Status: COMPLETED | OUTPATIENT
Start: 2024-01-12 | End: 2024-01-12

## 2024-01-12 RX ADMIN — IPRATROPIUM BROMIDE AND ALBUTEROL SULFATE 3 ML: 2.5; .5 SOLUTION RESPIRATORY (INHALATION) at 11:28:00

## 2024-01-12 NOTE — PROGRESS NOTES
CHIEF COMPLAINT:     Chief Complaint   Patient presents with    Cough     Entered by patient         HPI:   Mónica Wu is a 68 year old female who presents for cough for 5 days. Dry harsh cough. Cough occurs in fits, SOB with coughing fits, none otherwise. Deep breaths trigger coughing fits. No chest pain. No fever. + nasal congestion for one month- seen and treated with amoxicillin for sinus/ear, but symptoms have never resolved. Continued nasal congestion and sinus pressure. No current ear pain or sore throat. No Gi issues. No known covid exposure. No testing. No hx of pneumonia. + smoker. Grandson with similar symptoms. No albuterol at home. Hx of pulmonary nodules. Taking Flonase, Zyrtec, cough medication         Current Outpatient Medications   Medication Sig Dispense Refill    latanoprost 0.005 % Ophthalmic Solution INSTILL 1 DROP INTO AFFECTED EYES DAILY AT BEDTIME      Doxycycline Hyclate 100 MG Oral Tab Take 1 tablet (100 mg total) by mouth 2 (two) times daily for 10 days. 20 tablet 0    albuterol 108 (90 Base) MCG/ACT Inhalation Aero Soln Inhale 2 puffs into the lungs every 6 (six) hours as needed for Wheezing. 1 each 0    predniSONE 20 MG Oral Tab Take 1 tablet (20 mg total) by mouth 2 (two) times daily for 5 days. 10 tablet 0    benzonatate 200 MG Oral Cap Take 1 capsule (200 mg total) by mouth 3 (three) times daily as needed for cough. 30 capsule 0    Irbesartan 150 MG Oral Tab TAKE 1 TABLET BY MOUTH DAILY 90 tablet 1    methotrexate 2.5 MG Oral Tab Take 6 tablets (15 mg total) by mouth once a week. 78 tablet 0    colchicine 0.6 MG Oral Tab Take 1 tablet (0.6 mg total) by mouth 2 (two) times daily. 180 tablet 1    folic acid 1 MG Oral Tab Take 3 tablets (3 mg total) by mouth daily. 270 tablet 3    acetaminophen 500 MG Oral Tab Take 1 tablet (500 mg total) by mouth every 6 (six) hours as needed for Pain.      ibuprofen 200 MG Oral Tab Take 1 tablet (200 mg total) by mouth every 6 (six) hours  as needed for Pain.      Timolol Maleate 0.5 % Ophthalmic Solution       Multiple Vitamin (ONE-DAILY MULTI VITAMINS) Oral Tab Take 1 tablet by mouth daily.      Cyanocobalamin (VITAMIN B 12 OR) Take by mouth.      COD LIVER OIL OR Take by mouth.      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    High blood pressure     Pain in joints 1-2023    Ankle and knee inflammation    Smoker 10/30/2014    Visual impairment     glasses    Wears glasses 1969      Social History:  Social History     Socioeconomic History    Marital status:    Occupational History    Occupation:      Comment: part time   Tobacco Use    Smoking status: Every Day     Packs/day: 1.00     Years: 41.00     Additional pack years: 0.00     Total pack years: 41.00     Types: Cigarettes    Smokeless tobacco: Never   Vaping Use    Vaping Use: Never used   Substance and Sexual Activity    Alcohol use: No    Drug use: No    Sexual activity: Never   Social History Narrative    Lives in [place] with     Prefers treatment here in [place]        REVIEW OF SYSTEMS:   GENERAL: No fever or chills.  SKIN: No rashes, or other skin lesions.   EYES: Denies blurred vision or double vision.  HENT: See HPI  CARDIOVASCULAR: Denies chest pain or palpitations  LUNGS: Per HPI.  GI: Denies N/V/C/D or abdominal pain.      EXAM:   /72 (BP Location: Right arm, Patient Position:  Sitting, Cuff Size: adult)   Pulse 86   Temp 99 °F (37.2 °C) (Oral)   Resp 20   Ht 5' 2\" (1.575 m)   Wt 114 lb 9.6 oz (52 kg)   SpO2 98%   BMI 20.96 kg/m²   GENERAL: well developed, well nourished,in no apparent distress  SKIN: no rashes, no suspicious lesions  EYES: Conjunctiva clear.  No scleral icterus.  HENT: Atraumatic, normocephalic.  TM's pearly gray, no bulging, no fluid b/l.  Nostrils patent, nasal mucosa pink and non-inflamed.  No erythema of the throat.No tonsillar enlargement or exudates   NECK: supple, non-tender.  LUNGS: Normal respiratory rate. Normal effort.  Shallow breaths. Dry cough. Mild exp wheezing, clears with breaths. Symptoms improved post neb tx- patient taking deeper breaths. No rales or crackles. . No decreased BS.   CARDIO: RRR without murmur  LYMPH: No cervical or supraclavicular lymphadenopathy.       ASSESSMENT AND PLAN:     Mónica Wu is a 68 year old female who presents with:     ASSESSMENT:  Encounter Diagnoses   Name Primary?    Acute recurrent maxillary sinusitis Yes    Bronchitis      Covid negative   Symptoms improved with neb tx. No acute respiratory distress or chest pain. Patient with similar symptoms in the past and does well with steroids   Discussed [facility] limitations related to cough: No imaging or labs available to fully rule out lung disease. Discussed close follow up for persistent or worsening cough. Monitor for fever, chest pain, or breathing issues- recheck with immediate care or the ED if symptoms occur       PLAN:  Meds as below.  Comfort Care as listed in Patient Instructions.      Meds & Refills for this Visit:  Requested Prescriptions     Signed Prescriptions Disp Refills    Doxycycline Hyclate 100 MG Oral Tab 20 tablet 0     Sig: Take 1 tablet (100 mg total) by mouth 2 (two) times daily for 10 days.    albuterol 108 (90 Base) MCG/ACT Inhalation Aero Soln 1 each 0     Sig: Inhale 2 puffs into the lungs every 6 (six) hours as needed for  Wheezing.    predniSONE 20 MG Oral Tab 10 tablet 0     Sig: Take 1 tablet (20 mg total) by mouth 2 (two) times daily for 5 days.    benzonatate 200 MG Oral Cap 30 capsule 0     Sig: Take 1 capsule (200 mg total) by mouth 3 (three) times daily as needed for cough.     Side effects, risks, benefits, of medication explained and discussed. Sun protection     The patient indicates understanding of these issues and agrees to the plan.    Patient Instructions   Start steroid. Take with food. No ibuprofen or aleve with medication   Start doxy ( antibiotic )   Albuterol inhaler every 4-6 hours as needed   Cough medication as needed   Continue allergy medications   Rest   Fluids   Smoking cessation encouraged   ED or immediate care for worsening cough, fever, or any chest pain or breathing issues   Close PCP follow up

## 2024-01-12 NOTE — PATIENT INSTRUCTIONS
Start steroid. Take with food. No ibuprofen or aleve with medication   Start doxy ( antibiotic )   Albuterol inhaler every 4-6 hours as needed   Cough medication as needed   Continue allergy medications   Rest   Fluids   Smoking cessation encouraged   ED or immediate care for worsening cough, fever, or any chest pain or breathing issues   Close PCP follow up

## 2024-01-16 ENCOUNTER — LAB ENCOUNTER (OUTPATIENT)
Dept: LAB | Age: 69
End: 2024-01-16
Attending: INTERNAL MEDICINE
Payer: MEDICARE

## 2024-01-16 DIAGNOSIS — M06.00 SERONEGATIVE RHEUMATOID ARTHRITIS (HCC): ICD-10-CM

## 2024-01-16 DIAGNOSIS — Z51.81 THERAPEUTIC DRUG MONITORING: ICD-10-CM

## 2024-01-16 LAB
ALBUMIN SERPL-MCNC: 3.4 G/DL (ref 3.4–5)
ALBUMIN/GLOB SERPL: 1 {RATIO} (ref 1–2)
ALP LIVER SERPL-CCNC: 90 U/L
ALT SERPL-CCNC: 37 U/L
ANION GAP SERPL CALC-SCNC: 0 MMOL/L (ref 0–18)
AST SERPL-CCNC: 14 U/L (ref 15–37)
BASOPHILS # BLD AUTO: 0 X10(3) UL (ref 0–0.2)
BASOPHILS NFR BLD AUTO: 0 %
BILIRUB SERPL-MCNC: 0.4 MG/DL (ref 0.1–2)
BUN BLD-MCNC: 13 MG/DL (ref 9–23)
CALCIUM BLD-MCNC: 9.5 MG/DL (ref 8.5–10.1)
CHLORIDE SERPL-SCNC: 105 MMOL/L (ref 98–112)
CO2 SERPL-SCNC: 30 MMOL/L (ref 21–32)
CREAT BLD-MCNC: 0.58 MG/DL
EGFRCR SERPLBLD CKD-EPI 2021: 100 ML/MIN/1.73M2 (ref 60–?)
EOSINOPHIL # BLD AUTO: 0 X10(3) UL (ref 0–0.7)
EOSINOPHIL NFR BLD AUTO: 0 %
ERYTHROCYTE [DISTWIDTH] IN BLOOD BY AUTOMATED COUNT: 14.5 %
FASTING STATUS PATIENT QL REPORTED: YES
GLOBULIN PLAS-MCNC: 3.5 G/DL (ref 2.8–4.4)
GLUCOSE BLD-MCNC: 79 MG/DL (ref 70–99)
HCT VFR BLD AUTO: 39.1 %
HGB BLD-MCNC: 12.7 G/DL
IMM GRANULOCYTES # BLD AUTO: 0.03 X10(3) UL (ref 0–1)
IMM GRANULOCYTES NFR BLD: 0.4 %
LYMPHOCYTES # BLD AUTO: 2.97 X10(3) UL (ref 1–4)
LYMPHOCYTES NFR BLD AUTO: 43 %
MCH RBC QN AUTO: 30.8 PG (ref 26–34)
MCHC RBC AUTO-ENTMCNC: 32.5 G/DL (ref 31–37)
MCV RBC AUTO: 94.7 FL
MONOCYTES # BLD AUTO: 0.79 X10(3) UL (ref 0.1–1)
MONOCYTES NFR BLD AUTO: 11.4 %
NEUTROPHILS # BLD AUTO: 3.11 X10 (3) UL (ref 1.5–7.7)
NEUTROPHILS # BLD AUTO: 3.11 X10(3) UL (ref 1.5–7.7)
NEUTROPHILS NFR BLD AUTO: 45.2 %
OSMOLALITY SERPL CALC.SUM OF ELEC: 279 MOSM/KG (ref 275–295)
PLATELET # BLD AUTO: 467 10(3)UL (ref 150–450)
POTASSIUM SERPL-SCNC: 4.5 MMOL/L (ref 3.5–5.1)
PROT SERPL-MCNC: 6.9 G/DL (ref 6.4–8.2)
RBC # BLD AUTO: 4.13 X10(6)UL
SODIUM SERPL-SCNC: 135 MMOL/L (ref 136–145)
WBC # BLD AUTO: 6.9 X10(3) UL (ref 4–11)

## 2024-01-16 PROCEDURE — 85025 COMPLETE CBC W/AUTO DIFF WBC: CPT

## 2024-01-16 PROCEDURE — 80053 COMPREHEN METABOLIC PANEL: CPT

## 2024-01-17 ENCOUNTER — HOSPITAL ENCOUNTER (OUTPATIENT)
Age: 69
Discharge: HOME | End: 2024-01-17
Payer: MEDICARE

## 2024-01-17 VITALS
OXYGEN SATURATION: 97 % | DIASTOLIC BLOOD PRESSURE: 80 MMHG | RESPIRATION RATE: 18 BRPM | HEART RATE: 83 BPM | SYSTOLIC BLOOD PRESSURE: 150 MMHG | HEIGHT: 62 IN | BODY MASS INDEX: 20.98 KG/M2 | TEMPERATURE: 97 F | WEIGHT: 114 LBS

## 2024-01-17 DIAGNOSIS — S61.211A LACERATION OF LEFT INDEX FINGER WITHOUT FOREIGN BODY WITHOUT DAMAGE TO NAIL, INITIAL ENCOUNTER: Primary | ICD-10-CM

## 2024-01-17 PROCEDURE — 12001 RPR S/N/AX/GEN/TRNK 2.5CM/<: CPT

## 2024-01-17 PROCEDURE — 99213 OFFICE O/P EST LOW 20 MIN: CPT

## 2024-01-17 PROCEDURE — 99203 OFFICE O/P NEW LOW 30 MIN: CPT

## 2024-01-17 NOTE — DISCHARGE INSTRUCTIONS
Clean and dry wound daily  Do not scrub site  Do not apply antibiotic ointment as this will break down the tissue adhesive  Tissue adhesive will come off on its own  Wear finger splint as much as possible to promote healing  Discuss with your primary care provider if you need a tetanus vaccination (you should get one every 10-years)

## 2024-01-17 NOTE — ED INITIAL ASSESSMENT (HPI)
Left index finger-  cut sustained when the knife slipped while trying to pry open bottom of salt/pepper shaker. Last TDAP unknown

## 2024-01-17 NOTE — ED PROVIDER NOTES
History     Chief Complaint   Patient presents with    Laceration       Subjective:   HPI    Mónica Mary Wu, 68 year old female with notable medical history of tobacco use who presents with Left index finger laceration. Patient reports accidentally cutting her Left index finger with a knife pta when trying to clean a salt shaker. Denies other injuries or complaints. Unknown tetanus status.        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    High blood pressure     Pain in joints 1-2023    Ankle and knee inflammation    Smoker 10/30/2014    Visual impairment     glasses    Wears glasses 1969              Past Surgical History:   Procedure Laterality Date    COLONOSCOPY      LUMPECTOMY LEFT Left 12/2016    DCIS    ZANE BIOPSY STEREO NODULE 1 SITE LEFT (CPT=19081)  11/2016    DCIS    NEEDLE BIOPSY RIGHT Right 11/2015    US guided bx - benign    RADIATION LEFT Left 2017    lt lump with rad                Social History     Socioeconomic History    Marital status:    Occupational History    Occupation:      Comment: part time   Tobacco Use    Smoking status: Every Day     Packs/day: 1.00     Years: 41.00     Additional pack years: 0.00     Total pack years: 41.00     Types: Cigarettes    Smokeless tobacco: Never   Vaping Use    Vaping Use: Never used   Substance and Sexual Activity    Alcohol use: No    Drug use: No    Sexual activity: Never   Social History Narrative    Lives in [place] with      Prefers treatment here in [place]              Review of Systems   Skin:         Finger laceration   All other systems reviewed and are negative.        Constitutional and vital signs reviewed.      All other systems reviewed and negative except as noted above.    I have reviewed the family history, social history, allergies, and outpatient medications.     History reviewed from EMR: Encounters, problem list, allergies, medications      Physical Exam     ED Triage Vitals [01/17/24 1422]   /80   Pulse 83   Resp 18   Temp 97.4 °F (36.3 °C)   Temp src Temporal   SpO2 97 %   O2 Device None (Room air)       Current:/80   Pulse 83   Temp 97.4 °F (36.3 °C) (Temporal)   Resp 18   Ht 157.5 cm (5' 2\")   Wt 51.7 kg   SpO2 97%   BMI 20.85 kg/m²       Physical Exam  Vitals and nursing note reviewed.   Constitutional:       General: She is not in acute distress.     Appearance: Normal appearance. She is normal weight. She is not ill-appearing or toxic-appearing.   HENT:      Head: Normocephalic and atraumatic.      Right Ear: External ear normal.      Left Ear: External ear normal.      Nose: Nose normal.      Mouth/Throat:      Mouth: Mucous membranes are moist.   Eyes:      Extraocular Movements: Extraocular movements intact.      Conjunctiva/sclera: Conjunctivae normal.      Pupils: Pupils are equal, round, and reactive to light.   Cardiovascular:      Rate and Rhythm: Normal rate.      Pulses: Normal pulses.   Pulmonary:      Effort: Pulmonary effort is normal. No respiratory distress.   Musculoskeletal:         General: No swelling, tenderness or signs of injury. Normal range of motion.      Left hand: Laceration present.      Cervical back: Normal range of motion and neck supple.      Comments: Approx 2cm laceration to proximal phalanx Left index finger. CMS intact   Skin:     General: Skin is warm and dry.      Capillary Refill: Capillary refill takes less than 2 seconds.      Coloration:  Skin is not jaundiced.   Neurological:      General: No focal deficit present.      Mental Status: She is alert and oriented to person, place, and time. Mental status is at baseline.   Psychiatric:         Mood and Affect: Mood normal.         Behavior: Behavior normal.         Thought Content: Thought content normal.         Judgment: Judgment normal.            ED Course     Labs Reviewed - No data to display  No orders to display       Vitals:    01/17/24 1422   BP: 150/80   Pulse: 83   Resp: 18   Temp: 97.4 °F (36.3 °C)   TempSrc: Temporal   SpO2: 97%   Weight: 51.7 kg   Height: 157.5 cm (5' 2\")            [facility]        Mónica Wu, 68 year old female with medical history as noted above who presents with finger laceration   - Patient in NAD, VSS   - Will need cleaning and closure   - See procedure documentation   - Wound care discussed   - f/u with primary care provider as needed        ** See ED course for additional information on care provided / interventions / notable events throughout patient's encounter.    ** See below for home care instructions (if applicable)    ED Course as of 01/17/24 1540  ------------------------------------------------------------  Time: 01/17 1539  Comment: Procedure - Wound Closure    UNIVERSAL PROTOCOL    - Verbal consent obtained by patient and/or guardian for wound closure.   - Procedure / Risks / Benefits/ Alternatives discussed, with questions answered to satisfaction.   - All questions answered to satisfaction prior to procedure initiation.    ANESTHESIA  Method (topical, local, nerve block): none    TREATMENT  Cleansed with: Saline and betadine  Amount of cleaning: standard  Location: Left index finger  Length: ~2cm  Repair method (sutures, staples, steri strips): tissue adhesive  # Sutures / staples / steri strips: n/a  Approximation (close, loose): close  Repair type (simple, complex): simple  Dressing: bandage placed  Procedure Completion: Tolerated well without  immediate complications    Finger splint provided to promote wound healing         I have independently reviewed the radiology images, clinical lab results, and ECG tracings as described above (if applicable)        Medical Decision Making  Risk  OTC drugs.        Disposition and Plan     Clinical Impression:  1. Laceration of left index finger without foreign body without damage to nail, initial encounter         Disposition:  Discharge  1/17/2024  3:03 pm    Follow-up:  Jennifer Lynn MD  [address] [place] [address]  [phone]            Medications Prescribed:  Discharge Medication List as of 1/17/2024  3:08 PM          The above patient (and/or guardian) was made aware that an appropriate evaluation has been performed, and that no additional testing is required at this time. In my medical judgment, there is currently no evidence of an immediate life-threatening or surgical condition, therefore discharge is indicated at this time. The patient (and/or guardian) was advised that a small risk still exists that a serious condition could develop. The patient was instructed to arrange close follow-up with their primary care provider (or the referral provider given today). The patient received written and verbal instructions regarding their condition / concerns, demonstrated understanding, and is agreement with the outpatient treatment plan.        Home care instructions:    Clean and dry wound daily  Do not scrub site  Do not apply antibiotic ointment as this will break down the tissue adhesive  Tissue adhesive will come off on its own  Wear finger splint as much as possible to promote healing  Discuss with your primary care provider if you need a tetanus vaccination (you should get one every 10-years)      Hola Fajardo, DNP, APRN, AGACNP-BC, FNP-C, CNL  Adult-Gerontology Acute Care & Family Nurse Practitioner  [facility]

## 2024-01-19 ENCOUNTER — OFFICE VISIT (OUTPATIENT)
Dept: RHEUMATOLOGY | Facility: CLINIC | Age: 69
End: 2024-01-19
Payer: MEDICARE

## 2024-01-19 VITALS
BODY MASS INDEX: 20.43 KG/M2 | WEIGHT: 111 LBS | HEART RATE: 76 BPM | TEMPERATURE: 98 F | RESPIRATION RATE: 16 BRPM | HEIGHT: 62 IN | DIASTOLIC BLOOD PRESSURE: 62 MMHG | SYSTOLIC BLOOD PRESSURE: 148 MMHG

## 2024-01-19 DIAGNOSIS — M10.9 GOUT, UNSPECIFIED CAUSE, UNSPECIFIED CHRONICITY, UNSPECIFIED SITE: ICD-10-CM

## 2024-01-19 DIAGNOSIS — Z51.81 THERAPEUTIC DRUG MONITORING: ICD-10-CM

## 2024-01-19 DIAGNOSIS — M11.20 CHONDROCALCINOSIS: ICD-10-CM

## 2024-01-19 DIAGNOSIS — M06.00 SERONEGATIVE RHEUMATOID ARTHRITIS (HCC): Primary | ICD-10-CM

## 2024-01-19 DIAGNOSIS — R11.0 NAUSEA: ICD-10-CM

## 2024-01-19 DIAGNOSIS — M25.50 ARTHRALGIA, UNSPECIFIED JOINT: ICD-10-CM

## 2024-01-19 PROCEDURE — 99214 OFFICE O/P EST MOD 30 MIN: CPT | Performed by: INTERNAL MEDICINE

## 2024-01-19 RX ORDER — METHOTREXATE 2.5 MG/1
15 TABLET ORAL WEEKLY
Qty: 78 TABLET | Refills: 0 | Status: SHIPPED | OUTPATIENT
Start: 2024-01-19 | End: 2024-04-19

## 2024-01-19 RX ORDER — METHYLDOPA/HYDROCHLOROTHIAZIDE 250MG-15MG
TABLET ORAL
COMMUNITY
Start: 2023-10-09

## 2024-01-19 RX ORDER — ONDANSETRON 4 MG/1
4 TABLET, FILM COATED ORAL DAILY PRN
Qty: 24 TABLET | Refills: 0 | Status: SHIPPED | OUTPATIENT
Start: 2024-01-19

## 2024-01-19 RX ORDER — ACETAMINOPHEN 160 MG
TABLET,DISINTEGRATING ORAL
COMMUNITY
Start: 2023-10-09

## 2024-01-19 RX ORDER — PREDNISONE 5 MG/1
TABLET ORAL
Qty: 32 TABLET | Refills: 0 | Status: SHIPPED | OUTPATIENT
Start: 2024-01-19 | End: 2024-01-29

## 2024-01-19 RX ORDER — FOLIC ACID 1 MG/1
3 TABLET ORAL DAILY
Qty: 270 TABLET | Refills: 3 | Status: SHIPPED | OUTPATIENT
Start: 2024-01-19

## 2024-01-19 NOTE — PROGRESS NOTES
Rheumatology f/u Patient Note  =====================================================================================================    Chief complaint: crystalline arthritis/seronegative rheumatoid arthritis    Chief Complaint   Patient presents with    Rheumatoid Arthritis     3 month f/u. Had been having knee and ankle pain, but got better with prednisone (taking for bronchitis). No other joint pain or swelling. Not really any stiffness in the morning.      Ortho:   Kai Bishop MD    PCP  Jennifer Lynn MD  Fax: [phone]  Phone: [phone]  =====================================================================================================  HPI   Date of visit: 3/20/2023    Mónica Wu is a 68 year old female   Here for further evaluation of inflammatory arthritis.  -PMHx: Non-invasive low grade papillary urothelial carcinoma, chronic smoker  -Patient reports chronic arthralgia in the ankles for over 20 years.  More recently she has had persistent right knee swelling for which she underwent evaluation by orthopedic surgery (Dr. Bishop).  30 cc of inflammatory fluid was aspirated from the right knee and December 2022.  Corticosteroid injection provided at the time led to 2 months of complete relief with partial recurrence of symptoms in the last couple months or so.  -She tried meloxicam 15 mg daily which helped with arthralgia significantly but she ended up with dyspepsia so she returned to her usual ibuprofen regimen.  -day pain is the worst  -dry eye: Previously using Restasis but cannot afford it given several $100 co-pay.  -sister and grandmother with RA.  -No family history of gout or any other autoimmune disease.  Medications:  Ibuprofen 200 mg every 2 hours, takes 8-12 each day.   ==============================================================================================================  Visit: 04/17/23  Colchicine with significant relief/improvement in swelling of her  joints.  July: Will be traveling to [facility] for a wedding  -Walking remains difficult.  Previously very active to 3 years ago with [employer], dance.  Is been essentially homebound for the last few years or so  Medications:  Colchicine 0.6 mg twice daily.  ==============================================================================================================  Visit: 06/16/23  Patient doing well.  Overall improved with colchicine.  Still with right knee pain/swelling but overall this has improved.  Going to [facility] in July for a wedding and wants to make sure her arthritis is in remission before that time.  Medications:  Colchicine 0.6 mg twice daily  ==============================================================================================================  Visit: 10/06/23  Patient doing better since last visit.  Right knee swelling, left ankle swelling persist.  Arthralgia has resolved.  Trip to [place] went very well.  Had to take prednisone on the day of the wedding that she attended.  Right knee corticosteroid injection at the last visit worked very well for 6 to 8 weeks.  Denies any fevers or any constitutional symptoms  -Continues on colchicine 0.6 mg twice daily.  No side effects  5 point ROS negative except noted above  I had reviewed past medical and family histories together with allergy and medication lists documented.  ==============================================================================================================  Today's Visit: 01/19/24    Patient was doing much better with methotrexate since the last visit.  Rheumatoid arthritis was quiescent until she obtained Shingrix vaccination, skip 1 dose of methotrexate as well.  Arthritis more active afterwards.  Chas and years with tough.  Took some prednisone around that time briefly.  Recent bronchitis, took prednisone 40 mg for this.  For 5 days.  Last dose was 2 days ago.  Joints are quite well today.  -Nausea and fatigue with taking  methotrexate Sunday morning    Medications/therapies:  Colchicine 0.6 mg twice daily   Methotrexate 15 mg weekly    5 point ROS negative except noted above  I had reviewed past medical and family histories together with allergy and medication lists documented.      Medications:  Current Outpatient Medications on File Prior to Visit   Medication Sig Dispense Refill    Calcium-Magnesium 500-250 MG Oral Tab       cholecalciferol 50 MCG (2000 UT) Oral Cap       Menatetrenone (VITAMIN K2) 100 MCG Oral Tab       latanoprost 0.005 % Ophthalmic Solution INSTILL 1 DROP INTO AFFECTED EYES DAILY AT BEDTIME      albuterol 108 (90 Base) MCG/ACT Inhalation Aero Soln Inhale 2 puffs into the lungs every 6 (six) hours as needed for Wheezing. 1 each 0    benzonatate 200 MG Oral Cap Take 1 capsule (200 mg total) by mouth 3 (three) times daily as needed for cough. 30 capsule 0    Irbesartan 150 MG Oral Tab TAKE 1 TABLET BY MOUTH DAILY 90 tablet 1    colchicine 0.6 MG Oral Tab Take 1 tablet (0.6 mg total) by mouth 2 (two) times daily. 180 tablet 1    acetaminophen 500 MG Oral Tab Take 1 tablet (500 mg total) by mouth every 6 (six) hours as needed for Pain.      ibuprofen 200 MG Oral Tab Take 1 tablet (200 mg total) by mouth every 6 (six) hours as needed for Pain.      Timolol Maleate 0.5 % Ophthalmic Solution       Multiple Vitamin (ONE-DAILY MULTI VITAMINS) Oral Tab Take 1 tablet by mouth daily.       No current facility-administered medications on file prior to visit.     ?  Allergies:  Allergies   Allergen Reactions    Wellbutrin [Bupropion] OTHER (SEE COMMENTS)     Tremors/shakes         Objective    Vitals:    01/19/24 1033   BP: 148/62   Pulse: 76   Resp: 16   Temp: 98.3 °F (36.8 °C)   Weight: 111 lb (50.3 kg)   Height: 5' 2\" (1.575 m)     GEN: NAD, well-nourished.   HEENT: Head: NCAT. Face: No lesions. Eyes: Conjunctiva clear. Sclera are anicteric. PERRLA. EOMs are full.   PULM:  easy effort  Extremities: No cyanosis, edema or  deformities.   Neurologic: Strength, CN2-12 grossly intact   Psych: normal affect.   Skin: No lesions or rashes.  MSK: 28 joint count performed. No evidence of synovitis in mcp, pip, dip, wrist, elbows, shoulders, hips, knees, ankles, mtp unless otherwise noted. Full ROM of elbows, wrists, knees.     Right knee with minimal effusion.  No tenderness.  OA changes  -Left ankle with mild effusion and mild tenderness  -No joint synovitis in the bilateral hands    Labs:  Lab Results   Component Value Date    WBC 6.9 01/16/2024    RBC 4.13 01/16/2024    HGB 12.7 01/16/2024    HCT 39.1 01/16/2024    .0 (H) 01/16/2024    MCV 94.7 01/16/2024    MCH 30.8 01/16/2024    MCHC 32.5 01/16/2024    RDW 14.5 01/16/2024    NEPRELIM 3.11 01/16/2024    NEPERCENT 45.2 01/16/2024    LYPERCENT 43.0 01/16/2024    MOPERCENT 11.4 01/16/2024    EOPERCENT 0.0 01/16/2024    BAPERCENT 0.0 01/16/2024    NE 3.11 01/16/2024    LYMABS 2.97 01/16/2024    MOABSO 0.79 01/16/2024    EOABSO 0.00 01/16/2024    BAABSO 0.00 01/16/2024     Lab Results   Component Value Date    GLU 79 01/16/2024    BUN 13 01/16/2024    BUNCREA 18.0 07/26/2021    CREATSERUM 0.58 01/16/2024    ANIONGAP 0 01/16/2024     11/15/2016    GFRNAA 101 01/26/2022    GFRAA 116 01/26/2022    CA 9.5 01/16/2024    OSMOCALC 279 01/16/2024    ALKPHO 90 01/16/2024    AST 14 (L) 01/16/2024    ALT 37 01/16/2024    BILT 0.4 01/16/2024    TP 6.9 01/16/2024    ALB 3.4 01/16/2024    GLOBULIN 3.5 01/16/2024     (L) 01/16/2024    K 4.5 01/16/2024     01/16/2024    CO2 30.0 01/16/2024 12/2022 Right knee synovial fluid   WBC 8930, percent neutrophils 94%, 2% lymphocytes.  No crystals.    2/2023  Creatinine 0.48, rest of CMP WNL  CBC W differential WNL    3/2023  Sed rate 44  CRP 0.87 mg/DL  Hepatitis B/C WNL  Quant gold WNL  Ferritin 61.4  Mag/Phos WNL  PTH 55.4  RF, CCP is negative  LILY is negative  Uric acid 4.4  TSH WNL  Additional Labs:    Radiology:    Radiology  review:      =====================================================================================================  Assessment and Plan    Assessment:  1. Seronegative rheumatoid arthritis (HCC)    2. Nausea    3. Therapeutic drug monitoring    4. Chondrocalcinosis    5. Arthralgia, unspecified joint    6. 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  Patient with chronic active small/medium/large joint inflammatory polyarthritis. Right knee synovial fluid aspirate in 12/2022 consistent with active inflammation.  Chondrocalcinosis in the left knee is noted on review of prior plain radiographs.   --Lack of crystals in synovial fluid does not rule out crystalline arthropathy especially since CPP crystals may be difficult to visualize.   --PIPs/DIPs, ankle effusions, right knee effusion had previously significantly improved with colchicine monotherapy.  --Still with residual moderate disease in the right knee and left ankle that has improved significantly from objective and subjective perspective with methotrexate monotherapy.    High risk medication labs including CMP and CBC w/ diff reviewed from 1/2024. Results are stable. HBsAg, HBcAB total negative, HCV neg, IGRA neg in 3/2023  -patient does not drink etoh    Plan:  -Continue colchicine 0.6 mg twice daily  -Continue methotrexate 15 mg weekly.  Take Saturday evening right before bed, this will help with nausea  -As needed Zofran for nausea due to methotrexate  -Continue folic acid 3 mg daily  -Repeat monitoring blood work in 2 months.  Obtain CK level given chronic colchicine    Rtc 3 months    Diagnoses and all orders for this visit:    Seronegative rheumatoid arthritis (HCC)  -     Comp Metabolic Panel (14); Future  -     CBC With Differential With Platelet; Future  -     CK (Creatine Kinase) (Not Creatinine); Future  -     methotrexate 2.5 MG Oral Tab; Take 6 tablets (15 mg total) by  mouth once a week.  -     folic acid 1 MG Oral Tab; Take 3 tablets (3 mg total) by mouth daily.  -     predniSONE 5 MG Oral Tab; Take 6 tablets (30 mg total) by mouth daily for 2 days, THEN 4 tablets (20 mg total) daily for 2 days, THEN 3 tablets (15 mg total) daily for 2 days, THEN 2 tablets (10 mg total) daily for 2 days, THEN 1 tablet (5 mg total) daily for 2 days.    Nausea  -     ondansetron (ZOFRAN) 4 mg tablet; Take 1 tablet (4 mg total) by mouth daily as needed for Nausea.  -     Comp Metabolic Panel (14); Future  -     CBC With Differential With Platelet; Future  -     CK (Creatine Kinase) (Not Creatinine); Future  -     predniSONE 5 MG Oral Tab; Take 6 tablets (30 mg total) by mouth daily for 2 days, THEN 4 tablets (20 mg total) daily for 2 days, THEN 3 tablets (15 mg total) daily for 2 days, THEN 2 tablets (10 mg total) daily for 2 days, THEN 1 tablet (5 mg total) daily for 2 days.    Therapeutic drug monitoring  -     methotrexate 2.5 MG Oral Tab; Take 6 tablets (15 mg total) by mouth once a week.  -     predniSONE 5 MG Oral Tab; Take 6 tablets (30 mg total) by mouth daily for 2 days, THEN 4 tablets (20 mg total) daily for 2 days, THEN 3 tablets (15 mg total) daily for 2 days, THEN 2 tablets (10 mg total) daily for 2 days, THEN 1 tablet (5 mg total) daily for 2 days.    Chondrocalcinosis  -     folic acid 1 MG Oral Tab; Take 3 tablets (3 mg total) by mouth daily.  -     predniSONE 5 MG Oral Tab; Take 6 tablets (30 mg total) by mouth daily for 2 days, THEN 4 tablets (20 mg total) daily for 2 days, THEN 3 tablets (15 mg total) daily for 2 days, THEN 2 tablets (10 mg total) daily for 2 days, THEN 1 tablet (5 mg total) daily for 2 days.    Arthralgia, unspecified joint  -     folic acid 1 MG Oral Tab; Take 3 tablets (3 mg total) by mouth daily.  -     predniSONE 5 MG Oral Tab; Take 6 tablets (30 mg total) by mouth daily for 2 days, THEN 4 tablets (20 mg total) daily for 2 days, THEN 3 tablets (15 mg total)  daily for 2 days, THEN 2 tablets (10 mg total) daily for 2 days, THEN 1 tablet (5 mg total) daily for 2 days.    Gout, unspecified cause, unspecified chronicity, unspecified site  -     folic acid 1 MG Oral Tab; Take 3 tablets (3 mg total) by mouth daily.  -     predniSONE 5 MG Oral Tab; Take 6 tablets (30 mg total) by mouth daily for 2 days, THEN 4 tablets (20 mg total) daily for 2 days, THEN 3 tablets (15 mg total) daily for 2 days, THEN 2 tablets (10 mg total) daily for 2 days, THEN 1 tablet (5 mg total) daily for 2 days.          Return in about 3 months (around 5/1/2024).      The above plan of care, diagnosis, orders, and follow-up were discussed with the patient. Questions related to this recommended plan of care were answered.    Thank you for referring this delightful patient to me. Please feel free to contact me with any questions.     This report was performed utilizing speech recognition software technology. Despite proofreading, speech recognition errors could escape detection. If a word or phrase is confusing or out of context, please do not hesitate to call for   clarification.       Kind regards      Isaias Olmedo MD  [facility]

## 2024-01-19 NOTE — PATIENT INSTRUCTIONS
Push back the shingles shot back to April 2024.     Switch methotrexate to 6 pills Saturday evening.  -continue folic acid 3 mg daily  -Take Zofran 4 mg as needed for nausea.

## 2024-03-08 ENCOUNTER — TELEPHONE (OUTPATIENT)
Dept: INTERNAL MEDICINE CLINIC | Facility: CLINIC | Age: 69
End: 2024-03-08

## 2024-03-08 DIAGNOSIS — I10 ESSENTIAL HYPERTENSION: ICD-10-CM

## 2024-03-08 DIAGNOSIS — Z00.00 ROUTINE GENERAL MEDICAL EXAMINATION AT A HEALTH CARE FACILITY: Primary | ICD-10-CM

## 2024-03-08 NOTE — TELEPHONE ENCOUNTER
Pt is having labs done on the 13th     [facility]   Future Appointments   Date Time Provider Department Center   3/27/2024  3:00 PM Anya Santana PA-C [facility] [facility]    Informed must fast no call back required. Orders to    [facility]

## 2024-03-13 ENCOUNTER — LAB ENCOUNTER (OUTPATIENT)
Dept: LAB | Age: 69
End: 2024-03-13
Attending: PHYSICIAN ASSISTANT
Payer: MEDICARE

## 2024-03-13 DIAGNOSIS — R11.0 NAUSEA: ICD-10-CM

## 2024-03-13 DIAGNOSIS — Z00.00 ROUTINE GENERAL MEDICAL EXAMINATION AT A HEALTH CARE FACILITY: ICD-10-CM

## 2024-03-13 DIAGNOSIS — I10 ESSENTIAL HYPERTENSION: ICD-10-CM

## 2024-03-13 DIAGNOSIS — M06.00 SERONEGATIVE RHEUMATOID ARTHRITIS (HCC): ICD-10-CM

## 2024-03-13 LAB
ALBUMIN SERPL-MCNC: 3.3 G/DL (ref 3.4–5)
ALBUMIN/GLOB SERPL: 0.9 {RATIO} (ref 1–2)
ALP LIVER SERPL-CCNC: 102 U/L
ALT SERPL-CCNC: 22 U/L
ANION GAP SERPL CALC-SCNC: 4 MMOL/L (ref 0–18)
AST SERPL-CCNC: 19 U/L (ref 15–37)
BASOPHILS # BLD AUTO: 0.12 X10(3) UL (ref 0–0.2)
BASOPHILS NFR BLD AUTO: 1.5 %
BILIRUB SERPL-MCNC: 0.6 MG/DL (ref 0.1–2)
BUN BLD-MCNC: 9 MG/DL (ref 9–23)
CALCIUM BLD-MCNC: 9.7 MG/DL (ref 8.5–10.1)
CHLORIDE SERPL-SCNC: 105 MMOL/L (ref 98–112)
CHOLEST SERPL-MCNC: 151 MG/DL (ref ?–200)
CK SERPL-CCNC: 28 U/L
CO2 SERPL-SCNC: 28 MMOL/L (ref 21–32)
CREAT BLD-MCNC: 0.61 MG/DL
EGFRCR SERPLBLD CKD-EPI 2021: 99 ML/MIN/1.73M2 (ref 60–?)
EOSINOPHIL # BLD AUTO: 0.15 X10(3) UL (ref 0–0.7)
EOSINOPHIL NFR BLD AUTO: 1.9 %
ERYTHROCYTE [DISTWIDTH] IN BLOOD BY AUTOMATED COUNT: 14 %
FASTING PATIENT LIPID ANSWER: YES
FASTING STATUS PATIENT QL REPORTED: YES
GLOBULIN PLAS-MCNC: 3.6 G/DL (ref 2.8–4.4)
GLUCOSE BLD-MCNC: 91 MG/DL (ref 70–99)
HCT VFR BLD AUTO: 38.1 %
HDLC SERPL-MCNC: 54 MG/DL (ref 40–59)
HGB BLD-MCNC: 12.6 G/DL
IMM GRANULOCYTES # BLD AUTO: 0.03 X10(3) UL (ref 0–1)
IMM GRANULOCYTES NFR BLD: 0.4 %
LDLC SERPL CALC-MCNC: 81 MG/DL (ref ?–100)
LYMPHOCYTES # BLD AUTO: 2.45 X10(3) UL (ref 1–4)
LYMPHOCYTES NFR BLD AUTO: 31.2 %
MCH RBC QN AUTO: 31.9 PG (ref 26–34)
MCHC RBC AUTO-ENTMCNC: 33.1 G/DL (ref 31–37)
MCV RBC AUTO: 96.5 FL
MONOCYTES # BLD AUTO: 0.78 X10(3) UL (ref 0.1–1)
MONOCYTES NFR BLD AUTO: 9.9 %
NEUTROPHILS # BLD AUTO: 4.33 X10 (3) UL (ref 1.5–7.7)
NEUTROPHILS # BLD AUTO: 4.33 X10(3) UL (ref 1.5–7.7)
NEUTROPHILS NFR BLD AUTO: 55.1 %
NONHDLC SERPL-MCNC: 97 MG/DL (ref ?–130)
OSMOLALITY SERPL CALC.SUM OF ELEC: 282 MOSM/KG (ref 275–295)
PLATELET # BLD AUTO: 486 10(3)UL (ref 150–450)
POTASSIUM SERPL-SCNC: 4.6 MMOL/L (ref 3.5–5.1)
PROT SERPL-MCNC: 6.9 G/DL (ref 6.4–8.2)
RBC # BLD AUTO: 3.95 X10(6)UL
SODIUM SERPL-SCNC: 137 MMOL/L (ref 136–145)
TRIGL SERPL-MCNC: 86 MG/DL (ref 30–149)
TSI SER-ACNC: 0.69 MIU/ML (ref 0.36–3.74)
VLDLC SERPL CALC-MCNC: 13 MG/DL (ref 0–30)
WBC # BLD AUTO: 7.9 X10(3) UL (ref 4–11)

## 2024-03-13 PROCEDURE — 82550 ASSAY OF CK (CPK): CPT

## 2024-03-13 PROCEDURE — 80053 COMPREHEN METABOLIC PANEL: CPT

## 2024-03-13 PROCEDURE — 85025 COMPLETE CBC W/AUTO DIFF WBC: CPT

## 2024-03-13 PROCEDURE — 84443 ASSAY THYROID STIM HORMONE: CPT

## 2024-03-13 PROCEDURE — 80061 LIPID PANEL: CPT

## 2024-03-26 PROBLEM — M06.00 SERONEGATIVE RHEUMATOID ARTHRITIS (HCC): Status: ACTIVE | Noted: 2024-03-26

## 2024-03-26 NOTE — PROGRESS NOTES
Subjective:   Mónica uW is a 68 year old female who presents for a Medicare Subsequent Annual Wellness visit (Pt already had Initial Annual Wellness) and scheduled follow up of multiple significant but stable problems.     Pt reports that she had a couple of ear infections and resp infections over the winter.  Overall improving but left with persistent post nasal drip.  Has been taking Zyrtec daily but not helping.  Has not tried any nasal sprays.      History/Other:   Fall Risk Assessment:   She has been screened for Falls and is low risk.      Cognitive Assessment:   She had a completely normal cognitive assessment - see flowsheet entries       Functional Ability/Status:   Mónica Wu has some abnormal functions as listed below:  She has Hearing problems based on screening of functional status.She has Vision problems based on screening of functional status.       Depression Screening (PHQ-2/PHQ-9): PHQ-2 SCORE: 0  , done 3/27/2024             Advanced Directives:   She does NOT have a Living Will. [ ]  She has a Power of  for Health Care on file in [employer].    Discussed Advance Care Planning with patient (and family/surrogate if present). Standard forms made available to patient in After Visit Summary.      Patient Active Problem List   Diagnosis    Smoker - not interested in quitting at this time    History of ductal carcinoma in situ (DCIS) of breast - follows with breast surgeon yearly, has upcoming appt due to this and CHEK2 gene mutation.      Essential hypertension - compliant with medication, no issues.    Conductive hearing loss - sees ENT at outside hospital.  Has hearing aid.    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  Pt has upcoming yearly appt.    History of adenomatous polyp of colon - no new issues.    Colon, diverticulosis - no new issues or acute  issues over the last year    Internal hemorrhoids - stable with no acute issues.    Hemorrhoids, external without complications - stable with no acute issues.    Multiple lung nodules on CT - Due in April for Screening lung CT.      Malignant neoplasm of lateral wall of urinary bladder (HCC) - no h/o bladder ca.  On active surveillance plan with Urology, compliant.    Seronegative rheumatoid arthritis (HCC) - now on Methotrexate and continues to follow with Rheum.  Still having significant pain and reports that Rheum wanted to increase her Methotrexate but she declined at last visit.  Will reconsider at May f/u appt.     Allergies:  She is allergic to wellbutrin [bupropion].    Current Medications:  Outpatient Medications Marked as Taking for the 3/27/24 encounter (Office Visit) with Anya Santana PA-C   Medication Sig    fluticasone propionate 50 MCG/ACT Nasal Suspension 2 sprays by Each Nare route daily.    Menatetrenone (VITAMIN K2) 100 MCG Oral Tab     ondansetron (ZOFRAN) 4 mg tablet Take 1 tablet (4 mg total) by mouth daily as needed for Nausea.    methotrexate 2.5 MG Oral Tab Take 6 tablets (15 mg total) by mouth once a week.    folic acid 1 MG Oral Tab Take 3 tablets (3 mg total) by mouth daily.    latanoprost 0.005 % Ophthalmic Solution INSTILL 1 DROP INTO AFFECTED EYES DAILY AT BEDTIME    Irbesartan 150 MG Oral Tab TAKE 1 TABLET BY MOUTH DAILY    colchicine 0.6 MG Oral Tab Take 1 tablet (0.6 mg total) by mouth 2 (two) times daily.    acetaminophen 500 MG Oral Tab Take 1 tablet (500 mg total) by mouth every 6 (six) hours as needed for Pain.    ibuprofen 200 MG Oral Tab Take 1 tablet (200 mg total) by mouth every 6 (six) hours as needed for Pain.    Timolol Maleate 0.5 % Ophthalmic Solution     Multiple Vitamin (ONE-DAILY MULTI VITAMINS) Oral Tab Take 1 tablet by mouth daily.       Medical History:  She  has a past medical history of Arthritis (7-2022), Blood in urine (2/2023), Cancer (HCC), Dry eye  (10/30/2014), Ductal carcinoma in situ of breast (12/2016), Essential hypertension, Exposure to medical diagnostic radiation (2016), Eye pressure, Glaucoma (2015), Hearing impairment, Hearing loss (2016), High blood pressure, Pain in joints (1-2023), Smoker (10/30/2014), Visual impairment, and Wears glasses (1969).  Surgical History:  She  has a past surgical history that includes lumpectomy left (Left, 12/2016); needle biopsy right (Right, 11/2015); radiation left (Left, 2017); sandra biopsy stereo nodule 1 site left (cpt=19081) (11/2016); and colonoscopy.   Family History:  Her family history includes Arthritis in her sister; Breast Cancer (age of onset: 35) in her mother; Breast Cancer (age of onset: 49) in her sister; Breast Cancer (age of onset: 61) in her self; Cancer in her maternal grandmother; Cancer (age of onset: 60) in her sister; DCIS in her self; Heart Attack in her brother and father.  Social History:  She  reports that she has been smoking cigarettes. She has a 41 pack-year smoking history. She has never used smokeless tobacco. She reports that she does not drink alcohol and does not use drugs.    Tobacco:  She currently smokes tobacco.  Social History    Tobacco Use      Smoking status: Every Day        Packs/day: 1.00        Years: 41.00        Additional pack years: 0.00        Total pack years: 41.00        Types: Cigarettes      Smokeless tobacco: Never     Tobacco Cessation Documentation (Smoking and Smokeless included):  I had an in depth therapy session with Mónica Hernandez Jazmin about her tobacco use risks and options using the USPSTF's Five A's approach:    Ask: Mónica is using tobacco products.  Assess: We asked about/assessed behavioral health risk and factors affecting choice of behavior change goals/methods.  Specifically I asked about readiness to quit tobacco.  Advise: We gave clear, specific, and personalized behavior change advice, including information about personal health harms and  benefits. Specifically, she was told that Quitting tobacco is one of the best things she can do for her health. I encouraged her to quit.      Agree: Mónica is not willing to change the behavior at this time.   Assist: Mónica declined assistance to help with tob cessation at this time.  Arrange: I will re-visit this with Mónica at her next visit as I have in the past.    Time Counseled: 3 minutes       CAGE Alcohol Screen:   CAGE screening score of 0 on 3/25/2024, showing low risk of alcohol abuse.      Patient Care Team:  Jennifer Lynn MD as PCP - General (Internal Medicine)  Francisco Vance MD (Radiation Oncology)  Irineo Bledsoe RN as Registered Nurse (Registered Nurse)  Kim Hernandez MD (Radiation Oncology)  Anya Santana PA-C (Physician Assistant)    Review of Systems  GENERAL: feels well otherwise  SKIN: denies any unusual skin lesions  EYES: denies blurred vision or double vision, +cataracts (seeing [facility])  HEENT: denies nasal congestion, sinus pain or ST  LUNGS: denies shortness of breath with exertion  CARDIOVASCULAR: denies chest pain on exertion  GI: denies abdominal pain, denies heartburn  : denies dysuria, vaginal discharge or itching, no complaint of urinary incontinence   MUSCULOSKELETAL: denies back pain  NEURO: denies headaches  PSYCHE: denies depression or anxiety  HEMATOLOGIC: denies hx of anemia  ENDOCRINE: denies thyroid history  ALL/ASTHMA: denies hx of allergy    Objective:   Physical Exam  Objective:   Physical Exam  General Appearance:  Alert, cooperative, no distress, appears stated age   Head:  Normocephalic, without obvious abnormality, atraumatic   Eyes:  PERRL, conjunctiva/corneas clear, EOM's intact both eyes   Ears:  Normal TM's and external ear canals, both ears   Nose: Nares normal, septum midline   Throat: Lips, mucosa, and tongue normal   Neck: Supple, symmetrical, trachea midline, no adenopathy;  no carotid bruit or JVD   Back:   Symmetric, no curvature    Lungs:   Clear to auscultation bilaterally, respirations unlabored   Heart:  Regular rate and rhythm, S1 and S2 normal, no murmur, rub, or gallop   Abdomen:   Soft, non-tender, bowel sounds active all four quadrants,  no masses, no organomegaly   Pelvic: Deferred   Extremities: Extremities normal, atraumatic, no cyanosis or edema   Pulses: 2+ and symmetric   Skin: Skin color, texture, turgor normal, no rashes or lesions   Lymph nodes: Cervical, supraclavicular nodes normal   Neurologic: Normal   Breasts:  pt declined, deferred to Breast surgeon visit next month.      /62   Pulse 78   Resp 18   Ht 5' 2\" (1.575 m)   Wt 111 lb (50.3 kg)   SpO2 96%   BMI 20.30 kg/m²  Estimated body mass index is 20.3 kg/m² as calculated from the following:    Height as of this encounter: 5' 2\" (1.575 m).    Weight as of this encounter: 111 lb (50.3 kg).    Medicare Hearing Assessment:   Hearing Screening    Entry User: Jackie Burch CMA  Screening Method: Finger Rub  Finger Rub Result: Pass               Assessment & Plan:   Mónica Wu is a 68 year old female who presents for a Medicare Assessment.     1. Encounter for annual health examination (Primary) - declined COVID booster.  Discussed and encouraged Shingrix #2, Tdap, RSV vaccines.  Pt will consider.    2. Essential hypertension - controlled on current meds, continue.  -     Expanded, Low Complexity (26408)  3. Colon, diverticulosis - stable will continue to monitor.  -     Expanded, Low Complexity (31027)  4. Internal hemorrhoids - stable will continue to monitor.  -     Expanded, Low Complexity (70622)  5. Hemorrhoids, external without complications - stable will continue to monitor.  -     Expanded, Low Complexity (62212)  6. History of adenomatous polyp of colon - colonoscopy UTD.  No acute issues.  Overview:  Adenoma (2020)  Orders:  -     Expanded, Low Complexity (37131)  7. Multiple lung nodules on CT - due for lung CT screening in April, order  provided and pt plans to do.  -     Expanded, Low Complexity (07794)  8. Smoker - not ready to quit, declines referrals/help with cessation.  -     Expanded, Low Complexity (75322)  9. History of ductal carcinoma in situ (DCIS) of breast - see above for details.  Follows annually with breast surgeon given hx and CHEK2 mutation.  -     Expanded, Low Complexity (05468)  10. 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  F/U soon with breast surgeon.  No new issues or complaints.  Overview:  The suspected deleterious CHEK2 gene mutation, c.444+1G>A, was upgraded to deleterious.  The variant of uncertain significance in CHRIS (c.4768C>T) was downgraded to likely benign  Next imaging due in Dec  Orders:  -     Expanded, Low Complexity (75982)  11. Seronegative rheumatoid arthritis (HCC) - on Methotrxate, compliant, still with pain, managed by Rheum and has f/u visit scheduled for May.  -     Expanded, Low Complexity (36234)  12. Conductive hearing loss of left ear with unrestricted hearing of right ear - follows with ENT, has hearing aid on L.  -     Expanded, Low Complexity (06517)  13. History of bladder cancer - on active surveillance plan with Urology.  Pt voiced understanding that her continue smoking increases her risk of recurrence.    -     Expanded, Low Complexity (77114)  14. Screening for lung cancer  -     CT LUNG LD SCREENING(CPT=71271); Future; Expected date: 04/10/2024  -     Expanded, Low Complexity (33318)  15. Cigarette nicotine dependence with nicotine-induced disorder  -     CT LUNG LD SCREENING(CPT=71271); Future; Expected date: 04/10/2024  -     Expanded, Low Complexity (15751)  16. Post-nasal drip - post infectious.  Try Flonase NS.    -     Expanded, Low Complexity (27877)  Other orders  -     Fluticasone Propionate; 2 sprays by Each Nare route daily.  Dispense: 16 g; Refill: 0    The patient indicates understanding of these issues and agrees to the plan.  Reinforced healthy  diet, lifestyle, and exercise.      Return in about 1 year (around 3/27/2025), or if symptoms worsen or fail to improve, for Annual wellness visit.     Anya Santana PA-C, 3/26/2024     Supplementary Documentation:   General Health:  In the past six months, have you lost more than 10 pounds without trying?: 2 - No  Has your appetite been poor?: No  Type of Diet: Balanced  How would you describe your daily physical activity?: Light  How would you describe your current health state?: Good  How do you maintain positive mental well-being?: Visiting Friends;Visiting Family  On a scale of 0 to 10, with 0 being no pain and 10 being severe pain, what is your pain level?: 3 - (Mild)  In the past six months, have you experienced urine leakage?: 0-No  At any time do you feel concerned for the safety/well-being of yourself and/or your children, in your home or elsewhere?: No  Have you had any immunizations at another office such as Influenza, Hepatitis B, Tetanus, or Pneumococcal?: Yes       Mónica Wu's SCREENING SCHEDULE   Tests on this list are recommended by your physician but may not be covered, or covered at this frequency, by your insurer.   Please check with your insurance carrier before scheduling to verify coverage.   PREVENTATIVE SERVICES FREQUENCY &  COVERAGE DETAILS LAST COMPLETION DATE   Diabetes Screening    Fasting Blood Sugar /  Glucose    One screening every 12 months if never tested or if previously tested but not diagnosed with pre-diabetes   One screening every 6 months if diagnosed with pre-diabetes Lab Results   Component Value Date    GLU 91 03/13/2024        Cardiovascular Disease Screening    Lipid Panel  Cholesterol  Lipoprotein (HDL)  Triglycerides Covered every 5 years for all Medicare beneficiaries without apparent signs or symptoms of cardiovascular disease Lab Results   Component Value Date    CHOLEST 151 03/13/2024    HDL 54 03/13/2024    LDL 81 03/13/2024    TRIG 86 03/13/2024          Electrocardiogram (EKG)   Covered if needed at Welcome to Medicare, and non-screening if indicated for medical reasons 03/02/2023      Ultrasound Screening for Abdominal Aortic Aneurysm (AAA) Covered once in a lifetime for one of the following risk factors    Men who are 65-75 years old and have ever smoked    Anyone with a family history -     Colorectal Cancer Screening  Covered for ages 50-85; only need ONE of the following:    Colonoscopy   Covered every 10 years    Covered every 2 years if patient is at high risk or previous colonoscopy was abnormal 08/07/2023    Health Maintenance   Topic Date Due    Colorectal Cancer Screening  08/07/2028       Flexible Sigmoidoscopy   Covered every 4 years -    Fecal Occult Blood Test Covered annually -   Bone Density Screening    Bone density screening    Covered every 2 years after age 65 if diagnosed with risk of osteoporosis or estrogen deficiency.    Covered yearly for long-term glucocorticoid medication use (Steroids) Last Dexa Scan:    XR DEXA BONE DENSITOMETRY (CPT=77080) 04/14/2023      No recommendations at this time   Pap and Pelvic    Pap   Covered every 2 years for women at normal risk; Annually if at high risk 01/27/2021  Health Maintenance   Topic Date Due    Pap Smear  01/27/2026       Chlamydia Annually if high risk -  No recommendations at this time   Screening Mammogram    Mammogram     Recommend annually for all female patients aged 40 and older    One baseline mammogram covered for patients aged 35-39 12/29/2023    Health Maintenance   Topic Date Due    Mammogram  12/29/2024       Immunizations    Influenza Covered once per flu season  Please get every year 10/27/2023  No recommendations at this time    Pneumococcal Each vaccine (Lodjgir62 & [id number]) covered once after 65 Prevnar 13: -    [id number]: 01/22/2020     No recommendations at this time    Hepatitis B One screening covered for patients with certain risk factors   -  No  recommendations at this time    Tetanus Toxoid Not covered by Medicare Part B unless medically necessary (cut with metal); may be covered with your pharmacy prescription benefits -    Tetanus, Diptheria and Pertusis TD and TDaP Not covered by Medicare Part B -  No recommendations at this time    Zoster Not covered by Medicare Part B; may be covered with your pharmacy  prescription benefits -  Zoster Vaccines(2 of 2) due on 01/20/2024     Annual Monitoring of Persistent Medications (ACE/ARB, digoxin diuretics, anticonvulsants)    Potassium Annually Lab Results   Component Value Date    K 4.6 03/13/2024         Creatinine   Annually Lab Results   Component Value Date    CREATSERUM 0.61 03/13/2024         BUN Annually Lab Results   Component Value Date    BUN 9 03/13/2024       Drug Serum Conc Annually No results found for: \"DIGOXIN\", \"DIG\", \"VALP\"

## 2024-03-27 ENCOUNTER — OFFICE VISIT (OUTPATIENT)
Dept: INTERNAL MEDICINE CLINIC | Facility: CLINIC | Age: 69
End: 2024-03-27
Payer: MEDICARE

## 2024-03-27 ENCOUNTER — PATIENT MESSAGE (OUTPATIENT)
Dept: INTERNAL MEDICINE CLINIC | Facility: CLINIC | Age: 69
End: 2024-03-27

## 2024-03-27 VITALS
HEART RATE: 78 BPM | BODY MASS INDEX: 20.43 KG/M2 | WEIGHT: 111 LBS | HEIGHT: 62 IN | SYSTOLIC BLOOD PRESSURE: 114 MMHG | OXYGEN SATURATION: 96 % | DIASTOLIC BLOOD PRESSURE: 62 MMHG | RESPIRATION RATE: 18 BRPM

## 2024-03-27 DIAGNOSIS — Z15.01 BIALLELIC MUTATION OF CHEK2 GENE: ICD-10-CM

## 2024-03-27 DIAGNOSIS — R09.82 POST-NASAL DRIP: ICD-10-CM

## 2024-03-27 DIAGNOSIS — K64.8 INTERNAL HEMORRHOIDS: ICD-10-CM

## 2024-03-27 DIAGNOSIS — Z12.2 SCREENING FOR LUNG CANCER: ICD-10-CM

## 2024-03-27 DIAGNOSIS — Z15.09 BIALLELIC MUTATION OF CHEK2 GENE: ICD-10-CM

## 2024-03-27 DIAGNOSIS — Z86.010 HISTORY OF ADENOMATOUS POLYP OF COLON: ICD-10-CM

## 2024-03-27 DIAGNOSIS — H90.12 CONDUCTIVE HEARING LOSS OF LEFT EAR WITH UNRESTRICTED HEARING OF RIGHT EAR: ICD-10-CM

## 2024-03-27 DIAGNOSIS — Z15.89 BIALLELIC MUTATION OF CHEK2 GENE: ICD-10-CM

## 2024-03-27 DIAGNOSIS — F17.200 SMOKER: ICD-10-CM

## 2024-03-27 DIAGNOSIS — K57.30 COLON, DIVERTICULOSIS: ICD-10-CM

## 2024-03-27 DIAGNOSIS — Z86.000 HISTORY OF DUCTAL CARCINOMA IN SITU (DCIS) OF BREAST: ICD-10-CM

## 2024-03-27 DIAGNOSIS — I10 ESSENTIAL HYPERTENSION: ICD-10-CM

## 2024-03-27 DIAGNOSIS — M06.00 SERONEGATIVE RHEUMATOID ARTHRITIS (HCC): ICD-10-CM

## 2024-03-27 DIAGNOSIS — Z85.51 HISTORY OF BLADDER CANCER: ICD-10-CM

## 2024-03-27 DIAGNOSIS — R91.8 MULTIPLE LUNG NODULES ON CT: ICD-10-CM

## 2024-03-27 DIAGNOSIS — F17.219 CIGARETTE NICOTINE DEPENDENCE WITH NICOTINE-INDUCED DISORDER: ICD-10-CM

## 2024-03-27 DIAGNOSIS — K64.4 HEMORRHOIDS, EXTERNAL WITHOUT COMPLICATIONS: ICD-10-CM

## 2024-03-27 DIAGNOSIS — Z00.00 ENCOUNTER FOR ANNUAL HEALTH EXAMINATION: Primary | ICD-10-CM

## 2024-03-27 PROBLEM — H90.2 CONDUCTIVE HEARING LOSS: Chronic | Status: ACTIVE | Noted: 2019-07-11

## 2024-03-27 PROBLEM — C67.2 MALIGNANT NEOPLASM OF LATERAL WALL OF URINARY BLADDER (HCC): Status: RESOLVED | Noted: 2023-03-29 | Resolved: 2024-03-27

## 2024-03-27 PROCEDURE — 99213 OFFICE O/P EST LOW 20 MIN: CPT | Performed by: PHYSICIAN ASSISTANT

## 2024-03-27 PROCEDURE — G0438 PPPS, INITIAL VISIT: HCPCS | Performed by: PHYSICIAN ASSISTANT

## 2024-03-27 RX ORDER — FLUTICASONE PROPIONATE 50 MCG
2 SPRAY, SUSPENSION (ML) NASAL DAILY
Qty: 16 G | Refills: 0 | Status: SHIPPED | OUTPATIENT
Start: 2024-03-27 | End: 2025-03-22

## 2024-03-27 NOTE — PATIENT INSTRUCTIONS
Mónica Wu's SCREENING SCHEDULE   Tests on this list are recommended by your physician but may not be covered, or covered at this frequency, by your insurer.   Please check with your insurance carrier before scheduling to verify coverage.   PREVENTATIVE SERVICES FREQUENCY &  COVERAGE DETAILS LAST COMPLETION DATE   Diabetes Screening    Fasting Blood Sugar /  Glucose    One screening every 12 months if never tested or if previously tested but not diagnosed with pre-diabetes   One screening every 6 months if diagnosed with pre-diabetes Lab Results   Component Value Date    GLU 91 03/13/2024        Cardiovascular Disease Screening    Lipid Panel  Cholesterol  Lipoprotein (HDL)  Triglycerides Covered every 5 years for all Medicare beneficiaries without apparent signs or symptoms of cardiovascular disease Lab Results   Component Value Date    CHOLEST 151 03/13/2024    HDL 54 03/13/2024    LDL 81 03/13/2024    TRIG 86 03/13/2024         Electrocardiogram (EKG)   Covered if needed at Welcome to Medicare, and non-screening if indicated for medical reasons 03/02/2023      Ultrasound Screening for Abdominal Aortic Aneurysm (AAA) Covered once in a lifetime for one of the following risk factors   • Men who are 65-75 years old and have ever smoked   • Anyone with a family history -     Colorectal Cancer Screening  Covered for ages 50-85; only need ONE of the following:    Colonoscopy   Covered every 10 years    Covered every 2 years if patient is at high risk or previous colonoscopy was abnormal 08/07/2023    Health Maintenance   Topic Date Due   • Colorectal Cancer Screening  08/07/2028       Flexible Sigmoidoscopy   Covered every 4 years -    Fecal Occult Blood Test Covered annually -   Bone Density Screening    Bone density screening    Covered every 2 years after age 65 if diagnosed with risk of osteoporosis or estrogen deficiency.    Covered yearly for long-term glucocorticoid medication use (Steroids) Last Dexa  Scan:    XR DEXA BONE DENSITOMETRY (CPT=77080) 04/14/2023      No recommendations at this time   Pap and Pelvic    Pap   Covered every 2 years for women at normal risk; Annually if at high risk 01/27/2021  Health Maintenance   Topic Date Due   • Pap Smear  01/27/2026       Chlamydia Annually if high risk -  No recommendations at this time   Screening Mammogram    Mammogram     Recommend annually for all female patients aged 40 and older    One baseline mammogram covered for patients aged 35-39 12/29/2023    Health Maintenance   Topic Date Due   • Mammogram  12/29/2024       Immunizations    Influenza Covered once per flu season  Please get every year 10/27/2023  No recommendations at this time    Pneumococcal Each vaccine (Vvifwrp31 & [id number]) covered once after 65 Prevnar 13: -    [id number]: 01/22/2020     No recommendations at this time    Hepatitis B One screening covered for patients with certain risk factors   -  No recommendations at this time    Tetanus Toxoid Not covered by Medicare Part B unless medically necessary (cut with metal); may be covered with your pharmacy prescription benefits -    Tetanus, Diptheria and Pertusis TD and TDaP Not covered by Medicare Part B -  No recommendations at this time    Zoster Not covered by Medicare Part B; may be covered with your pharmacy  prescription benefits -  Zoster Vaccines(2 of 2) due on 01/20/2024     Annual Monitoring of Persistent Medications (ACE/ARB, digoxin diuretics, anticonvulsants)    Potassium Annually Lab Results   Component Value Date    K 4.6 03/13/2024         Creatinine   Annually Lab Results   Component Value Date    CREATSERUM 0.61 03/13/2024         BUN Annually Lab Results   Component Value Date    BUN 9 03/13/2024       Drug Serum Conc Annually No results found for: \"DIGOXIN\", \"DIG\", \"VALP\"

## 2024-03-28 NOTE — TELEPHONE ENCOUNTER
From: Mónica Wu  To: Anya Santana  Sent: 3/27/2024 5:05 PM CDT  Subject: Nose spray    I noticed there is nothing about sending a prescription to my pharmacy for the nose drops we discussed. Will you please do so and if you did thank you. The pharmacy will be [facility] on [address] in [place].  Thanks-Mónica

## 2024-04-05 DIAGNOSIS — M11.20 CHONDROCALCINOSIS: ICD-10-CM

## 2024-04-05 DIAGNOSIS — M25.50 ARTHRALGIA, UNSPECIFIED JOINT: ICD-10-CM

## 2024-04-05 DIAGNOSIS — M06.00 SERONEGATIVE RHEUMATOID ARTHRITIS (HCC): ICD-10-CM

## 2024-04-05 DIAGNOSIS — M10.9 GOUT, UNSPECIFIED CAUSE, UNSPECIFIED CHRONICITY, UNSPECIFIED SITE: ICD-10-CM

## 2024-04-05 RX ORDER — COLCHICINE 0.6 MG/1
0.6 TABLET ORAL 2 TIMES DAILY
Qty: 180 TABLET | Refills: 0 | Status: SHIPPED | OUTPATIENT
Start: 2024-04-05

## 2024-04-05 NOTE — TELEPHONE ENCOUNTER
LOV: 1/19/2024    RTC: 3 months   Future Appointments   Date Time Provider Department Center   4/17/2024  4:30 PM [facility] CT MAIN [facility]4 [facility]   4/25/2024 10:00 AM Kayla Guerrero APRN [facility]   5/1/2024 10:30 AM Isaias Olmedo MD [facility]   6/3/2024 12:00 PM [facility] CT MAIN RM3 [facility]   6/12/2024  9:00 AM Dandre Rdz MD [id number]   LABS:   Component      Latest Ref Rng 3/13/2024   WBC      4.0 - 11.0 x10(3) uL 7.9    RBC      3.80 - 5.30 x10(6)uL 3.95    Hemoglobin      12.0 - 16.0 g/dL 12.6    Hematocrit      35.0 - 48.0 % 38.1    Platelet Count      150.0 - 450.0 10(3)uL 486.0 (H)    MCV      80.0 - 100.0 fL 96.5    MCH      26.0 - 34.0 pg 31.9    MCHC      31.0 - 37.0 g/dL 33.1    RDW      % 14.0    Prelim Neutrophil Abs      1.50 - 7.70 x10 (3) uL 4.33    Neutrophils Absolute      1.50 - 7.70 x10(3) uL 4.33    Lymphocytes Absolute      1.00 - 4.00 x10(3) uL 2.45    Monocytes Absolute      0.10 - 1.00 x10(3) uL 0.78    Eosinophils Absolute      0.00 - 0.70 x10(3) uL 0.15    Basophils Absolute      0.00 - 0.20 x10(3) uL 0.12    Immature Granulocyte Absolute      0.00 - 1.00 x10(3) uL 0.03    Neutrophils %      % 55.1    Lymphocytes %      % 31.2    Monocytes %      % 9.9    Eosinophils %      % 1.9    Basophils %      % 1.5    Immature Granulocyte %      % 0.4    Glucose      70 - 99 mg/dL 91    Sodium      136 - 145 mmol/L 137    Potassium      3.5 - 5.1 mmol/L 4.6    Chloride      98 - 112 mmol/L 105    Carbon Dioxide, Total      21.0 - 32.0 mmol/L 28.0    ANION GAP      0 - 18 mmol/L 4    BUN      9 - 23 mg/dL 9    CREATININE      0.55 - 1.02 mg/dL 0.61    CALCIUM      8.5 - 10.1 mg/dL 9.7    CALCULATED OSMOLALITY      275 - 295 mOsm/kg 282    EGFR      >=60 mL/min/1.73m2 99    AST (SGOT)      15 - 37 U/L 19    ALT (SGPT)      13 - 56 U/L 22    ALKALINE PHOSPHATASE      55 - 142 U/L 102    Total Bilirubin      0.1 - 2.0 mg/dL 0.6    PROTEIN, TOTAL      6.4 -  8.2 g/dL 6.9    Albumin      3.4 - 5.0 g/dL 3.3 (L)    Globulin      2.8 - 4.4 g/dL 3.6    A/G Ratio      1.0 - 2.0  0.9 (L)    Patient Fasting for CMP? Yes    CK      26 - 192 U/L 28       Legend:  (H) High  (L) Low    LAST REFILL: 10/6/2023, Quantity 180 tablets, Refills 1    On call provider-- orders pending, approve if agreeable.

## 2024-04-17 ENCOUNTER — HOSPITAL ENCOUNTER (OUTPATIENT)
Dept: CT IMAGING | Facility: HOSPITAL | Age: 69
Discharge: HOME | End: 2024-04-17
Attending: PHYSICIAN ASSISTANT
Payer: MEDICARE

## 2024-04-17 DIAGNOSIS — Z12.2 SCREENING FOR LUNG CANCER: ICD-10-CM

## 2024-04-17 DIAGNOSIS — F17.219 CIGARETTE NICOTINE DEPENDENCE WITH NICOTINE-INDUCED DISORDER: ICD-10-CM

## 2024-04-17 PROCEDURE — 71271 CT THORAX LUNG CANCER SCR C-: CPT | Performed by: PHYSICIAN ASSISTANT

## 2024-04-25 ENCOUNTER — OFFICE VISIT (OUTPATIENT)
Dept: SURGERY | Facility: CLINIC | Age: 69
End: 2024-04-25
Payer: MEDICARE

## 2024-04-25 VITALS
WEIGHT: 113 LBS | BODY MASS INDEX: 21 KG/M2 | TEMPERATURE: 98 F | OXYGEN SATURATION: 99 % | RESPIRATION RATE: 16 BRPM | DIASTOLIC BLOOD PRESSURE: 65 MMHG | SYSTOLIC BLOOD PRESSURE: 153 MMHG | HEART RATE: 73 BPM

## 2024-04-25 DIAGNOSIS — Z15.89 CHEK2-RELATED BREAST CANCER (HCC): Primary | ICD-10-CM

## 2024-04-25 DIAGNOSIS — Z12.39 BREAST CANCER SCREENING, HIGH RISK PATIENT: ICD-10-CM

## 2024-04-25 DIAGNOSIS — D05.12 INTRADUCTAL CARCINOMA IN SITU OF LEFT BREAST: ICD-10-CM

## 2024-04-25 DIAGNOSIS — C50.919 CHEK2-RELATED BREAST CANCER (HCC): Primary | ICD-10-CM

## 2024-04-25 DIAGNOSIS — Z15.09 CHEK2-RELATED BREAST CANCER (HCC): Primary | ICD-10-CM

## 2024-04-25 DIAGNOSIS — Z15.02 CHEK2-RELATED BREAST CANCER (HCC): Primary | ICD-10-CM

## 2024-04-25 DIAGNOSIS — Z12.31 SCREENING MAMMOGRAM FOR BREAST CANCER: ICD-10-CM

## 2024-04-25 PROCEDURE — 99214 OFFICE O/P EST MOD 30 MIN: CPT

## 2024-04-25 NOTE — PROGRESS NOTES
Breast Surgery Surveillance Visit    Diagnosis: DCIS, left breast; ER negative, AL negative status post Left lumpectomy on December 12, 2016    Stage:   Cancer Staging   History of ductal carcinoma in situ (DCIS) of breast  Staging form: Breast, AJCC V7  - Clinical stage from 11/10/2016: Stage Unknown (Tis (DCIS), NX, M0) - Signed by Barbra Curtis APRN on 7/11/2019  - Pathologic stage from 12/17/2016: Stage Unknown (Tis (DCIS), NX, cM0) - Signed by Barbra Curtis APRN on 7/11/2019    Disease Status:  Surgical treatment complete, Radiation therapy completed March 2017.     History of Present Illness:   Ms. Mónica Wu is a 68 year old woman who presented with a right imaging detected breast mass/calcifications in November 2015. She was referred for diagnostic imaging which is detailed below. She denies any palpable masses, nipple discharge, skin changes or axillary adenopathy. She does not have breast pain. She does not have significant past history for breast diseases or breast biopsy. She does have family history of breast cancer.     The patient has been compliant with annual screenings. She presented for Bilateral Screening Mammogram on October 26, 2015 which showed scattered fibroglandular densities (25-50% glandular) with no changes on the Left but with a focal asymmetry in the lateral right breast. She returned on November 6, 2015 for Right Diagnostic imaging that confirmed the asymmetry dissipates with compression but targeted ultrasound demonstrated a 5 mm hypoechoic possibly solid nodule at 9:00 for which ultrasound guided biopsy was recommended. Biopsy was performed and confirmed fibrocystic changes with no atypia or malignancy. As surveillance she underwent Right Diagnostic surveillance on May 19, 2016 which showed biopsy changes with no new concerns. Subsequent screening was performed in October 2016 which showed new calcifications and asymmetric density in the left breast requiring  additional imaging. The additional imaging confirmed a cluster of new pleomorphic calcifications at the 2:00 position of the left breast anterior depth for which stereotactic guided biopsy was recommended and performed. This confirmed DCIS, Grade 3, ER/NC negative. She underwent lumpectomy without complication and has had no new concerns related to the breast. She completed RT without complication.  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 She has no new concerns related to bilateral breasts on exam today. She is here today for evaluation and recommendations for further therapy.        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    She has no cumulative breastfeeding history.  She achieved menarche at age 15  Age of Menopause: 55  Type: Natural menopause  She has no history of hormone replacement therapy.  She has no history of oral contraceptive use.  She denies infertility treatment to achieve pregnancy.    Medications:    Current Outpatient Medications on File Prior to Visit    Medication Sig Dispense Refill    colchicine 0.6 MG Oral Tab Take 1 tablet (0.6 mg total) by mouth 2 (two) times daily. 180 tablet 0    fluticasone propionate 50 MCG/ACT Nasal Suspension 2 sprays by Each Nare route daily. 16 g 0    Menatetrenone (VITAMIN K2) 100 MCG Oral Tab       ondansetron (ZOFRAN) 4 mg tablet Take 1 tablet (4 mg total) by mouth daily as needed for Nausea. 24 tablet 0    folic acid 1 MG Oral Tab Take 3 tablets (3 mg total) by mouth daily. 270 tablet 3    latanoprost 0.005 % Ophthalmic Solution INSTILL 1 DROP INTO AFFECTED EYES DAILY AT BEDTIME      Irbesartan 150 MG Oral Tab TAKE 1 TABLET BY MOUTH DAILY 90 tablet 1    acetaminophen 500 MG Oral Tab Take 1 tablet (500 mg total) by mouth every 6 (six) hours as needed for Pain.      ibuprofen 200 MG Oral Tab Take 1 tablet (200 mg total) by mouth every 6 (six) hours as needed for Pain.      Timolol Maleate 0.5 % Ophthalmic Solution       Multiple Vitamin (ONE-DAILY MULTI VITAMINS) Oral Tab Take 1 tablet by mouth daily.       No current facility-administered medications on file prior to visit.       Allergies:    Allergies   Allergen Reactions    Wellbutrin [Bupropion] OTHER (SEE COMMENTS)     Tremors/shakes        Family History   Problem Relation Age of Onset    Breast Cancer Mother 35    Breast Cancer Sister 51-She tested BRCA negative    Cancer Maternal Grandmother      unknown   She does not know if she is of Ashkenazi Religion ancestry.    Social History:  History   Alcohol Use No     History   Smoking Status    Every Day    Packs/day: 1.00    Years: 41.00    Types: Cigarettes   Smokeless Tobacco    Never   The patient is . She has one child. She is employed part time in payroll.     Review of Systems:  General:   The patient denies, fever, chills, night sweats, fatigue, generalized weakness, change in appetite or weight loss.    HEENT:     The patient denies eye irritation, cataracts, redness, glaucoma, yellowing of the eyes, change in  vision or color blindness. The patient denies +hearing loss, ringing in the ears, ear drainage, earaches, nasal congestion, nose bleeds, snoring, pain in mouth/throat, hoarseness, change in voice, facial trauma.    Respiratory:  The patient denies chronic cough, phlegm, hemoptysis, pleurisy/chest pain, pneumonia, asthma, wheezing, difficulty in breathing with exertion, emphysema, chronic bronchitis, shortness of breast or abnormal sound when breathing.     Cardiovascular:  There is no history of chest pain, chest pressure/discomfort, palpitations, irregular heartbeat, fainting or near-fainting, difficulty breathing when lying flat, SOB/Coughing at night, swelling of the legs or chest pain while walking.    Breasts:  See history of present illness    Gastrointestinal:     There is no history of difficulty or pain with swallowing, reflux symptoms, vomiting, dark or bloody stools, constipation, yellowing of the skin, indigestion, nausea, change in bowel habits, diarrhea, abdominal pain or vomiting blood.     Genitourinary:  The patient denies frequent urination, needing to get up at night to urinate, urinary hesitancy or retaining urine, painful urination, urinary incontinence, decreased urine stream, blood in the urine or vaginal/penile discharge.    Skin:    The patient denies rash, itching, skin lesions, dry skin, change in skin color or change in moles.     Hematologic/Lymphatic:  The patient denies easily bruising or bleeding or persistent swollen glands or lymph nodes.     Musculoskeletal:  The patient denies muscle aches/pain, joint pain, stiff joints, neck pain, back pain or bone pain.    Neuropsychiatric:  There is no history of migraines or severe headaches, seizure/epilepsy, speech problems, coordination problems, trembling/tremors, fainting/black outs, dizziness, memory problems, loss of sensation/numbness, problems walking, weakness, tingling or burning in hands/feet. There is no history of abusive  relationship, bipolar disorder, sleep disturbance, anxiety, depression or feeling of despair.    Endocrine:    There is no history of poor/slow wound healing, weight loss/gain, fertility or hormone problems, cold intolerance, thyroid disease.     Allergic/Immunologic:  There is no history of hives, hay fever, angioedema or anaphylaxis.    /65 (BP Location: Right arm, Patient Position: Sitting, Cuff Size: adult)   Pulse 73   Temp 97.5 °F (36.4 °C) (Tympanic)   Resp 16   Wt 51.3 kg (113 lb)   SpO2 99%   BMI 20.67 kg/m²     Physical Examination:  This is a well-nourished, alert and oriented woman. There is not palpable cervical, supraclavicular or axillary adenopathy.  The neck is supple with a midline trachea and no thyromegaly. Range of motion is good at both shoulders. Breasts are symmetrical. The tumorectomy site is in the upper outer left  breast and shows no evidence of local recurrence. Both nipples are everted with no discharge. There is not dominant masses, focal nodularity or skin changes on either side. The abdomen is soft, flat and nontender, with no palpable masses or organomegaly.    Imaging:  Most recent imaging was a bilateral breast screening mammogram on 12/29/2023 which showed postlumpectomy changes in the left breast with no suspicious findings.  MRI breast on 8/30/2023 showed stable findings.  Impression:   Ms. Mónica Wu is a 68 year old woman presents s/p benign Right breast biopsy, and Left lumpectomy for Left breast DCIS with a family history of breast cancer and a confirmed CHEK2 deleterious gene mutation.    Discussion and Plan: I had a discussion with the Patient regarding her breast exam. On exam today, she is healing well with no evidence of new or recurrent disease. I personally reviewed her imaging which is unremarkable.  As she is ER/UT negative, she will not benefit from endocrine therapy.  We discussed the significance and implications of her genetic findings  and the need for high risk evaluation in this setting.  She will be due for high risk breast MRI in August/September 2024 and a bilateral mammogram 6 months following her breast MRI.  She will follow up in 1 year for a clinical exam. She was encouraged to contact the office with any questions or concerns prior to her next scheduled appointment.

## 2024-05-01 ENCOUNTER — OFFICE VISIT (OUTPATIENT)
Dept: RHEUMATOLOGY | Facility: CLINIC | Age: 69
End: 2024-05-01
Payer: MEDICARE

## 2024-05-01 ENCOUNTER — TELEPHONE (OUTPATIENT)
Dept: RHEUMATOLOGY | Facility: CLINIC | Age: 69
End: 2024-05-01

## 2024-05-01 VITALS
SYSTOLIC BLOOD PRESSURE: 152 MMHG | RESPIRATION RATE: 16 BRPM | HEART RATE: 66 BPM | DIASTOLIC BLOOD PRESSURE: 72 MMHG | WEIGHT: 110.38 LBS | BODY MASS INDEX: 20.31 KG/M2 | HEIGHT: 62 IN | TEMPERATURE: 98 F

## 2024-05-01 DIAGNOSIS — Z79.899 IMMUNODEFICIENCY DUE TO DRUG THERAPY (HCC): ICD-10-CM

## 2024-05-01 DIAGNOSIS — D84.821 IMMUNODEFICIENCY DUE TO DRUG THERAPY (HCC): ICD-10-CM

## 2024-05-01 DIAGNOSIS — M11.20 CHONDROCALCINOSIS: ICD-10-CM

## 2024-05-01 DIAGNOSIS — M06.00 SERONEGATIVE RHEUMATOID ARTHRITIS (HCC): Primary | ICD-10-CM

## 2024-05-01 DIAGNOSIS — M10.9 GOUT, UNSPECIFIED CAUSE, UNSPECIFIED CHRONICITY, UNSPECIFIED SITE: ICD-10-CM

## 2024-05-01 DIAGNOSIS — Z51.81 THERAPEUTIC DRUG MONITORING: ICD-10-CM

## 2024-05-01 DIAGNOSIS — M25.50 ARTHRALGIA, UNSPECIFIED JOINT: ICD-10-CM

## 2024-05-01 DIAGNOSIS — M81.0 AGE-RELATED OSTEOPOROSIS WITHOUT CURRENT PATHOLOGICAL FRACTURE: ICD-10-CM

## 2024-05-01 PROCEDURE — 99214 OFFICE O/P EST MOD 30 MIN: CPT | Performed by: INTERNAL MEDICINE

## 2024-05-01 RX ORDER — COLCHICINE 0.6 MG/1
0.6 TABLET ORAL 2 TIMES DAILY
Qty: 180 TABLET | Refills: 1 | Status: SHIPPED | OUTPATIENT
Start: 2024-05-01

## 2024-05-01 RX ORDER — FOLIC ACID 1 MG/1
3 TABLET ORAL DAILY
Qty: 270 TABLET | Refills: 3 | Status: SHIPPED | OUTPATIENT
Start: 2024-05-01 | End: 2024-07-30

## 2024-05-01 RX ORDER — METHOTREXATE 2.5 MG/1
2.5 TABLET ORAL WEEKLY
COMMUNITY
End: 2024-05-01

## 2024-05-01 RX ORDER — FOLIC ACID 1 MG/1
3 TABLET ORAL DAILY
Qty: 270 TABLET | Refills: 3 | Status: SHIPPED | OUTPATIENT
Start: 2024-05-01

## 2024-05-01 RX ORDER — METHOTREXATE 2.5 MG/1
25 TABLET ORAL WEEKLY
Qty: 130 TABLET | Refills: 0 | Status: SHIPPED | OUTPATIENT
Start: 2024-05-01 | End: 2024-07-31

## 2024-05-01 RX ORDER — METHOTREXATE 2.5 MG/1
25 TABLET ORAL WEEKLY
Qty: 130 TABLET | Refills: 0 | Status: SHIPPED | OUTPATIENT
Start: 2024-05-01 | End: 2024-05-01

## 2024-05-01 RX ORDER — FOLIC ACID 1 MG/1
3 TABLET ORAL DAILY
Qty: 270 TABLET | Refills: 3 | Status: SHIPPED | OUTPATIENT
Start: 2024-05-01 | End: 2024-05-01

## 2024-05-01 NOTE — PATIENT INSTRUCTIONS
Set up reclast on June 19th, one week after blood work on June 12th.      For bone health, especially when on chronic corticosteroids or if you have low bone density (and/or osteoporosis):   Goal daily intake is 1000 to 1200 mg of calcium  Goal daily intake is 2000 international units of vitamin D (though you may need more vitamin D)  3.   Here is a link to a guide that shows calcium content in select foods:   https://www.nof.org/patients/treatment/calciumvitamin-d/a-guide-to-calcium-rich-foods/    Methotrexate increase from 6 to 10 pills weekly  -Friday night: 5 pills  -Saturday night: 5 pills    -Continue colchicine 0.6 mg twice daily    Get RSV/flu together in fall 2024; skip one dose of methotrexate afterwards    Get TDAP    For osteoporosis:   -get Reclast or Prolia

## 2024-05-01 NOTE — PROGRESS NOTES
Rheumatology f/u Patient Note  =====================================================================================================    Chief complaint: crystalline arthritis/seronegative rheumatoid arthritis    Chief Complaint   Patient presents with    Follow - Up     LOV 1/19/2024. Pt states she is not doing too bad. She does have some rt knee pain 4/10 but states it is not as bad as it used to be. Rapid 3 score 5.0.     Ortho:   Kai Bishop MD    PCP  Jennifer Lynn MD  Fax: [phone]  Phone: [phone]  =====================================================================================================  HPI   Date of visit: 3/20/2023  Mónica Wu is a 68 year old female   Here for further evaluation of inflammatory arthritis.  -PMHx: Non-invasive low grade papillary urothelial carcinoma, chronic smoker  -Patient reports chronic arthralgia in the ankles for over 20 years.  More recently she has had persistent right knee swelling for which she underwent evaluation by orthopedic surgery (Dr. Bishop).  30 cc of inflammatory fluid was aspirated from the right knee and December 2022.  Corticosteroid injection provided at the time led to 2 months of complete relief with partial recurrence of symptoms in the last couple months or so.  -She tried meloxicam 15 mg daily which helped with arthralgia significantly but she ended up with dyspepsia so she returned to her usual ibuprofen regimen.  -day pain is the worst  -dry eye: Previously using Restasis but cannot afford it given several $100 co-pay.  -sister and grandmother with RA.  -No family history of gout or any other autoimmune disease.  Medications:  Ibuprofen 200 mg every 2 hours, takes 8-12 each day.   ==============================================================================================================  Visit: 10/06/23  Patient doing better since last visit.  Right knee swelling, left ankle swelling persist.  Arthralgia has resolved.   Trip to [place] went very well.  Had to take prednisone on the day of the wedding that she attended.  Right knee corticosteroid injection at the last visit worked very well for 6 to 8 weeks.  Denies any fevers or any constitutional symptoms  -Continues on colchicine 0.6 mg twice daily.  No side effects  5 point ROS negative except noted above  I had reviewed past medical and family histories together with allergy and medication lists documented.  ==============================================================================================================  Visit: 01/19/24  Patient was doing much better with methotrexate since the last visit.  Rheumatoid arthritis was quiescent until she obtained Shingrix vaccination, skip 1 dose of methotrexate as well.  Arthritis more active afterwards.  Hessmer and years with tough.  Took some prednisone around that time briefly.  Recent bronchitis, took prednisone 40 mg for this.  For 5 days.  Last dose was 2 days ago.  Joints are quite well today.  -Nausea and fatigue with taking methotrexate Sunday morning  Medications/therapies:  Colchicine 0.6 mg twice daily   Methotrexate 15 mg weekly  ==============================================================================================================  Today's Visit: 05/01/24    Doing well. R knee slightly bothersome. Feels like methotrexate is wearing off during the week.  Right ankle slightly bothersome.  No fractures.  Still smoking.    Medications/therapies:  Colchicine 0.6 mg twice daily   Methotrexate 15 mg weekly      5 point ROS negative except noted above  I had reviewed past medical and family histories together with allergy and medication lists documented.      Medications:  Current Outpatient Medications on File Prior to Visit   Medication Sig Dispense Refill    fluticasone propionate 50 MCG/ACT Nasal Suspension 2 sprays by Each Nare route daily. 16 g 0    latanoprost 0.005 % Ophthalmic Solution INSTILL 1 DROP INTO  AFFECTED EYES DAILY AT BEDTIME      Irbesartan 150 MG Oral Tab TAKE 1 TABLET BY MOUTH DAILY 90 tablet 1    acetaminophen 500 MG Oral Tab Take 1 tablet (500 mg total) by mouth every 6 (six) hours as needed for Pain.      ibuprofen 200 MG Oral Tab Take 1 tablet (200 mg total) by mouth every 6 (six) hours as needed for Pain.      Timolol Maleate 0.5 % Ophthalmic Solution       Multiple Vitamin (ONE-DAILY MULTI VITAMINS) Oral Tab Take 1 tablet by mouth daily.       No current facility-administered medications on file prior to visit.     ?  Allergies:  Allergies   Allergen Reactions    Wellbutrin [Bupropion] OTHER (SEE COMMENTS)     Tremors/shakes         Objective    Vitals:    05/01/24 1030   BP: 152/72   Pulse: 66   Resp: 16   Temp: 97.9 °F (36.6 °C)   Weight: 110 lb 6.4 oz (50.1 kg)   Height: 5' 2\" (1.575 m)       GEN: NAD, well-nourished.   HEENT: Head: NCAT. Face: No lesions. Eyes: Conjunctiva clear. Sclera are anicteric. PERRLA. EOMs are full.   PULM:   easy effort  Extremities: No cyanosis, edema or deformities.   Neurologic: Strength, CN2-12 grossly intact   Psych: normal affect.   Skin: No lesions or rashes.  MSK: 28 joint count performed. No evidence of synovitis in mcp, pip, dip, wrist, elbows, shoulders, hips, knees, ankles, mtp unless otherwise noted. Full ROM of elbows, wrists, knees.     PtGA: 5  SJ: 2 (right knee, right elbow)  TJ: 2  MDGA: 2.5    CDAI: 11.5  Right ankle effusion with tenderness        Labs:  Lab Results   Component Value Date    WBC 7.9 03/13/2024    RBC 3.95 03/13/2024    HGB 12.6 03/13/2024    HCT 38.1 03/13/2024    .0 (H) 03/13/2024    MCV 96.5 03/13/2024    MCH 31.9 03/13/2024    MCHC 33.1 03/13/2024    RDW 14.0 03/13/2024    NEPRELIM 4.33 03/13/2024    NEPERCENT 55.1 03/13/2024    LYPERCENT 31.2 03/13/2024    MOPERCENT 9.9 03/13/2024    EOPERCENT 1.9 03/13/2024    BAPERCENT 1.5 03/13/2024    NE 4.33 03/13/2024    LYMABS 2.45 03/13/2024    MOABSO 0.78 03/13/2024    EOABSO  0.15 03/13/2024    BAABSO 0.12 03/13/2024     Lab Results   Component Value Date    GLU 91 03/13/2024    BUN 9 03/13/2024    BUNCREA 18.0 07/26/2021    CREATSERUM 0.61 03/13/2024    ANIONGAP 4 03/13/2024     11/15/2016    GFRNAA 101 01/26/2022    GFRAA 116 01/26/2022    CA 9.7 03/13/2024    OSMOCALC 282 03/13/2024    ALKPHO 102 03/13/2024    AST 19 03/13/2024    ALT 22 03/13/2024    BILT 0.6 03/13/2024    TP 6.9 03/13/2024    ALB 3.3 (L) 03/13/2024    GLOBULIN 3.6 03/13/2024     03/13/2024    K 4.6 03/13/2024     03/13/2024    CO2 28.0 03/13/2024 12/2022 Right knee synovial fluid   WBC 8930, percent neutrophils 94%, 2% lymphocytes.  No crystals.    2/2023  Creatinine 0.48, rest of CMP WNL  CBC W differential WNL    3/2023  Sed rate 44  CRP 0.87 mg/DL  Hepatitis B/C WNL  Quant gold WNL  Ferritin 61.4  Mag/Phos WNL  PTH 55.4  RF, CCP is negative  LILY is negative  Uric acid 4.4  TSH WNL  Additional Labs:    Radiology:    2023 DEXA:   LUMBAR SPINE ANALYSIS RESULTS:      Bone mineral density (BMD) (g/cm2):  0.894    Lumbar T-Score:  -1.4      % young normals:  85      % age matched controls:  105      Change from prior spine examination:  0.4%               TOTAL HIP ANALYSIS RESULTS:        Bone mineral density (BMD) (g/cm2):  0.635      Total Hip T-Score:  -2.5      % young normals:  67      % age matched controls:  80      Change from prior hip examination:  -8.6*%               FEMORAL NECK ANALYSIS RESULTS:        Bone mineral density (BMD) (g/cm2):  0.498      Femoral neck T-Score:  -3.2      % young normals:  59      % age matched controls:  73      Change from prior hip examination:  -27.8*%           Radiology review:  CT LUNG LD SCREENING(CPT=71271)    Result Date: 4/18/2024  CONCLUSION:  1. Lung-RADS Category  1:  Negative.  No evidence of primary lung cancer.  2. Lung-RADS Category S:    Negative.  3. Other incidental findings:  None   RECOMMENDATIONS:  LUNG-RADS 1: Continued routine  annual LDCT lung screening.  Suggest next exam on or around April, 2025.    CT Lung Screening Reporting and Data System (Lung-RADS 1.1)    Lung Cancer Specific Category   ACR -Guidelines Based Follow-up   Category    Assessment                  Recommendation   0  Incomplete  Further imaging recommended    1  Negative  Routine annual LDCT screen (age <80)   2  Benign appearance or behavior  Routine annual LDCT screen (age <80)   3  Probably benign  Interval short-term diagnostic LDCT (6 months)   4a  Suspicious  Short-term follow-up LDCT or PET/CT (3 months)   4b  Suspicious  Multidisciplinary Conference Review   4x  Suspicious  Category 3 or 4 nodules with other suspicious features   S  Other potentially significant findings  Follow-up based on abnormality  LungRAD scores of 3, 4A, and 4B will be reviewed in the [facility]      LOCATION:  [facility]    Dictated by (CST): Akhil Young MD on 4/18/2024 at 12:48 PM     Finalized by (CST): Akhil Young MD on 4/18/2024 at 12:53 PM      =====================================================================================================  Assessment and Plan    Assessment:  1. Seronegative rheumatoid arthritis (HCC)    2. Chondrocalcinosis    3. Arthralgia, unspecified joint    4. Gout, unspecified cause, unspecified chronicity, unspecified site    5. Age-related osteoporosis without current pathological fracture    6. 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  Patient with chronic active small/medium/large joint inflammatory polyarthritis. Right knee synovial fluid aspirate in 12/2022 consistent with active inflammation.  Chondrocalcinosis in the left knee is noted on review of prior plain radiographs.   --Lack of crystals in synovial fluid does not rule out crystalline arthropathy especially since CPP crystals may be difficult to visualize.    --PIPs/DIPs, ankle effusions, right knee effusion had previously significantly improved with colchicine monotherapy.  --Today, CDAI is 11.5, indicating moderate disease activity.  Still with residual synovitis in the right knee, right ankle, right elbow      #Osteoporosis: No fracture history.  Risk factors include smoking, low calcium intake    High risk medication labs including CMP and CBC w/ diff reviewed from 3/2024. Results are stable. HBsAg, HBcAB total negative, HCV neg, IGRA neg in 3/2023  -patient does not drink etoh  -Sees dentist regularly.  Teeth are good.    Plan:  -Set up reclast on June 19th, one week after blood work on June 12th.    For bone health, especially when on chronic corticosteroids or if you have low bone density (and/or osteoporosis):   Goal daily intake is 1000 to 1200 mg of calcium  Goal daily intake is 2000 international units of vitamin D (though you may need more vitamin D)  3.   Here is a link to a guide that shows calcium content in select foods:   https://www.nof.org/patients/treatment/calciumvitamin-d/a-guide-to-calcium-rich-foods/    Methotrexate increase from 6 to 10 pills weekly  -Friday night: 5 pills  -Saturday night: 5 pills    -Continue colchicine 0.6 mg twice daily    Get RSV/flu together in fall 2024; skip one dose of methotrexate afterwards    Get TDAP    S/p April 10th Shngrix      Reclast can be associated with flulike symptoms.  -Schedule Reclast 1 week after neck set of blood work. On June 19th. Need to check vitamin D and other electrolytes.    Other rare side effects such as atypical femoral fractures (if taken for many years, the risk is very low and osteonecrosis of the jaw (which is rare and usually happens after tooth extraction or an infection of the jaw).    The last two side effects are unlikely as atypical femoral fractures are only after several years of the medication (and are still rare) and your teeth have been great.       Diagnoses and all orders  for this visit:    Seronegative rheumatoid arthritis (HCC)  -     colchicine 0.6 MG Oral Tab; Take 1 tablet (0.6 mg total) by mouth 2 (two) times daily.  -     methotrexate 2.5 MG Oral Tab; Take 10 tablets (25 mg total) by mouth once a week. 6 tablets weekly  -     folic acid 1 MG Oral Tab; Take 3 tablets (3 mg total) by mouth daily.  -     Comp Metabolic Panel (14); Future  -     CBC With Differential With Platelet; Future  -     PTH, Intact; Future  -     Phosphorus; Future  -     Magnesium; Future  -     Vitamin D; Future    Chondrocalcinosis  -     colchicine 0.6 MG Oral Tab; Take 1 tablet (0.6 mg total) by mouth 2 (two) times daily.  -     methotrexate 2.5 MG Oral Tab; Take 10 tablets (25 mg total) by mouth once a week. 6 tablets weekly  -     folic acid 1 MG Oral Tab; Take 3 tablets (3 mg total) by mouth daily.  -     Comp Metabolic Panel (14); Future  -     CBC With Differential With Platelet; Future  -     PTH, Intact; Future  -     Phosphorus; Future  -     Magnesium; Future  -     Vitamin D; Future    Arthralgia, unspecified joint  -     colchicine 0.6 MG Oral Tab; Take 1 tablet (0.6 mg total) by mouth 2 (two) times daily.  -     methotrexate 2.5 MG Oral Tab; Take 10 tablets (25 mg total) by mouth once a week. 6 tablets weekly  -     folic acid 1 MG Oral Tab; Take 3 tablets (3 mg total) by mouth daily.  -     Comp Metabolic Panel (14); Future  -     CBC With Differential With Platelet; Future    Gout, unspecified cause, unspecified chronicity, unspecified site  -     colchicine 0.6 MG Oral Tab; Take 1 tablet (0.6 mg total) by mouth 2 (two) times daily.  -     methotrexate 2.5 MG Oral Tab; Take 10 tablets (25 mg total) by mouth once a week. 6 tablets weekly  -     folic acid 1 MG Oral Tab; Take 3 tablets (3 mg total) by mouth daily.  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      The above plan of care, diagnosis, orders, and follow-up were discussed with the patient. Questions related to this recommended plan of care were answered.    Thank you for referring this delightful patient to me. Please feel free to contact me with any questions.     This report was performed utilizing speech recognition software technology. Despite proofreading, speech recognition errors could escape detection. If a word or phrase is confusing or out of context, please do not hesitate to call for   clarification.       Kind regards      Isaias Olmedo MD  [facility]

## 2024-05-02 NOTE — PROGRESS NOTES
No PA Reclast required. Order for Reclast at [facility] after 6/19/24 placed on Dr. Olmedo's desk for approval.

## 2024-05-03 PROBLEM — M81.0 SENILE OSTEOPOROSIS: Status: ACTIVE | Noted: 2024-05-03

## 2024-05-03 RX ORDER — ZOLEDRONIC ACID 5 MG/100ML
5 INJECTION, SOLUTION INTRAVENOUS ONCE
OUTPATIENT
Start: 2024-05-03

## 2024-06-03 ENCOUNTER — HOSPITAL ENCOUNTER (OUTPATIENT)
Dept: CT IMAGING | Facility: HOSPITAL | Age: 69
Discharge: HOME | End: 2024-06-03
Attending: UROLOGY
Payer: MEDICARE

## 2024-06-03 DIAGNOSIS — C67.2 MALIGNANT NEOPLASM OF LATERAL WALL OF URINARY BLADDER (HCC): ICD-10-CM

## 2024-06-03 LAB
CREAT BLD-MCNC: 0.5 MG/DL
EGFRCR SERPLBLD CKD-EPI 2021: 103 ML/MIN/1.73M2 (ref 60–?)

## 2024-06-03 PROCEDURE — 74178 CT ABD&PLV WO CNTR FLWD CNTR: CPT | Performed by: UROLOGY

## 2024-06-03 PROCEDURE — 76377 3D RENDER W/INTRP POSTPROCES: CPT | Performed by: UROLOGY

## 2024-06-03 PROCEDURE — 82565 ASSAY OF CREATININE: CPT

## 2024-06-03 NOTE — PROGRESS NOTES
HPI:     Mónica Wu is a 68 year old female with a PMH of HTN, breast ca.    Following for:  1. 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 Gross hematuria   3. Cystitis    PCP - Shane MINAYA 12/13/2023    Presents for check-up, cysto, discuss next steps.  Has chronic right knee stiffness and pain.    She feels good. Appetite and energy are good. No frequency, urgency, hematuria, dysuria.    UA is negative    Reported ~ 8 oz water, > 100 oz coffee, 8 oz tea with light yellow urine. Now 40 water, 40 coffee, tea with light yellow urine.    UTI hx: none  Tobacco hx: > 40 pack years, 1 PPD  Kidney stone hx: none  Fam h/o  malignancy: sister kidney cancer (non-smoker)    Cysto today: s/p TUR. No abnormalities. Cystitis has improved. Has a tiny red cyst near prior resection site which we will monitor    CTU 6/3/24: new 8 x 6 mm liver lesion, probable hemangioma but MR liver recommended for further eval  CT AP + IVC 2/22/23: bladder mass near right trigone (> 2 cm) with posterior bladder wall thickening    Have discussed that this puts the patient in the intermediate risk group for NMIBC.  We reviewed the NCCN guidelines for follow-up for intermediate risk NMIBC, which includes:  - cysto with urine cytology at 3, 6, 12, 18, 24 mo, then annually up to 5 y from diagnosis, then as indicated  - baseline upper tract imaging, then as indicated    Discussed options and will plan for MR liver protocol per Rads recs and would defer to PCP if any further w/u or f/u is required.    Recommend she continue to drink plenty of water to help with mild cystitis changes. Will plan for cytology today and cysto with urine cytology in 6 mo months.    PROCEDURE NOTE    PROCEDURE PERFORMED: Flexible Cystoscopy    After informed consent and urinalysis was obtained, patient was placed in the modified lithotomy position and all pressure points were padded. She was prepped and draped in the  usual sterile fashion using Betadine.   A 16 Italian flexible scope was passed through the urethra, and the bladder was entered and examined in its entirety.     Findings: as noted above    The patient tolerated the procedure well, suffered no complications, was able to void spontaneously after completion of the procedure in the office, and left the office in good condition.    Shwetha-procedural antibiotics were given.  _____________________    Prior note:    Cysto today: large (~ 4 cm) tumor over the right ureteral orifice, papillary. Left ureteral orifice is normal, uninvolved. 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    High blood pressure    Pain in joints    Ankle and knee inflammation    Smoker    Visual impairment    glasses    Wears glasses      Past Surgical History:   Procedure Laterality Date    Colonoscopy      Lumpectomy left Left 12/2016    DCIS    Diya biopsy stereo nodule 1 site left (cpt=19081)  11/2016    DCIS    Needle biopsy right Right 11/2015    US guided bx - benign    Radiation left Left 2017    lt lump with rad      Family History   Problem Relation Age of Onset    DCIS Self     Breast Cancer Self 59    Breast Cancer Mother 35    Heart Attack Father     Breast Cancer Sister 49    Cancer Sister 60        Kidney    Arthritis Sister     Heart Attack Brother     Cancer Maternal Grandmother         unknown      Social History:   Social History     Socioeconomic History    Marital status:    Occupational  History    Occupation:      Comment: part time   Tobacco Use    Smoking status: Every Day     Current packs/day: 1.00     Average packs/day: 1 pack/day for 41.0 years (41.0 ttl pk-yrs)     Types: Cigarettes    Smokeless tobacco: Never   Vaping Use    Vaping status: Never Used   Substance and Sexual Activity    Alcohol use: No    Drug use: No    Sexual activity: Never   Social History Narrative    Lives in [place] with     Prefers treatment here in [place]     Social Determinants of Health      Received from [facility], [facility]        Medications (Active prior to today's visit):  Current Outpatient Medications   Medication Sig Dispense Refill    Calcium Carbonate Antacid (TUMS ULTRA 1000 OR)       Vitamin K 100 MCG Oral Tab       Vitamin D, Cholecalciferol, 50 MCG (2000 UT) Oral Cap       colchicine 0.6 MG Oral Tab Take 1 tablet (0.6 mg total) by mouth 2 (two) times daily. 180 tablet 1    folic acid 1 MG Oral Tab Take 3 tablets (3 mg total) by mouth daily. 270 tablet 3    methotrexate 2.5 MG Oral Tab Take 10 tablets (25 mg total) by mouth once a week. 130 tablet 0    latanoprost 0.005 % Ophthalmic Solution INSTILL 1 DROP INTO AFFECTED EYES DAILY AT BEDTIME      Irbesartan 150 MG Oral Tab TAKE 1 TABLET BY MOUTH DAILY 90 tablet 1    acetaminophen 500 MG Oral Tab Take 1 tablet (500 mg total) by mouth every 6 (six) hours as needed for Pain.      ibuprofen 200 MG Oral Tab Take 1 tablet (200 mg total) by mouth every 6 (six) hours as needed for Pain.      Timolol Maleate 0.5 % Ophthalmic Solution       Multiple Vitamin (ONE-DAILY MULTI VITAMINS) Oral Tab Take 1 tablet by mouth daily.         Allergies:  Allergies   Allergen Reactions    Wellbutrin [Bupropion] OTHER (SEE COMMENTS)     Tremors/shakes         ROS:     A comprehensive 10 point review of systems was completed.  Pertinent positives and negatives noted in the the HPI.    PHYSICAL EXAM:     GENERAL APPEARANCE:  well, developed, well nourished, in no acute distress  NEUROLOGIC: nonfocal, alert and oriented  HEAD: normocephalic, atraumatic  EYES: sclera non-icteric  EARS: hearing intact  ORAL CAVITY: mucosa moist  NECK/THYROID: no obvious goiter or masses  LUNGS: nonlabored breathing  ABDOMEN: soft, no obvious masses or tenderness  SKIN: no obvious rashes    : as noted above     ASSESSMENT/PLAN:   Diagnoses and all orders for this visit:    Malignant neoplasm of lateral wall of urinary bladder (HCC)  -     POC Urinalysis, Automated Dip without microscopy ([facility] and [facility] ONLY) [17365]  -     ciprofloxacin (Cipro) tab 500 mg  -     CYSTOURETHROSCOPY  -     Cytology fluids    Gross hematuria    Cystitis    Liver mass  -     MRI ABDOMEN (W+WO) (CPT=74183); Future    - as noted above.    Thanks again for this consult.    Dandre Rdz MD, FACS  Urologist  [facility]  Office: [phone]

## 2024-06-12 ENCOUNTER — PROCEDURE (OUTPATIENT)
Dept: SURGERY | Facility: CLINIC | Age: 69
End: 2024-06-12
Payer: MEDICARE

## 2024-06-12 ENCOUNTER — LAB ENCOUNTER (OUTPATIENT)
Dept: LAB | Age: 69
End: 2024-06-12
Attending: INTERNAL MEDICINE
Payer: MEDICARE

## 2024-06-12 DIAGNOSIS — C67.2 MALIGNANT NEOPLASM OF LATERAL WALL OF URINARY BLADDER (HCC): Primary | ICD-10-CM

## 2024-06-12 DIAGNOSIS — N30.90 CYSTITIS: ICD-10-CM

## 2024-06-12 DIAGNOSIS — R31.0 GROSS HEMATURIA: ICD-10-CM

## 2024-06-12 DIAGNOSIS — M25.50 ARTHRALGIA, UNSPECIFIED JOINT: ICD-10-CM

## 2024-06-12 DIAGNOSIS — M06.00 SERONEGATIVE RHEUMATOID ARTHRITIS (HCC): ICD-10-CM

## 2024-06-12 DIAGNOSIS — M10.9 GOUT, UNSPECIFIED CAUSE, UNSPECIFIED CHRONICITY, UNSPECIFIED SITE: ICD-10-CM

## 2024-06-12 DIAGNOSIS — M11.20 CHONDROCALCINOSIS: ICD-10-CM

## 2024-06-12 DIAGNOSIS — M81.0 AGE-RELATED OSTEOPOROSIS WITHOUT CURRENT PATHOLOGICAL FRACTURE: ICD-10-CM

## 2024-06-12 DIAGNOSIS — R16.0 LIVER MASS: ICD-10-CM

## 2024-06-12 LAB
ALBUMIN SERPL-MCNC: 3.6 G/DL (ref 3.4–5)
ALBUMIN/GLOB SERPL: 1 {RATIO} (ref 1–2)
ALP LIVER SERPL-CCNC: 107 U/L
ALT SERPL-CCNC: 24 U/L
ANION GAP SERPL CALC-SCNC: 5 MMOL/L (ref 0–18)
APPEARANCE: CLEAR
AST SERPL-CCNC: 13 U/L (ref 15–37)
BASOPHILS # BLD AUTO: 0.1 X10(3) UL (ref 0–0.2)
BASOPHILS NFR BLD AUTO: 1.3 %
BILIRUB SERPL-MCNC: 0.6 MG/DL (ref 0.1–2)
BILIRUBIN: NEGATIVE
BUN BLD-MCNC: 10 MG/DL (ref 9–23)
CALCIUM BLD-MCNC: 9.4 MG/DL (ref 8.5–10.1)
CHLORIDE SERPL-SCNC: 105 MMOL/L (ref 98–112)
CO2 SERPL-SCNC: 28 MMOL/L (ref 21–32)
CREAT BLD-MCNC: 0.71 MG/DL
EGFRCR SERPLBLD CKD-EPI 2021: 94 ML/MIN/1.73M2 (ref 60–?)
EOSINOPHIL # BLD AUTO: 0.12 X10(3) UL (ref 0–0.7)
EOSINOPHIL NFR BLD AUTO: 1.6 %
ERYTHROCYTE [DISTWIDTH] IN BLOOD BY AUTOMATED COUNT: 14.9 %
FASTING STATUS PATIENT QL REPORTED: YES
GLOBULIN PLAS-MCNC: 3.6 G/DL (ref 2.8–4.4)
GLUCOSE (URINE DIPSTICK): NEGATIVE MG/DL
GLUCOSE BLD-MCNC: 91 MG/DL (ref 70–99)
HCT VFR BLD AUTO: 41.1 %
HGB BLD-MCNC: 13.7 G/DL
IMM GRANULOCYTES # BLD AUTO: 0.03 X10(3) UL (ref 0–1)
IMM GRANULOCYTES NFR BLD: 0.4 %
KETONES (URINE DIPSTICK): NEGATIVE MG/DL
LYMPHOCYTES # BLD AUTO: 2.32 X10(3) UL (ref 1–4)
LYMPHOCYTES NFR BLD AUTO: 30.2 %
MAGNESIUM SERPL-MCNC: 2.1 MG/DL (ref 1.6–2.6)
MCH RBC QN AUTO: 32 PG (ref 26–34)
MCHC RBC AUTO-ENTMCNC: 33.3 G/DL (ref 31–37)
MCV RBC AUTO: 96 FL
MONOCYTES # BLD AUTO: 0.61 X10(3) UL (ref 0.1–1)
MONOCYTES NFR BLD AUTO: 8 %
MULTISTIX LOT#: ABNORMAL NUMERIC
NEUTROPHILS # BLD AUTO: 4.49 X10 (3) UL (ref 1.5–7.7)
NEUTROPHILS # BLD AUTO: 4.49 X10(3) UL (ref 1.5–7.7)
NEUTROPHILS NFR BLD AUTO: 58.5 %
NITRITE, URINE: NEGATIVE
OCCULT BLOOD: NEGATIVE
OSMOLALITY SERPL CALC.SUM OF ELEC: 285 MOSM/KG (ref 275–295)
PH, URINE: 7 (ref 4.5–8)
PHOSPHATE SERPL-MCNC: 3.9 MG/DL (ref 2.5–4.9)
PLATELET # BLD AUTO: 434 10(3)UL (ref 150–450)
POTASSIUM SERPL-SCNC: 4.1 MMOL/L (ref 3.5–5.1)
PROT SERPL-MCNC: 7.2 G/DL (ref 6.4–8.2)
PROTEIN (URINE DIPSTICK): NEGATIVE MG/DL
PTH-INTACT SERPL-MCNC: 40 PG/ML (ref 18.5–88)
RBC # BLD AUTO: 4.28 X10(6)UL
SODIUM SERPL-SCNC: 138 MMOL/L (ref 136–145)
SPECIFIC GRAVITY: 1.01 (ref 1–1.03)
URINE-COLOR: YELLOW
UROBILINOGEN,SEMI-QN: 0.2 MG/DL (ref 0–1.9)
VIT D+METAB SERPL-MCNC: 29.7 NG/ML (ref 30–100)
WBC # BLD AUTO: 7.7 X10(3) UL (ref 4–11)

## 2024-06-12 PROCEDURE — 99213 OFFICE O/P EST LOW 20 MIN: CPT | Performed by: UROLOGY

## 2024-06-12 PROCEDURE — 82306 VITAMIN D 25 HYDROXY: CPT

## 2024-06-12 PROCEDURE — 80053 COMPREHEN METABOLIC PANEL: CPT

## 2024-06-12 PROCEDURE — 83735 ASSAY OF MAGNESIUM: CPT

## 2024-06-12 PROCEDURE — 52000 CYSTOURETHROSCOPY: CPT | Performed by: UROLOGY

## 2024-06-12 PROCEDURE — 81003 URINALYSIS AUTO W/O SCOPE: CPT | Performed by: UROLOGY

## 2024-06-12 PROCEDURE — 84100 ASSAY OF PHOSPHORUS: CPT

## 2024-06-12 PROCEDURE — 83970 ASSAY OF PARATHORMONE: CPT

## 2024-06-12 PROCEDURE — 85025 COMPLETE CBC W/AUTO DIFF WBC: CPT

## 2024-06-12 RX ORDER — CIPROFLOXACIN 500 MG/1
500 TABLET, FILM COATED ORAL ONCE
Status: COMPLETED | OUTPATIENT
Start: 2024-06-12 | End: 2024-06-12

## 2024-06-12 RX ORDER — MULTIVIT-MIN/IRON/FOLIC ACID/K 18-600-40
CAPSULE ORAL
COMMUNITY
Start: 2024-05-02

## 2024-06-12 RX ADMIN — CIPROFLOXACIN 500 MG: 500 TABLET, FILM COATED ORAL at 09:55:00

## 2024-06-14 ENCOUNTER — TELEPHONE (OUTPATIENT)
Dept: RHEUMATOLOGY | Facility: CLINIC | Age: 69
End: 2024-06-14

## 2024-06-14 DIAGNOSIS — R79.89 LOW VITAMIN D LEVEL: Primary | ICD-10-CM

## 2024-06-14 RX ORDER — ERGOCALCIFEROL 1.25 MG/1
50000 CAPSULE ORAL WEEKLY
Qty: 12 CAPSULE | Refills: 0 | Status: SHIPPED | OUTPATIENT
Start: 2024-06-14 | End: 2024-09-06

## 2024-06-18 LAB — NON GYNE INTERPRETATION: NEGATIVE

## 2024-06-19 ENCOUNTER — OFFICE VISIT (OUTPATIENT)
Dept: HEMATOLOGY/ONCOLOGY | Facility: HOSPITAL | Age: 69
End: 2024-06-19
Attending: INTERNAL MEDICINE

## 2024-06-19 VITALS
RESPIRATION RATE: 16 BRPM | DIASTOLIC BLOOD PRESSURE: 60 MMHG | TEMPERATURE: 99 F | HEART RATE: 75 BPM | SYSTOLIC BLOOD PRESSURE: 140 MMHG | OXYGEN SATURATION: 99 %

## 2024-06-19 DIAGNOSIS — M81.0 SENILE OSTEOPOROSIS: Primary | ICD-10-CM

## 2024-06-19 PROCEDURE — 96365 THER/PROPH/DIAG IV INF INIT: CPT

## 2024-06-19 RX ORDER — ZOLEDRONIC ACID 5 MG/100ML
5 INJECTION, SOLUTION INTRAVENOUS ONCE
Status: COMPLETED | OUTPATIENT
Start: 2024-06-19 | End: 2024-06-19

## 2024-06-19 RX ORDER — ZOLEDRONIC ACID 5 MG/100ML
5 INJECTION, SOLUTION INTRAVENOUS ONCE
Status: CANCELLED | OUTPATIENT
Start: 2024-06-19

## 2024-06-19 RX ADMIN — ZOLEDRONIC ACID 5 MG: 5 INJECTION, SOLUTION INTRAVENOUS at 11:58:00

## 2024-06-19 NOTE — PROGRESS NOTES
Pt here for Reclast . Pt denies any issues or concerns.      Ordering MD: Dr. Olmedo  Order Exp: one-time dose     Pt tolerated infusion without difficulty or complaint. Reviewed next apt date/time: no appointments to set up at this time, Reclast is given once yearly      Education Record  Learner:  Patient  Disease / Diagnosis: Age-related osteoporosis  Barriers / Limitations:  None  Method:  Brief focused and Reinforcement; printed material about Reclast was given to the patient. Advised patient to push fluids after getting this infusion.  General Topics:  Medication, Side effects and symptom management, and Plan of care reviewed  Outcome:  Shows understanding

## 2024-07-09 DIAGNOSIS — I10 ESSENTIAL HYPERTENSION: ICD-10-CM

## 2024-07-12 RX ORDER — IRBESARTAN 150 MG/1
150 TABLET ORAL DAILY
Qty: 90 TABLET | Refills: 0 | Status: SHIPPED | OUTPATIENT
Start: 2024-07-12

## 2024-07-25 ENCOUNTER — HOSPITAL ENCOUNTER (OUTPATIENT)
Dept: MRI IMAGING | Age: 69
Discharge: HOME | End: 2024-07-25
Attending: UROLOGY
Payer: MEDICARE

## 2024-07-25 DIAGNOSIS — R16.0 LIVER MASS: ICD-10-CM

## 2024-07-25 PROCEDURE — A9581 GADOXETATE DISODIUM INJ: HCPCS | Performed by: UROLOGY

## 2024-07-25 PROCEDURE — 74183 MRI ABD W/O CNTR FLWD CNTR: CPT | Performed by: UROLOGY

## 2024-07-29 DIAGNOSIS — M06.00 SERONEGATIVE RHEUMATOID ARTHRITIS (HCC): ICD-10-CM

## 2024-07-30 RX ORDER — METHOTREXATE 2.5 MG/1
25 TABLET ORAL WEEKLY
Qty: 130 TABLET | Refills: 0 | Status: SHIPPED | OUTPATIENT
Start: 2024-07-30 | End: 2024-10-29

## 2024-07-30 NOTE — TELEPHONE ENCOUNTER
LOV:  05/01/2024    Future Appointments   Date Time Provider Department Center   8/28/2024  4:15 PM [facility] RM2 (1.5T WIDE) [facility]   9/9/2024  2:15 PM Isaias Olmedo MD [id number]   12/11/2024  9:00 AM Dandre Rdz MD [id number]       LF:  05/01/2024    QTY:  130    Refills:  0     LABS:    Component      Latest Ref Rng 6/12/2024   WBC      4.0 - 11.0 x10(3) uL 7.7    RBC      3.80 - 5.30 x10(6)uL 4.28    Hemoglobin      12.0 - 16.0 g/dL 13.7    Hematocrit      35.0 - 48.0 % 41.1    Platelet Count      150.0 - 450.0 10(3)uL 434.0    MCV      80.0 - 100.0 fL 96.0    MCH      26.0 - 34.0 pg 32.0    MCHC      31.0 - 37.0 g/dL 33.3    RDW      % 14.9    Prelim Neutrophil Abs      1.50 - 7.70 x10 (3) uL 4.49    Neutrophils Absolute      1.50 - 7.70 x10(3) uL 4.49    Lymphocytes Absolute      1.00 - 4.00 x10(3) uL 2.32    Monocytes Absolute      0.10 - 1.00 x10(3) uL 0.61    Eosinophils Absolute      0.00 - 0.70 x10(3) uL 0.12    Basophils Absolute      0.00 - 0.20 x10(3) uL 0.10    Immature Granulocyte Absolute      0.00 - 1.00 x10(3) uL 0.03    Neutrophils %      % 58.5    Lymphocytes %      % 30.2    Monocytes %      % 8.0    Eosinophils %      % 1.6    Basophils %      % 1.3    Immature Granulocyte %      % 0.4    Glucose      70 - 99 mg/dL 91    Sodium      136 - 145 mmol/L 138    Potassium      3.5 - 5.1 mmol/L 4.1    Chloride      98 - 112 mmol/L 105    Carbon Dioxide, Total      21.0 - 32.0 mmol/L 28.0    ANION GAP      0 - 18 mmol/L 5    BUN      9 - 23 mg/dL 10    CREATININE      0.55 - 1.02 mg/dL 0.71    CALCIUM      8.5 - 10.1 mg/dL 9.4    CALCULATED OSMOLALITY      275 - 295 mOsm/kg 285    EGFR      >=60 mL/min/1.73m2 94    AST (SGOT)      15 - 37 U/L 13 (L)    ALT (SGPT)      13 - 56 U/L 24    ALKALINE PHOSPHATASE      55 - 142 U/L 107    Total Bilirubin      0.1 - 2.0 mg/dL 0.6    PROTEIN, TOTAL      6.4 - 8.2 g/dL 7.2    Albumin      3.4 - 5.0 g/dL 3.6    Globulin      2.8 -  4.4 g/dL 3.6    A/G Ratio      1.0 - 2.0  1.0    Patient Fasting for CMP? Yes       Legend:  (L) Low

## 2024-08-08 ENCOUNTER — OFFICE VISIT (OUTPATIENT)
Dept: INTERNAL MEDICINE CLINIC | Facility: CLINIC | Age: 69
End: 2024-08-08
Payer: MEDICARE

## 2024-08-08 VITALS
HEART RATE: 78 BPM | SYSTOLIC BLOOD PRESSURE: 118 MMHG | DIASTOLIC BLOOD PRESSURE: 76 MMHG | OXYGEN SATURATION: 98 % | WEIGHT: 111 LBS | RESPIRATION RATE: 18 BRPM | TEMPERATURE: 98 F | HEIGHT: 62 IN | BODY MASS INDEX: 20.43 KG/M2

## 2024-08-08 DIAGNOSIS — H66.90 ACUTE OTITIS MEDIA, UNSPECIFIED OTITIS MEDIA TYPE: Primary | ICD-10-CM

## 2024-08-08 PROCEDURE — 99213 OFFICE O/P EST LOW 20 MIN: CPT

## 2024-08-08 RX ORDER — AMOXICILLIN AND CLAVULANATE POTASSIUM 875; 125 MG/1; MG/1
1 TABLET, FILM COATED ORAL 2 TIMES DAILY
Qty: 14 TABLET | Refills: 0 | Status: SHIPPED | OUTPATIENT
Start: 2024-08-08

## 2024-08-08 NOTE — PROGRESS NOTES
Mónica Wu is a 68 year old female.   Chief Complaint   Patient presents with    Ear Pain     Ear infection. Left ear. Right ear has pain also.since Monday night     HPI:    Monday felt both ears aching, Tuesday started with increasing left ear pain which has progressed. Today pain is the worst since onset in left ear. No recent illness or travel. Denies body aches, chills, headaches.      Allergies:  Allergies   Allergen Reactions    Wellbutrin [Bupropion] OTHER (SEE COMMENTS)     Tremors/shakes      Current Meds:  Current Outpatient Medications   Medication Sig Dispense Refill    methotrexate 2.5 MG Oral Tab Take 10 tablets (25 mg total) by mouth once a week. 130 tablet 0    Irbesartan 150 MG Oral Tab Take 1 tablet (150 mg total) by mouth daily. 90 tablet 0    ergocalciferol 1.25 MG (69840 UT) Oral Cap Take 1 capsule (50,000 Units total) by mouth once a week. 12 capsule 0    Calcium Carbonate Antacid (TUMS ULTRA 1000 OR)       Vitamin K 100 MCG Oral Tab       Vitamin D, Cholecalciferol, 50 MCG (2000 UT) Oral Cap       colchicine 0.6 MG Oral Tab Take 1 tablet (0.6 mg total) by mouth 2 (two) times daily. 180 tablet 1    folic acid 1 MG Oral Tab Take 3 tablets (3 mg total) by mouth daily. 270 tablet 3    latanoprost 0.005 % Ophthalmic Solution INSTILL 1 DROP INTO AFFECTED EYES DAILY AT BEDTIME      acetaminophen 500 MG Oral Tab Take 1 tablet (500 mg total) by mouth every 6 (six) hours as needed for Pain.      ibuprofen 200 MG Oral Tab Take 1 tablet (200 mg total) by mouth every 6 (six) hours as needed for Pain.      Timolol Maleate 0.5 % Ophthalmic Solution       Multiple Vitamin (ONE-DAILY MULTI VITAMINS) Oral Tab Take 1 tablet by mouth daily.          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    Respiratory: Negative.     Cardiovascular: Negative.             PHYSICAL EXAM:    /76   Pulse 78   Temp 98.2 °F (36.8 °C) (Temporal)   Resp 18   Ht 5' 2\" (1.575 m)   Wt 111 lb (50.3 kg)   SpO2 98%   BMI 20.30 kg/m²   Physical Exam  Constitutional:       Appearance: Normal appearance.   HENT:      Right Ear: A middle ear effusion is present.      Left Ear: Tenderness present. A middle ear effusion is present. Tympanic membrane is erythematous and bulging.   Skin:     General: Skin is warm and dry.   Neurological:      Mental Status: She is alert. Mental status is at baseline.        ASSESSMENT/ PLAN:   1. Acute otitis media, unspecified otitis media type  Augmentin x 7 days with food, flonase and zyrtec or xyzal daily. Sleep slightly elevated. Call if symptoms worsen. Discussed GI s/e of medications.   - amoxicillin clavulanate 875-125 MG Oral Tab; Take 1 tablet by mouth 2 (two) times daily.  Dispense: 14 tablet; Refill: 0      Health Maintenance Due   Topic Date Due    COVID-19 Vaccine (4 - 2023-24 season) 09/01/2023    Zoster Vaccines (2 of 2) 01/20/2024    HTN: BP Follow-Up  06/01/2024           Pt indicates understanding and agrees to the plan.     No follow-ups on file.    SHELDON Meyers

## 2024-08-28 ENCOUNTER — HOSPITAL ENCOUNTER (OUTPATIENT)
Dept: MRI IMAGING | Facility: HOSPITAL | Age: 69
Discharge: HOME | End: 2024-08-28
Payer: MEDICARE

## 2024-08-28 DIAGNOSIS — C50.919 CHEK2-RELATED BREAST CANCER (HCC): ICD-10-CM

## 2024-08-28 DIAGNOSIS — Z12.39 BREAST CANCER SCREENING, HIGH RISK PATIENT: ICD-10-CM

## 2024-08-28 DIAGNOSIS — Z15.02 CHEK2-RELATED BREAST CANCER (HCC): ICD-10-CM

## 2024-08-28 DIAGNOSIS — Z15.89 CHEK2-RELATED BREAST CANCER (HCC): ICD-10-CM

## 2024-08-28 DIAGNOSIS — Z15.09 CHEK2-RELATED BREAST CANCER (HCC): ICD-10-CM

## 2024-08-28 DIAGNOSIS — D05.12 INTRADUCTAL CARCINOMA IN SITU OF LEFT BREAST: ICD-10-CM

## 2024-08-28 PROCEDURE — A9575 INJ GADOTERATE MEGLUMI 0.1ML: HCPCS

## 2024-08-28 PROCEDURE — 77049 MRI BREAST C-+ W/CAD BI: CPT

## 2024-08-28 RX ORDER — GADOTERATE MEGLUMINE 376.9 MG/ML
15 INJECTION INTRAVENOUS
Status: COMPLETED | OUTPATIENT
Start: 2024-08-28 | End: 2024-08-28

## 2024-08-28 RX ADMIN — GADOTERATE MEGLUMINE 11 ML: 376.9 INJECTION INTRAVENOUS at 17:08:00

## 2024-09-09 ENCOUNTER — OFFICE VISIT (OUTPATIENT)
Dept: RHEUMATOLOGY | Facility: CLINIC | Age: 69
End: 2024-09-09
Payer: MEDICARE

## 2024-09-09 ENCOUNTER — LAB ENCOUNTER (OUTPATIENT)
Dept: LAB | Age: 69
End: 2024-09-09
Attending: INTERNAL MEDICINE
Payer: MEDICARE

## 2024-09-09 VITALS
DIASTOLIC BLOOD PRESSURE: 68 MMHG | RESPIRATION RATE: 16 BRPM | OXYGEN SATURATION: 97 % | HEIGHT: 62 IN | BODY MASS INDEX: 19.66 KG/M2 | TEMPERATURE: 97 F | HEART RATE: 63 BPM | WEIGHT: 106.81 LBS | SYSTOLIC BLOOD PRESSURE: 138 MMHG

## 2024-09-09 DIAGNOSIS — M89.9 DISORDER OF BONE, UNSPECIFIED: ICD-10-CM

## 2024-09-09 DIAGNOSIS — M76.32 ILIOTIBIAL BAND SYNDROME OF LEFT SIDE: ICD-10-CM

## 2024-09-09 DIAGNOSIS — M70.62 GREATER TROCHANTERIC BURSITIS OF BOTH HIPS: ICD-10-CM

## 2024-09-09 DIAGNOSIS — R79.89 LOW VITAMIN D LEVEL: ICD-10-CM

## 2024-09-09 DIAGNOSIS — Z51.81 THERAPEUTIC DRUG MONITORING: ICD-10-CM

## 2024-09-09 DIAGNOSIS — M06.00 SERONEGATIVE RHEUMATOID ARTHRITIS (HCC): ICD-10-CM

## 2024-09-09 DIAGNOSIS — Z79.899 IMMUNODEFICIENCY DUE TO DRUG THERAPY (HCC): ICD-10-CM

## 2024-09-09 DIAGNOSIS — M11.20 CHONDROCALCINOSIS: ICD-10-CM

## 2024-09-09 DIAGNOSIS — M76.31 ILIOTIBIAL BAND SYNDROME OF RIGHT SIDE: ICD-10-CM

## 2024-09-09 DIAGNOSIS — M25.50 ARTHRALGIA, UNSPECIFIED JOINT: ICD-10-CM

## 2024-09-09 DIAGNOSIS — M10.9 GOUT, UNSPECIFIED CAUSE, UNSPECIFIED CHRONICITY, UNSPECIFIED SITE: ICD-10-CM

## 2024-09-09 DIAGNOSIS — M17.0 BILATERAL PRIMARY OSTEOARTHRITIS OF KNEE: ICD-10-CM

## 2024-09-09 DIAGNOSIS — D84.821 IMMUNODEFICIENCY DUE TO DRUG THERAPY (HCC): ICD-10-CM

## 2024-09-09 DIAGNOSIS — M06.00 SERONEGATIVE RHEUMATOID ARTHRITIS (HCC): Primary | ICD-10-CM

## 2024-09-09 DIAGNOSIS — M70.61 GREATER TROCHANTERIC BURSITIS OF BOTH HIPS: ICD-10-CM

## 2024-09-09 LAB
ALBUMIN SERPL-MCNC: 4.4 G/DL (ref 3.2–4.8)
ALBUMIN/GLOB SERPL: 1.8 {RATIO} (ref 1–2)
ALP LIVER SERPL-CCNC: 68 U/L
ALT SERPL-CCNC: 19 U/L
ANION GAP SERPL CALC-SCNC: 3 MMOL/L (ref 0–18)
AST SERPL-CCNC: 14 U/L (ref ?–34)
BASOPHILS # BLD AUTO: 0.07 X10(3) UL (ref 0–0.2)
BASOPHILS NFR BLD AUTO: 0.8 %
BILIRUB SERPL-MCNC: 1 MG/DL (ref 0.2–1.1)
BUN BLD-MCNC: 9 MG/DL (ref 9–23)
CALCIUM BLD-MCNC: 10 MG/DL (ref 8.7–10.4)
CHLORIDE SERPL-SCNC: 106 MMOL/L (ref 98–112)
CO2 SERPL-SCNC: 30 MMOL/L (ref 21–32)
CREAT BLD-MCNC: 0.59 MG/DL
CRP SERPL-MCNC: 0.4 MG/DL (ref ?–0.5)
EGFRCR SERPLBLD CKD-EPI 2021: 99 ML/MIN/1.73M2 (ref 60–?)
EOSINOPHIL # BLD AUTO: 0.17 X10(3) UL (ref 0–0.7)
EOSINOPHIL NFR BLD AUTO: 1.9 %
ERYTHROCYTE [DISTWIDTH] IN BLOOD BY AUTOMATED COUNT: 14.6 %
ERYTHROCYTE [SEDIMENTATION RATE] IN BLOOD: 22 MM/HR
FASTING STATUS PATIENT QL REPORTED: NO
GLOBULIN PLAS-MCNC: 2.5 G/DL (ref 2–3.5)
GLUCOSE BLD-MCNC: 96 MG/DL (ref 70–99)
HCT VFR BLD AUTO: 39.1 %
HGB BLD-MCNC: 13.6 G/DL
IMM GRANULOCYTES # BLD AUTO: 0.03 X10(3) UL (ref 0–1)
IMM GRANULOCYTES NFR BLD: 0.3 %
LYMPHOCYTES # BLD AUTO: 3.13 X10(3) UL (ref 1–4)
LYMPHOCYTES NFR BLD AUTO: 35.5 %
MCH RBC QN AUTO: 34.8 PG (ref 26–34)
MCHC RBC AUTO-ENTMCNC: 34.8 G/DL (ref 31–37)
MCV RBC AUTO: 100 FL
MONOCYTES # BLD AUTO: 0.78 X10(3) UL (ref 0.1–1)
MONOCYTES NFR BLD AUTO: 8.9 %
NEUTROPHILS # BLD AUTO: 4.63 X10 (3) UL (ref 1.5–7.7)
NEUTROPHILS # BLD AUTO: 4.63 X10(3) UL (ref 1.5–7.7)
NEUTROPHILS NFR BLD AUTO: 52.6 %
OSMOLALITY SERPL CALC.SUM OF ELEC: 287 MOSM/KG (ref 275–295)
PLATELET # BLD AUTO: 353 10(3)UL (ref 150–450)
POTASSIUM SERPL-SCNC: 4.9 MMOL/L (ref 3.5–5.1)
PROT SERPL-MCNC: 6.9 G/DL (ref 5.7–8.2)
RBC # BLD AUTO: 3.91 X10(6)UL
SODIUM SERPL-SCNC: 139 MMOL/L (ref 136–145)
VIT D+METAB SERPL-MCNC: 61.9 NG/ML (ref 30–100)
WBC # BLD AUTO: 8.8 X10(3) UL (ref 4–11)

## 2024-09-09 PROCEDURE — 80053 COMPREHEN METABOLIC PANEL: CPT

## 2024-09-09 PROCEDURE — 86140 C-REACTIVE PROTEIN: CPT

## 2024-09-09 PROCEDURE — 82306 VITAMIN D 25 HYDROXY: CPT

## 2024-09-09 PROCEDURE — 85652 RBC SED RATE AUTOMATED: CPT

## 2024-09-09 PROCEDURE — 85025 COMPLETE CBC W/AUTO DIFF WBC: CPT

## 2024-09-09 RX ORDER — COLCHICINE 0.6 MG/1
0.6 TABLET ORAL 2 TIMES DAILY
Qty: 180 TABLET | Refills: 1 | Status: SHIPPED | OUTPATIENT
Start: 2024-09-09

## 2024-09-09 RX ORDER — FOLIC ACID 1 MG/1
3 TABLET ORAL DAILY
Qty: 270 TABLET | Refills: 3 | Status: SHIPPED | OUTPATIENT
Start: 2024-09-09

## 2024-09-09 RX ORDER — METHOTREXATE 2.5 MG/1
25 TABLET ORAL WEEKLY
Qty: 130 TABLET | Refills: 0 | Status: SHIPPED | OUTPATIENT
Start: 2024-09-09 | End: 2024-12-09

## 2024-09-09 RX ORDER — PREDNISONE 5 MG/1
TABLET ORAL
Qty: 32 TABLET | Refills: 2 | Status: SHIPPED | OUTPATIENT
Start: 2024-09-09 | End: 2024-09-19

## 2024-09-09 NOTE — PROGRESS NOTES
Rheumatology f/u Patient Note  =====================================================================================================    Chief complaint: crystalline arthritis/seronegative rheumatoid arthritis    Chief Complaint   Patient presents with    Follow - Up     LOV 5/1/2024  Rapid 3 score is a 4.7  Patient states she has a hard time getting around overall and can not walk for very long. She has bilateral knee pain 6/10 at times. She had her Reclast in June.      Ortho:   Kai Bishop MD    PCP  Jennifer Lynn MD  Fax: [phone]  Phone: [phone]  =====================================================================================================  HPI   Date of visit: 3/20/2023  Mónica Wu is a 68 year old female   Here for further evaluation of inflammatory arthritis.  -PMHx: Non-invasive low grade papillary urothelial carcinoma, chronic smoker  -Patient reports chronic arthralgia in the ankles for over 20 years.  More recently she has had persistent right knee swelling for which she underwent evaluation by orthopedic surgery (Dr. Bishop).  30 cc of inflammatory fluid was aspirated from the right knee and December 2022.  Corticosteroid injection provided at the time led to 2 months of complete relief with partial recurrence of symptoms in the last couple months or so.  -She tried meloxicam 15 mg daily which helped with arthralgia significantly but she ended up with dyspepsia so she returned to her usual ibuprofen regimen.  -day pain is the worst  -dry eye: Previously using Restasis but cannot afford it given several $100 co-pay.  -sister and grandmother with RA.  -No family history of gout or any other autoimmune disease.  Medications:  Ibuprofen 200 mg every 2 hours, takes 8-12 each day.   ==============================================================================================================  Visit: 10/06/23  Patient doing better since last visit.  Right knee swelling, left  ankle swelling persist.  Arthralgia has resolved.  Trip to [facility] went very well.  Had to take prednisone on the day of the wedding that she attended.  Right knee corticosteroid injection at the last visit worked very well for 6 to 8 weeks.  Denies any fevers or any constitutional symptoms  -Continues on colchicine 0.6 mg twice daily.  No side effects  5 point ROS negative except noted above  I had reviewed past medical and family histories together with allergy and medication lists documented.  ==============================================================================================================  Visit: 01/19/24  Patient was doing much better with methotrexate since the last visit.  Rheumatoid arthritis was quiescent until she obtained Shingrix vaccination, skip 1 dose of methotrexate as well.  Arthritis more active afterwards.  [place] and years with tough.  Took some prednisone around that time briefly.  Recent bronchitis, took prednisone 40 mg for this.  For 5 days.  Last dose was 2 days ago.  Joints are quite well today.  -Nausea and fatigue with taking methotrexate Sunday morning  Medications/therapies:  Colchicine 0.6 mg twice daily   Methotrexate 15 mg weekly  ==============================================================================================================  Visit: 05/01/24  Doing well. R knee slightly bothersome. Feels like methotrexate is wearing off during the week.  Right ankle slightly bothersome.  No fractures.  Still smoking.  Medications/therapies:  Colchicine 0.6 mg twice daily   Methotrexate 15 mg weekly  ==============================================================================================================  Today's Visit: 09/09/24    Doing very well. Then had a flare-up 6 weeks ago. Took prednisone 30 mg taper without success.  Pain is in the lateral knees.  Knees are not swollen.  Escalation of methotrexate since last visit has led to significant improvement in pain.   Still smoking.    Medications/therapies:  Colchicine 0.6 mg twice daily   Methotrexate 25 mg weekly (Fri 5 pills, Sat 5 pills)    5 point ROS negative except noted above  I had reviewed past medical and family histories together with allergy and medication lists documented.      Medications:  Current Outpatient Medications on File Prior to Visit   Medication Sig Dispense Refill    Irbesartan 150 MG Oral Tab Take 1 tablet (150 mg total) by mouth daily. 90 tablet 0    Calcium Carbonate Antacid (TUMS ULTRA 1000 OR)       Vitamin K 100 MCG Oral Tab       Vitamin D, Cholecalciferol, 50 MCG (2000 UT) Oral Cap       latanoprost 0.005 % Ophthalmic Solution INSTILL 1 DROP INTO AFFECTED EYES DAILY AT BEDTIME      acetaminophen 500 MG Oral Tab Take 1 tablet (500 mg total) by mouth every 6 (six) hours as needed for Pain.      ibuprofen 200 MG Oral Tab Take 1 tablet (200 mg total) by mouth every 6 (six) hours as needed for Pain.      Timolol Maleate 0.5 % Ophthalmic Solution       Multiple Vitamin (ONE-DAILY MULTI VITAMINS) Oral Tab Take 1 tablet by mouth daily.       No current facility-administered medications on file prior to visit.     ?  Allergies:  Allergies   Allergen Reactions    Wellbutrin [Bupropion] OTHER (SEE COMMENTS)     Tremors/shakes         Objective    Vitals:    09/09/24 1418   BP: 138/68   Pulse: 63   Resp: 16   Temp: 97.2 °F (36.2 °C)   SpO2: 97%   Weight: 106 lb 12.8 oz (48.4 kg)   Height: 5' 2\" (1.575 m)       GEN: NAD, well-nourished.   HEENT: Head: NCAT. Face: No lesions. Eyes: Conjunctiva clear. Sclera are anicteric. PERRLA. EOMs are full.   PULM:   easy effort  Extremities: No cyanosis, edema or deformities.   Neurologic: Strength, CN2-12 grossly intact   Psych: normal affect.   Skin: No lesions or rashes.  MSK: 28 joint count performed. No evidence of synovitis in mcp, pip, dip, wrist, elbows, shoulders, hips, knees, ankles, mtp unless otherwise noted. Full ROM of elbows, wrists, knees.     PtGA:  5  SJ: 0  TJ: 0  MDGA: 0    Tender palpation in bilateral lateral aspect of knee and along IT band to the hips, bilateral knee OA changes.  No synovitis noted in the ankles or knees        Labs:    Lab Results   Component Value Date    URIC 4.4 03/22/2023       Lab Results   Component Value Date    WBC 7.7 06/12/2024    RBC 4.28 06/12/2024    HGB 13.7 06/12/2024    HCT 41.1 06/12/2024    .0 06/12/2024    MCV 96.0 06/12/2024    MCH 32.0 06/12/2024    MCHC 33.3 06/12/2024    RDW 14.9 06/12/2024    NEPRELIM 4.49 06/12/2024    NEPERCENT 58.5 06/12/2024    LYPERCENT 30.2 06/12/2024    MOPERCENT 8.0 06/12/2024    EOPERCENT 1.6 06/12/2024    BAPERCENT 1.3 06/12/2024    NE 4.49 06/12/2024    LYMABS 2.32 06/12/2024    MOABSO 0.61 06/12/2024    EOABSO 0.12 06/12/2024    BAABSO 0.10 06/12/2024     Lab Results   Component Value Date    GLU 91 06/12/2024    BUN 10 06/12/2024    BUNCREA 18.0 07/26/2021    CREATSERUM 0.71 06/12/2024    ANIONGAP 5 06/12/2024     11/15/2016    GFRNAA 101 01/26/2022    GFRAA 116 01/26/2022    CA 9.4 06/12/2024    OSMOCALC 285 06/12/2024    ALKPHO 107 06/12/2024    AST 13 (L) 06/12/2024    ALT 24 06/12/2024    BILT 0.6 06/12/2024    TP 7.2 06/12/2024    ALB 3.6 06/12/2024    GLOBULIN 3.6 06/12/2024     06/12/2024    K 4.1 06/12/2024     06/12/2024    CO2 28.0 06/12/2024 12/2022 Right knee synovial fluid   WBC 8930, percent neutrophils 94%, 2% lymphocytes.  No crystals.    2/2023  Creatinine 0.48, rest of CMP WNL  CBC W differential WNL    3/2023  Sed rate 44  CRP 0.87 mg/DL  Hepatitis B/C WNL  Quant gold WNL  Ferritin 61.4  Mag/Phos WNL  PTH 55.4  RF, CCP is negative  LILY is negative  Uric acid 4.4  TSH WNL  Additional Labs:    Radiology:    2023 DEXA:   LUMBAR SPINE ANALYSIS RESULTS:      Bone mineral density (BMD) (g/cm2):  0.894    Lumbar T-Score:  -1.4      % young normals:  85      % age matched controls:  105      Change from prior spine examination:  0.4%                TOTAL HIP ANALYSIS RESULTS:        Bone mineral density (BMD) (g/cm2):  0.635      Total Hip T-Score:  -2.5      % young normals:  67      % age matched controls:  80      Change from prior hip examination:  -8.6*%               FEMORAL NECK ANALYSIS RESULTS:        Bone mineral density (BMD) (g/cm2):  0.498      Femoral neck T-Score:  -3.2      % young normals:  59      % age matched controls:  73      Change from prior hip examination:  -27.8*%           Radiology review:  MRI BREAST (W+WO) W/CAD BILAT (CPT=77049)    Result Date: 8/29/2024  CONCLUSION:  No suspicious enhancement in either breast to suggest malignancy.   BI-RADS CATEGORY: DIAGNOSTIC CATEGORY 2--BENIGN FINDING NO CHANGE FROM COMPARISON ASSESSMENT.   RECOMMENDATIONS: SCREENING MRI OF THE BREAST IN ONE YEAR     PLEASE NOTE:  A NORMAL MRI DOES NOT EXCLUDE THE POSSIBILITY OF BREAST CANCER.  A CLINICALLY SUSPICIOUS PALPABLE LUMP SHOULD BE BIOPSIED.  CORRELATION WITH CLINICAL EXAM AND OTHER IMAGING STUDIES IS RECOMMENDED.   LOCATION:  [facility]   Dictated by (CST): Fozia Schwartz MD on 8/29/2024 at 9:09 AM     Finalized by (CST): Fozia Schwartz MD on 8/29/2024 at 9:12 AM      =====================================================================================================  Assessment and Plan    Assessment:  1. Seronegative rheumatoid arthritis (HCC)    2. Greater trochanteric bursitis of both hips    3. Iliotibial band syndrome of left side    4. Iliotibial band syndrome of right side    5. Bilateral primary osteoarthritis of knee    6. Therapeutic drug monitoring    7. Low vitamin D level    8. Disorder of bone, unspecified    9. Chondrocalcinosis    10. Arthralgia, unspecified joint    11. Gout, unspecified cause, unspecified chronicity, unspecified site    12. 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 Right  knee synovial fluid aspirate in 12/2022 consistent with active inflammation.  Chondrocalcinosis in the left knee is noted on review of prior plain radiographs.   --Lack of crystals in synovial fluid does not rule out crystalline arthropathy especially since CPP crystals may be difficult to visualize.   --PIPs/DIPs, ankle effusions, right knee effusion had previously significantly improved with colchicine monotherapy.  --Today, CDAI is 5 indicating low disease activity on colchicine + methotrexate combination therapy.  Residual pain is due to mechanical MSK pain that includes hip bursitis, IT band syndrome, and knee osteoarthritis.    #Osteoporosis: No fracture history.  Risk factors include smoking, low calcium intake  -Reclast: 6/19/2024    High risk medication labs including CMP and CBC w/ diff reviewed from 6/2024. Results are stable. HBsAg, HBcAB total negative, HCV neg, IGRA neg in 3/2023  -patient does not drink etoh  -Sees dentist regularly.  Teeth are good.    Plan:  Physical therapy for the above mechanical issues    Get flu shot   Consider covid booster and RSV shot. If you get the RSV vaccine, make sure to do this separately.   -skip a dose of methotrexate following the vaccines.    Get TDAP    S/p April 10th Shngrix    Methotrexate increase from 10 pills weekly  -Friday night: 5 pills  -Saturday night: 5 pills    -Continue colchicine 0.6 mg twice daily    Prednisone 30 mg taper as needed for crystalline arthropathy flares    Repeat Reclast June 19, 2025.      Diagnoses and all orders for this visit:    Seronegative rheumatoid arthritis (HCC)  -     Physical Therapy Referral - External  -     Comp Metabolic Panel (14); Future  -     CBC With Differential With Platelet; Future  -     C-Reactive Protein; Future  -     Sed Rate, Westergren (Automated); Future  -     Comp Metabolic Panel (14); Future  -     CBC With Differential With Platelet; Future  -     Vitamin D; Future  -     methotrexate 2.5 MG Oral  Tab; Take 10 tablets (25 mg total) by mouth once a week.  -     colchicine 0.6 MG Oral Tab; Take 1 tablet (0.6 mg total) by mouth 2 (two) times daily.  -     folic acid 1 MG Oral Tab; Take 3 tablets (3 mg total) by mouth daily.    Greater trochanteric bursitis of both hips  -     Physical Therapy Referral - External    Iliotibial band syndrome of left side  -     Physical Therapy Referral - External    Iliotibial band syndrome of right side  -     Physical Therapy Referral - External    Bilateral primary osteoarthritis of knee  -     Physical Therapy Referral - External    Therapeutic drug monitoring  -     Physical Therapy Referral - External  -     Comp Metabolic Panel (14); Future  -     CBC With Differential With Platelet; Future  -     C-Reactive Protein; Future  -     Sed Rate, Westergren (Automated); Future  -     Comp Metabolic Panel (14); Future  -     CBC With Differential With Platelet; Future    Low vitamin D level  -     Vitamin D; Future    Disorder of bone, unspecified  -     Vitamin D; Future    Chondrocalcinosis  -     colchicine 0.6 MG Oral Tab; Take 1 tablet (0.6 mg total) by mouth 2 (two) times daily.  -     folic acid 1 MG Oral Tab; Take 3 tablets (3 mg total) by mouth daily.    Arthralgia, unspecified joint  -     colchicine 0.6 MG Oral Tab; Take 1 tablet (0.6 mg total) by mouth 2 (two) times daily.  -     folic acid 1 MG Oral Tab; Take 3 tablets (3 mg total) by mouth daily.    Gout, unspecified cause, unspecified chronicity, unspecified site  -     colchicine 0.6 MG Oral Tab; Take 1 tablet (0.6 mg total) by mouth 2 (two) times daily.  -     folic acid 1 MG Oral Tab; Take 3 tablets (3 mg total) by mouth daily.    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              Return in about 4 months (around 1/9/2025).      The above plan of care, diagnosis, orders, and follow-up were discussed with the patient. Questions related to this recommended plan of care were answered.    Thank you for referring this delightful patient to me. Please feel free to contact me with any questions.     This report was performed utilizing speech recognition software technology. Despite proofreading, speech recognition errors could escape detection. If a word or phrase is confusing or out of context, please do not hesitate to call for   clarification.       Kind regards      Isaias Olmedo MD  [facility]

## 2024-09-09 NOTE — PATIENT INSTRUCTIONS
Get flu shot   Consider covid booster and RSV shot. If you get the RSV vaccine, make sure to do this separately.   -skip a dose of methotrexate following the vaccines.

## 2024-11-07 ENCOUNTER — ORDER TRANSCRIPTION (OUTPATIENT)
Dept: PHYSICAL THERAPY | Facility: HOSPITAL | Age: 69
End: 2024-11-07

## 2024-11-07 DIAGNOSIS — M06.00 SERONEGATIVE RHEUMATOID ARTHRITIS (HCC): ICD-10-CM

## 2024-11-07 DIAGNOSIS — M70.61 GREATER TROCHANTERIC BURSITIS OF BOTH HIPS: Primary | ICD-10-CM

## 2024-11-07 DIAGNOSIS — I10 ESSENTIAL HYPERTENSION: ICD-10-CM

## 2024-11-07 DIAGNOSIS — Z51.81 THERAPEUTIC DRUG MONITORING: ICD-10-CM

## 2024-11-07 DIAGNOSIS — M70.62 GREATER TROCHANTERIC BURSITIS OF BOTH HIPS: Primary | ICD-10-CM

## 2024-11-07 DIAGNOSIS — M17.0 BILATERAL PRIMARY OSTEOARTHRITIS OF KNEE: ICD-10-CM

## 2024-11-07 DIAGNOSIS — M76.31 ILIOTIBIAL BAND SYNDROME OF RIGHT SIDE: ICD-10-CM

## 2024-11-07 DIAGNOSIS — M76.32 ILIOTIBIAL BAND SYNDROME OF LEFT SIDE: ICD-10-CM

## 2024-11-11 RX ORDER — IRBESARTAN 150 MG/1
150 TABLET ORAL DAILY
Qty: 90 TABLET | Refills: 3 | Status: SHIPPED | OUTPATIENT
Start: 2024-11-11

## 2024-11-11 NOTE — TELEPHONE ENCOUNTER
Refill passed per [facility] protocol.  Requested Prescriptions   Pending Prescriptions Disp Refills    IRBESARTAN 150 MG Oral Tab [Pharmacy Med Name: Irbesartan 150 MG Oral Tablet] 90 tablet 3     Sig: TAKE 1 TABLET BY MOUTH DAILY       Hypertension Medications Protocol Passed - 11/11/2024 10:59 AM        Passed - CMP or BMP in past 12 months        Passed - Last BP reading less than 140/90     BP Readings from Last 1 Encounters:   09/09/24 138/68               Passed - In person appointment or virtual visit in the past 12 mos or appointment in next 3 mos     Recent Outpatient Visits              2 months ago Seronegative rheumatoid arthritis (HCC)    [facility] [place]    Office Visit    3 months ago Acute otitis media, unspecified otitis media type    [facility], [address] [place] [address] Edita Lyn APRN    Office Visit    4 months ago Senile osteoporosis    [facility]    Office Visit    6 months ago Seronegative rheumatoid arthritis ([facility])    [facility] Isaias Mays MD    Office Visit    6 months ago CHEK2-related breast cancer ([facility])    [facility] Kayla Guerrero APRN    Office Visit          Future Appointments         Provider Department Appt Notes    In 3 weeks Kimberly Abel, PT [facility] [place] MEDICARE/MEDICARE PART A&B  AARP/AARP    In 3 weeks Kimberly Abel, PT [facility] [place]     In 4 weeks Kimberly Abel, LEVI [facility] [place]     In 1 month Dandre Rdz MD [facility]    In 1 month Kimberly Abel, PT [facility] [place]     In 1 month Kimberly Abel, PT [facility] [place]     In 1 month Kimberly Abel, PT [facility] [place]     In 2 months  Isaias Olmedo MD [facility], [facility] 4 mo                    Passed - EGFRCR or GFRNAA > 50     GFR Evaluation  EGFRCR: 99 , resulted on 9/9/2024             Future Appointments         Provider Department Appt Notes    In 3 weeks Kimberly Abel, PT [facility] [place] MEDICARE/MEDICARE PART A&B  AARP/AARP    In 3 weeks Kimberly Abel, PT [facility] [place]     In 4 weeks Kimberly Abel, PT [facility] [place]     In 1 month Dandre Rdz MD [facility], [facility] Cystoscopy    In 1 month Kimberly Abel, PT [facility] [place]     In 1 month Kimberly Abel, PT [facility] [place]     In 1 month Kimberly Abel, PT [facility] [place]     In 2 months Isaias Olmedo MD [facility] fu 4 mo          Recent Outpatient Visits              2 months ago Seronegative rheumatoid arthritis (HCC)    [facility] Isaias Olmedo MD    Office Visit    3 months ago Acute otitis media, unspecified otitis media type    [facility], [address] [place] [address] Edita Lyn APRN    Office Visit    4 months ago Senile [facility]    Office Visit    6 months ago Seronegative rheumatoid arthritis (HCC)    [facility] Isaias Olmedo MD    Office Visit    6 months ago CHEK2-related breast cancer (HCC)    [facility] Kayla Guerrero APRN    Office Visit

## 2024-11-12 ENCOUNTER — TELEPHONE (OUTPATIENT)
Dept: PHYSICAL THERAPY | Age: 69
End: 2024-11-12

## 2024-11-16 DIAGNOSIS — M06.00 SERONEGATIVE RHEUMATOID ARTHRITIS (HCC): Primary | ICD-10-CM

## 2024-11-18 RX ORDER — METHOTREXATE 2.5 MG/1
25 TABLET ORAL WEEKLY
Qty: 130 TABLET | Refills: 0 | Status: SHIPPED | OUTPATIENT
Start: 2024-11-18 | End: 2025-02-17

## 2024-11-18 NOTE — TELEPHONE ENCOUNTER
Last office visit: 9/9/24    Next Rheum Apt:1/13/2025 Isaias Olmedo MD    Last fill: 9/9/24    Labs:   Lab Results   Component Value Date    CREATSERUM 0.59 09/09/2024     11/15/2016    ALKPHO 68 09/09/2024    AST 14 09/09/2024    ALT 19 09/09/2024    BILT 1.0 09/09/2024    TP 6.9 09/09/2024    ALB 4.4 09/09/2024       Lab Results   Component Value Date    WBC 8.8 09/09/2024    HGB 13.6 09/09/2024    .0 09/09/2024    NEPRELIM 4.63 09/09/2024    NEPERCENT 52.6 09/09/2024    LYPERCENT 35.5 09/09/2024    NE 4.63 09/09/2024    LYMABS 3.13 09/09/2024

## 2024-11-19 NOTE — PROGRESS NOTES
LOWER EXTREMITY EVALUATION:     Diagnosis:   Greater trochanteric bursitis of both hips (M70.61,M70.62)  Iliotibial band syndrome of left side (M76.32)  Iliotibial band syndrome of right side (M76.31)  Bilateral primary osteoarthritis of knee (M17.0)  Seronegative rheumatoid arthritis (HCC) (M06.00)  Therapeutic drug monitoring (Z51.81)      Referring Provider: Isaias Olmedo  Date of Evaluation:    11/20/2024    Precautions:  Hearing impairment, RA Next MD visit:   none scheduled  Date of Surgery: n/a     PATIENT SUMMARY   Mónica Wu is a 69 year old female who presents to therapy today with complaints of (R) knee pain> (L) knee pain. Also feeling (R) lateral hip pain. States hx of the same knee issue 5 years ago which she had PT for and it helped some but the pain continued in intermittently since. She feels the pain all the time even at rest. The (R) knee was much more swollen but she was able to get it reduced with medicine. She is taking 2 arthritis Tylenol in the morning and 2 more at 5pm. On the bad day she will take 2 Advil in the middle of the day additionally. She is also on RA medicine. Everything is aggravating to (R) knee and she would like to walk across the parking lot with no problems. States recently the (L) knee started hurting her and she is not sure if its due to compensating for the (R) knee. She has a lot of trouble with stair negotiating and she tries to avoid it. She has trouble walking in the sore for grocery shopping.       Pt describes pain level current 4/10, at best 2/10, at worst 9/10.     Current functional limitations include performing house chores, baby sitting grand kids, stairs negotiating, bending/squatting, walking >10 minutes, kneeling and grocery shopping.     Mónica describes prior level of function sedentary due to the pain. Pt goals include to be able to go for a walk and go up and down stairs more fluidity.    Past medical history was reviewed with Mónica.  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         ASSESSMENT  Mónica presents to physical therapy evaluation with primary c/o BL knee pain R>L and (RR) lateral hip pain. The results of the objective tests and measures show impairments in soft tissue restrictions, knee A/PROM, significant LE weakness, and balance deficit.  Functional deficits include but are not limited to house chores, baby sitting grand kids, stairs negotiating, bending/squatting, walking >10 minutes, kneeling and grocery shopping. Signs and symptoms are consistent with diagnosis of (R) IT band syndrome,, (R) patellar tendonitis, (R) patellofemoral pain syndrome, BL knee OA. Pt and PT discussed evaluation findings, pathology, POC and HEP.  Pt voiced understanding and performs HEP correctly without reported pain. Skilled Physical Therapy is medically necessary to address the above impairments and reach functional goals.     OBJECTIVE:   Observation: increased BL tibial ER, lacking TKE on (R)   Palpation: TTP on (R) patellar tendon, IT band/TFL, quad, and lateral border of patella. (L) knee no major tenderness noted.   Sensation: WNL    AROM: (* denotes performed with pain)  Hip Knee   All WNL Flexion: R 125*; L 135*  Extension: R 0*; L +5        Accessory motion: BL PF joint WNL      Strength/MMT: (* denotes performed with pain)  Hip Knee Foot/Ankle   Flexion: R 3+/5; L 4-/5  Extension: R 3/5; L 3+/5  Abduction: R 3/5; L 3+/5  ER: R 3+/5; L 3+/5  IR: R 3+/5; L 4/5 Flexion: R 4-/5; L 4/5  Extension: R 3+/5*; L 4/5    DF: R 3+/5; L  4-/5  PF: R 4-/5; L 4-/5         Gait: pt ambulates on level ground with antalgia and increased tibial RE R>L.     Balance: SLS: R 10 sec, L 10 sec  Age appropriate norms for SLS: 60-70 y/o mean = 27.0 sec            Today’s Treatment and Response:   Pt education was provided on exam findings, treatment diagnosis, treatment plan, expectations, and prognosis. Pt was also provided recommendations for activity modifications, possible soreness after evaluation, modalities as needed [ice/heat], and importance of remaining active.  Patient was instructed in and issued a HEP for:     Access Code: [email or web address]  URL: [email or web address]  Date: 11/20/2024  Prepared by: Kimberly Abel    Exercises performed bilaterally (15 min)  - Sidelying Hip Abduction  - 1 x daily - 3 x weekly - 3 sets - 8 reps  - Clamshell  - 1 x daily - 3 x weekly - 2-3 sets - 8 reps  - Hooklying Hamstring Stretch with Strap  - 2-3 x daily - 7 x weekly - 3 sets - 30 sec hold    Manual tech: IASTM to (R) IT band x5  min (pt requested to discontinue due to poor tolerance and bruising when she has similar tech performed previously few years back)    Charges: PT Eval Moderate Complexity, TherEx:1 unit      Total Timed Treatment: 20 min     Total Treatment Time: 45 min     Based on clinical rationale and outcome measures, this evaluation involved Moderate Complexity decision making due to 1-2 personal factors/comorbidities, 3 body structures involved/activity limitations, and evolving symptoms including changing pain levels.  PLAN OF CARE:    Goals: (to be met in 16 visits)  Pt will improve knee extension ROM to +5 deg to allow proper heel strike during gait and terminal knee extension in stance   Pt will improve knee AROM flexion to >130 degrees to improve ability to perform don/doffing shoes   Pt will improve quad strength to 4/5 to ascend 1 flight of stairs reciprocally with 1 UE assist   Pt will increase hip and knee strength to grossly  4-/5 to be able to get up and down from the floor safely   Pt will demonstrate increased hip ER/ABD strength to 4-/5 to perform stepping and squatting activities without excessive femoral IR/ADD   Pt will improve SLS to >20s to improve safety with gait on uneven surfaces such as grass and gravel  Pt will be independent and compliant with comprehensive HEP to maintain progress achieved in PT      Frequency / Duration: Patient will be seen for 1-2 x/week or a total of 16 visits over a 90 day period. Treatment will include: Manual Therapy, Neuromuscular Re-education, Therapeutic Activities, Therapeutic Exercise, Home Exercise Program instruction, and Modalities to include: heat/ice    Education or treatment limitation: None  Rehab Potential:fair    LEFS Score  LEFS Score: (Patient-Rptd) 38.75 % (11/16/2024  9:21 AM)      Patient/Family/Caregiver was advised of these findings, precautions, and treatment options and has agreed to actively participate in planning and for this course of care.    Thank you for your referral. Please co-sign or sign and return this letter via fax as soon as possible to [phone]. If you have any questions, please contact me at Dept: [phone]    Sincerely,  Electronically signed by therapist: Kimberly Abel, PT  Physician's certification required: Yes  I certify the need for these services furnished under this plan of treatment and while under my care.    X___________________________________________________ Date____________________    Certification From: 11/18/2024  To:2/16/2025

## 2024-11-20 ENCOUNTER — OFFICE VISIT (OUTPATIENT)
Dept: PHYSICAL THERAPY | Age: 69
End: 2024-11-20
Attending: INTERNAL MEDICINE
Payer: MEDICARE

## 2024-11-20 DIAGNOSIS — M76.31 ILIOTIBIAL BAND SYNDROME OF RIGHT SIDE: ICD-10-CM

## 2024-11-20 DIAGNOSIS — M70.61 GREATER TROCHANTERIC BURSITIS OF BOTH HIPS: Primary | ICD-10-CM

## 2024-11-20 DIAGNOSIS — M06.00 SERONEGATIVE RHEUMATOID ARTHRITIS (HCC): ICD-10-CM

## 2024-11-20 DIAGNOSIS — M70.62 GREATER TROCHANTERIC BURSITIS OF BOTH HIPS: Primary | ICD-10-CM

## 2024-11-20 DIAGNOSIS — M76.32 ILIOTIBIAL BAND SYNDROME OF LEFT SIDE: ICD-10-CM

## 2024-11-20 DIAGNOSIS — M17.0 BILATERAL PRIMARY OSTEOARTHRITIS OF KNEE: ICD-10-CM

## 2024-11-20 DIAGNOSIS — Z51.81 THERAPEUTIC DRUG MONITORING: ICD-10-CM

## 2024-11-20 PROCEDURE — 97110 THERAPEUTIC EXERCISES: CPT | Performed by: PHYSICAL THERAPIST

## 2024-11-20 PROCEDURE — 97162 PT EVAL MOD COMPLEX 30 MIN: CPT | Performed by: PHYSICAL THERAPIST

## 2024-11-25 NOTE — PROGRESS NOTES
Diagnosis:   Greater trochanteric bursitis of both hips (M70.61,M70.62)  Iliotibial band syndrome of left side (M76.32)  Iliotibial band syndrome of right side (M76.31)  Bilateral primary osteoarthritis of knee (M17.0)  Seronegative rheumatoid arthritis (HCC) (M06.00)  Therapeutic drug monitoring (Z51.81)         Referring Provider: Isaias Olmedo  Date of Evaluation:    11/20/24    Precautions:   Hearing impairment, RA  Next MD visit:   none scheduled  Date of Surgery: n/a   Insurance Primary/Secondary: MEDICARE / AARP     # Auth Visits: N/A            Subjective: Pt reports her knee is not too bad today today. Slight discomfort on the lateral aspect of the knee. Every weekend her knees are hurting bad and she typically stays in. Yesterday was a bad day too and she rested and couldn't go to the book fair.     Pain: 1/10 (R) knee, 1/10 (L) knee      Objective:       AROM: (* denotes performed with pain)  Knee   Flexion: R 138*; L 135*  Extension: R +5; L +5           Strength/MMT: (* denotes performed with pain)  Hip Knee Foot/Ankle   Flexion: R 3+/5; L 4-/5  Extension: R 3/5; L 3+/5  Abduction: R 3/5; L 3+/5  ER: R 3+/5; L 3+/5  IR: R 3+/5; L 4/5 Flexion: R 4-/5; L 4/5  Extension: R 3+/5*; L 4/5    DF: R 3+/5; L 4-/5  PF: R 4-/5; L 4-/5         Assessment: Pt is presenting with reduced (R) knee symptoms and improved knee flexion and extension AROM.  She is introduced to TFL stretch to reduce tension of the TFL/IT band to reduce lateral knee pain. Focused tx on promoting quad and glut strength in non-weight bearing exercises. She tolerates all very well w/o complaints.       Goals:   (to be met in 16 visits)  Pt will improve knee extension ROM to +5 deg to allow proper heel strike during gait and terminal knee extension in stance   Pt will improve knee AROM flexion to >130 degrees to improve ability to perform don/doffing shoes   Pt will improve quad strength to 4/5 to ascend 1 flight of stairs reciprocally with 1 UE  assist   Pt will increase hip and knee strength to grossly 4-/5 to be able to get up and down from the floor safely   Pt will demonstrate increased hip ER/ABD strength to 4-/5 to perform stepping and squatting activities without excessive femoral IR/ADD   Pt will improve SLS to >20s to improve safety with gait on uneven surfaces such as grass and gravel  Pt will be independent and compliant with comprehensive HEP to maintain progress achieved in PT      Plan: Progress LE strength to tolerance  Date: 11/26/2024  TX#: 2/10 Date:                 TX#: 3/ Date:                 TX#: 4/ Date:                 TX#: 5/ Date:   Tx#: 6/   TherEx: 40 min  -NuStep: 6 min  -TFL stretch: 3x30 sec using strap  -supine HS stretch w/ strap: 3x30 sec  -heel slides: x20  -SLR w/ slight ER: x10  -SAQ: 2x10  -bridges: 2x10  -Hip add/abd iso: 5 sec x20 each  -s/l hip abd: 2x10  -clams: 2x10-Hold HEP      Hold  -standing hip abd + ext: 2x10  Lateral walk  -step up  LAQ  Heel raises                 HEP:     Access Code: [email or web address]  URL: [email or web address]  Date: 11/26/2024  Prepared by: Kimberly Abel    Exercises  - Hooklying Hamstring Stretch with Strap  - 2-3 x daily - 7 x weekly - 3 sets - 30 sec hold  - Supine ITB Stretch with Strap  - 2-3 x daily - 7 x weekly - 3 sets - 30 sec hold  - Sidelying Hip Abduction  - 1 x daily - 3 x weekly - 3 sets - 8 reps  - Clamshell  - 1 x daily - 3 x weekly - 2-3 sets - 8 reps    Charges: TherEx: 3 units         Total Timed Treatment: 40 min  Total Treatment Time: 40 min

## 2024-11-26 ENCOUNTER — OFFICE VISIT (OUTPATIENT)
Dept: PHYSICAL THERAPY | Age: 69
End: 2024-11-26
Attending: INTERNAL MEDICINE
Payer: MEDICARE

## 2024-11-26 PROCEDURE — 97110 THERAPEUTIC EXERCISES: CPT | Performed by: PHYSICAL THERAPIST

## 2024-11-27 NOTE — PROGRESS NOTES
Diagnosis:   Greater trochanteric bursitis of both hips (M70.61,M70.62)  Iliotibial band syndrome of left side (M76.32)  Iliotibial band syndrome of right side (M76.31)  Bilateral primary osteoarthritis of knee (M17.0)  Seronegative rheumatoid arthritis (HCC) (M06.00)  Therapeutic drug monitoring (Z51.81)         Referring Provider: Isaias Olmedo  Date of Evaluation:    11/20/24    Precautions:   Hearing impairment, RA  Next MD visit:   none scheduled  Date of Surgery: n/a   Insurance Primary/Secondary: MEDICARE / AARP     # Auth Visits: N/A            Subjective: Pt reports she did quite an amount of standing for thanksgiving cooking but this morning her knee does not seem too bad just yet. # hours after last tx session her knee was hurting more and she rested it. Next day her tailbone was hurting.     Pain: 1/10 (R) knee, 1/10 (L) knee      Objective:       AROM: (* denotes performed with pain)  Knee   Flexion: R 138*; L 135*  Extension: R +5; L +5           Strength/MMT: (* denotes performed with pain)  Hip Knee Foot/Ankle   Flexion: R 3+/5; L 4-/5  Extension: R 3/5; L 3+/5  Abduction: R 3/5; L 3+/5  ER: R 3+/5; L 3+/5  IR: R 3+/5; L 4/5 Flexion: R 4-/5; L 4/5  Extension: R 3+/5*; L 4/5    DF: R 3+/5; L 4-/5  PF: R 4-/5; L 4-/5         Assessment: Pt presents with low pain levels despite reports of increased knee pain post last tx session. She is progressed with resistance of SLR's and SAQ/LAQ to promote quad strength. She is given cuing to reduce valgus on bike. She tolerates tx well w/o major complaints of pain.     Goals:   (to be met in 16 visits)  Pt will improve knee extension ROM to +5 deg to allow proper heel strike during gait and terminal knee extension in stance   Pt will improve knee AROM flexion to >130 degrees to improve ability to perform don/doffing shoes   Pt will improve quad strength to 4/5 to ascend 1 flight of stairs reciprocally with 1 UE assist   Pt will increase hip and knee strength to  grossly 4-/5 to be able to get up and down from the floor safely   Pt will demonstrate increased hip ER/ABD strength to 4-/5 to perform stepping and squatting activities without excessive femoral IR/ADD   Pt will improve SLS to >20s to improve safety with gait on uneven surfaces such as grass and gravel  Pt will be independent and compliant with comprehensive HEP to maintain progress achieved in PT      Plan: Progress LE strength to tolerance  Date: 11/26/2024  TX#: 2/10 Date: 11/19/24                TX#: 3/10 Date:                 TX#: 4/ Date:                 TX#: 5/ Date:   Tx#: 6/   TherEx: 40 min  -NuStep: 6 min  -TFL stretch: 3x30 sec using strap  -supine HS stretch w/ strap: 3x30 sec  -heel slides: x20  -SLR w/ slight ER: x10  -SAQ: 2x10  -bridges: 2x10  -Hip add/abd iso: 5 sec x20 each  -s/l hip abd: 2x10  -clams: 2x10-Hold HEP      Hold  -standing hip abd + ext: 2x10  Lateral walk  -step up  LAQ  Heel raises    TherEx: 40 min  -NuStep: 6 min  -TFL stretch: 3x30 sec using strap  -supine HS stretch w/ strap: 3x30 sec  -heel slides: x20  -BL SLR w/ slight ER: 1#, 2x10  -BL SAQ: 1#, 2x10  -bridges w/ hip abd: 2x10  -Hip add/abd iso: 5 sec x20 each  -BL s/l hip abd: 2x10  -BL clams: YTB, 2x10  -HS curls: RTB, 2x10  -BL LAQ 1#, 2x10      Hold  -standing hip abd + ext: 2x10  -Lateral walk  -step up    Heel raises              HEP:     Access Code: [email or web address]  URL: [email or web address]  Date: 11/26/2024  Prepared by: Kimberly Abel    Exercises  - Hooklying Hamstring Stretch with Strap  - 2-3 x daily - 7 x weekly - 3 sets - 30 sec hold  - Supine ITB Stretch with Strap  - 2-3 x daily - 7 x weekly - 3 sets - 30 sec hold  - Sidelying Hip Abduction  - 1 x daily - 3 x weekly - 3 sets - 8 reps  - Clamshell  - 1 x daily - 3 x weekly - 2-3 sets - 8 reps    Charges: TherEx: 3 units         Total Timed Treatment: 40 min  Total Treatment Time: 40 min

## 2024-11-29 ENCOUNTER — OFFICE VISIT (OUTPATIENT)
Dept: PHYSICAL THERAPY | Age: 69
End: 2024-11-29
Attending: INTERNAL MEDICINE
Payer: MEDICARE

## 2024-11-29 PROCEDURE — 97110 THERAPEUTIC EXERCISES: CPT | Performed by: PHYSICAL THERAPIST

## 2024-12-04 ENCOUNTER — OFFICE VISIT (OUTPATIENT)
Dept: PHYSICAL THERAPY | Age: 69
End: 2024-12-04
Attending: INTERNAL MEDICINE
Payer: MEDICARE

## 2024-12-04 PROCEDURE — 97110 THERAPEUTIC EXERCISES: CPT | Performed by: PHYSICAL THERAPIST

## 2024-12-04 NOTE — PROGRESS NOTES
Diagnosis:   Greater trochanteric bursitis of both hips (M70.61,M70.62)  Iliotibial band syndrome of left side (M76.32)  Iliotibial band syndrome of right side (M76.31)  Bilateral primary osteoarthritis of knee (M17.0)  Seronegative rheumatoid arthritis (HCC) (M06.00)  Therapeutic drug monitoring (Z51.81)         Referring Provider: Isaias Olmedo  Date of Evaluation:    11/20/24    Precautions:   Hearing impairment, RA  Next MD visit:   none scheduled  Date of Surgery: n/a   Insurance Primary/Secondary: MEDICARE / AARP     # Auth Visits: N/A            Subjective: Pt reports she is not having much pain today. This weekend was not as bad as typical but Sunday it was painful.     Pain: 1/10 (R) knee, 1/10 (L) knee      Objective:       AROM: (* denotes performed with pain)  Knee   Flexion: R =L  Extension: R +5; L +5           Strength/MMT: (* denotes performed with pain)  Hip Knee Foot/Ankle   Flexion: R 3+/5; L 4-/5  Extension: R 3/5; L 3+/5  Abduction: R 3/5; L 3+/5  ER: R 3+/5; L 3+/5  IR: R 3+/5; L 4/5 Flexion: R 4-/5; L 4/5  Extension: R 3+/5*; L 4/5    DF: R 3+/5; L 4-/5  PF: R 4-/5; L 4-/5         Assessment: Pt is progressed with leg press, step ups and standing hip abductions with good tolerance to improve her tolerance to squatting and stair climbing. She requires heavy cuing to reduce compensation during step ups which improved with practice. She is given cuing and eduction on promoting femoral ER/ neutral positioning to reduce knee pain and TFL overcompensation.     Goals:   (to be met in 16 visits)  Pt will improve knee extension ROM to +5 deg to allow proper heel strike during gait and terminal knee extension in stance   Pt will improve knee AROM flexion to >130 degrees to improve ability to perform don/doffing shoes   Pt will improve quad strength to 4/5 to ascend 1 flight of stairs reciprocally with 1 UE assist   Pt will increase hip and knee strength to grossly 4-/5 to be able to get up and down from  the floor safely   Pt will demonstrate increased hip ER/ABD strength to 4-/5 to perform stepping and squatting activities without excessive femoral IR/ADD   Pt will improve SLS to >20s to improve safety with gait on uneven surfaces such as grass and gravel  Pt will be independent and compliant with comprehensive HEP to maintain progress achieved in PT      Plan: Progress LE strength to tolerance  Date: 11/26/2024  TX#: 2/10 Date: 11/19/24                TX#: 3/10 Date: 12/4/24                TX#: 4/10 Date:                 TX#: 5/ Date:   Tx#: 6/   TherEx: 40 min  -NuStep: 6 min  -TFL stretch: 3x30 sec using strap  -supine HS stretch w/ strap: 3x30 sec  -heel slides: x20  -SLR w/ slight ER: x10  -SAQ: 2x10  -bridges: 2x10  -Hip add/abd iso: 5 sec x20 each  -s/l hip abd: 2x10  -clams: 2x10-Hold HEP      Hold  -standing hip abd + ext: 2x10  Lateral walk  -step up  LAQ  Heel raises    TherEx: 40 min  -NuStep: 6 min  -TFL stretch: 3x30 sec using strap  -supine HS stretch w/ strap: 3x30 sec  -heel slides: x20  -BL SLR w/ slight ER: 1#, 2x10  -BL SAQ: 1#, 2x10  -bridges w/ hip abd: 2x10  -Hip add/abd iso: 5 sec x20 each  -BL s/l hip abd: 2x10  -BL clams: YTB, 2x10  -HS curls: RTB, 2x10  -BL LAQ 1#, 2x10      Hold  -standing hip abd + ext: 2x10  -Lateral walk  -step up    Heel raises  TherEx: 40 min  -NuStep: 6 min  -TFL stretch: 3x30 sec using strap  -supine HS stretch w/ strap: 3x30 sec  -heel slides: x20  -BL SLR w/ slight ER: 1#, 2x15  -BL SAQ: 1#, 2x15  -bridges w/ hip abd iso P-ring: 2x15  -BL s/l hip abd 1#: 2x15  -BL LAQ 1#, 2x10  -SL press: 50#, 2x10 each side      Hold  -standing hip abd + ext: 2x10  -Lateral walk  -step up  -Heel raises             HEP:     Access Code: [email or web address]  URL: [email or web address]  Date: 11/26/2024  Prepared by: Kimberly Abel    Exercises  - Hooklying Hamstring Stretch with Strap  - 2-3 x daily - 7 x weekly - 3 sets - 30 sec hold  - Supine ITB Stretch with Strap  -  2-3 x daily - 7 x weekly - 3 sets - 30 sec hold  - Sidelying Hip Abduction  - 1 x daily - 3 x weekly - 3 sets - 8 reps  - Clamshell  - 1 x daily - 3 x weekly - 2-3 sets - 8 reps    Charges: TherEx: 3 units         Total Timed Treatment: 40 min  Total Treatment Time: 40 min

## 2024-12-06 ENCOUNTER — OFFICE VISIT (OUTPATIENT)
Dept: PHYSICAL THERAPY | Age: 69
End: 2024-12-06
Attending: INTERNAL MEDICINE
Payer: MEDICARE

## 2024-12-06 PROCEDURE — 97110 THERAPEUTIC EXERCISES: CPT | Performed by: PHYSICAL THERAPIST

## 2024-12-06 NOTE — PROGRESS NOTES
Diagnosis:   Greater trochanteric bursitis of both hips (M70.61,M70.62)  Iliotibial band syndrome of left side (M76.32)  Iliotibial band syndrome of right side (M76.31)  Bilateral primary osteoarthritis of knee (M17.0)  Seronegative rheumatoid arthritis (HCC) (M06.00)  Therapeutic drug monitoring (Z51.81)         Referring Provider: Isaias Olmedo  Date of Evaluation:    11/20/24    Precautions:   Hearing impairment, RA  Next MD visit:   none scheduled  Date of Surgery: n/a   Insurance Primary/Secondary: MEDICARE / AARP     # Auth Visits: N/A            Subjective: Pt reports she had more pain at night time the day of having PT last time. It woke her up at night. The pain is located to the outside and front of the knee today.        Pain: 5/10 (R) knee, 2/10 (L) knee      Objective:       AROM: (* denotes performed with pain)  Knee   Flexion: R =L  Extension: R +5; L +5           Strength/MMT: (* denotes performed with pain)  Hip Knee Foot/Ankle   Flexion: R 3+/5; L 4-/5  Extension: R 3/5; L 3+/5  Abduction: R 3/5; L 3+/5  ER: R 3+/5; L 3+/5  IR: R 3+/5; L 4/5 Flexion: R 4-/5; L 4/5  Extension: R 3+/5*; L 4/5    DF: R 3+/5; L 4-/5  PF: R 4-/5; L 4-/5         Assessment: Pt progressed with increased pain since the night after last PT session. Kept strengthening exercises in non-weightbearing to prevent further aggravation of symptoms. She tolerates tx w/o complaints.   Goals:   (to be met in 16 visits)  Pt will improve knee extension ROM to +5 deg to allow proper heel strike during gait and terminal knee extension in stance   Pt will improve knee AROM flexion to >130 degrees to improve ability to perform don/doffing shoes   Pt will improve quad strength to 4/5 to ascend 1 flight of stairs reciprocally with 1 UE assist   Pt will increase hip and knee strength to grossly 4-/5 to be able to get up and down from the floor safely   Pt will demonstrate increased hip ER/ABD strength to 4-/5 to perform stepping and squatting  activities without excessive femoral IR/ADD   Pt will improve SLS to >20s to improve safety with gait on uneven surfaces such as grass and gravel  Pt will be independent and compliant with comprehensive HEP to maintain progress achieved in PT      Plan: Progress LE strength to tolerance  Date: 11/26/2024  TX#: 2/10 Date: 11/19/24                TX#: 3/10 Date: 12/4/24                TX#: 4/10 Date: 12/6/24                TX#: 5/10 Date:   Tx#: 6/   TherEx: 40 min  -NuStep: 6 min  -TFL stretch: 3x30 sec using strap  -supine HS stretch w/ strap: 3x30 sec  -heel slides: x20  -SLR w/ slight ER: x10  -SAQ: 2x10  -bridges: 2x10  -Hip add/abd iso: 5 sec x20 each  -s/l hip abd: 2x10  -clams: 2x10-Hold HEP      Hold  -standing hip abd + ext: 2x10  Lateral walk  -step up  LAQ  Heel raises    TherEx: 40 min  -NuStep: 6 min  -TFL stretch: 3x30 sec using strap  -supine HS stretch w/ strap: 3x30 sec  -heel slides: x20  -BL SLR w/ slight ER: 1#, 2x10  -BL SAQ: 1#, 2x10  -bridges w/ hip abd: 2x10  -Hip add/abd iso: 5 sec x20 each  -BL s/l hip abd: 2x10  -BL clams: YTB, 2x10  -HS curls: RTB, 2x10  -BL LAQ 1#, 2x10      Hold  -standing hip abd + ext: 2x10  -Lateral walk  -step up    Heel raises  TherEx: 40 min  -NuStep: 6 min  -TFL stretch: 3x30 sec using strap  -supine HS stretch w/ strap: 3x30 sec  -heel slides: x20  -BL SLR w/ slight ER: 1#, 2x15  -BL SAQ: 1#, 2x15  -bridges w/ hip abd iso P-ring: 2x15  -BL s/l hip abd 1#: 2x15  -BL LAQ 1#, 2x10  -SL press: 50#, 2x10 each side      Hold  -standing hip abd + ext: 2x10  -Lateral walk  -step up  -Heel raises  TherEx: 38 min  -NuStep: 6 min  -BL TFL stretch: 3x30 sec using strap  -BL supine HS stretch w/ strap: 3x30 sec  -heel slides: x20  -Hip add/abd iso: 5 sec x20 each  -BL SLR w/ slight ER: 1#, 2x15  -BL SAQ: 1#, 2x15  -bridges w/ hip abd iso P-ring: 2x15  -BL s/l hip abd 1#: 2x15  -S/l hip circles: 0#, 2x10 each leg         Hold  Prone leg raise  -standing hip abd + ext:  2x10  -Lateral walk  -step up  -Heel raises            HEP:     Access Code: [email or web address]  URL: [email or web address]  Date: 11/26/2024  Prepared by: Kimberly Abel    Exercises  - Hooklying Hamstring Stretch with Strap  - 2-3 x daily - 7 x weekly - 3 sets - 30 sec hold  - Supine ITB Stretch with Strap  - 2-3 x daily - 7 x weekly - 3 sets - 30 sec hold  - Sidelying Hip Abduction  - 1 x daily - 3 x weekly - 3 sets - 8 reps  - Clamshell  - 1 x daily - 3 x weekly - 2-3 sets - 8 reps    Charges: TherEx: 3 units         Total Timed Treatment: 38 min  Total Treatment Time: 38 min

## 2024-12-10 ENCOUNTER — OFFICE VISIT (OUTPATIENT)
Dept: PHYSICAL THERAPY | Age: 69
End: 2024-12-10
Attending: INTERNAL MEDICINE
Payer: MEDICARE

## 2024-12-10 ENCOUNTER — LAB ENCOUNTER (OUTPATIENT)
Dept: LAB | Age: 69
End: 2024-12-10
Attending: INTERNAL MEDICINE
Payer: MEDICARE

## 2024-12-10 DIAGNOSIS — M06.00 SERONEGATIVE RHEUMATOID ARTHRITIS (HCC): ICD-10-CM

## 2024-12-10 DIAGNOSIS — Z51.81 THERAPEUTIC DRUG MONITORING: ICD-10-CM

## 2024-12-10 LAB
ALBUMIN SERPL-MCNC: 4.1 G/DL (ref 3.2–4.8)
ALBUMIN/GLOB SERPL: 1.4 {RATIO} (ref 1–2)
ALP LIVER SERPL-CCNC: 75 U/L
ALT SERPL-CCNC: 19 U/L
ANION GAP SERPL CALC-SCNC: 7 MMOL/L (ref 0–18)
AST SERPL-CCNC: 17 U/L (ref ?–34)
BASOPHILS # BLD AUTO: 0.07 X10(3) UL (ref 0–0.2)
BASOPHILS NFR BLD AUTO: 0.9 %
BILIRUB SERPL-MCNC: 0.6 MG/DL (ref 0.2–1.1)
BUN BLD-MCNC: 10 MG/DL (ref 9–23)
CALCIUM BLD-MCNC: 9.7 MG/DL (ref 8.7–10.4)
CHLORIDE SERPL-SCNC: 108 MMOL/L (ref 98–112)
CO2 SERPL-SCNC: 26 MMOL/L (ref 21–32)
CREAT BLD-MCNC: 0.6 MG/DL
EGFRCR SERPLBLD CKD-EPI 2021: 98 ML/MIN/1.73M2 (ref 60–?)
EOSINOPHIL # BLD AUTO: 0.13 X10(3) UL (ref 0–0.7)
EOSINOPHIL NFR BLD AUTO: 1.6 %
ERYTHROCYTE [DISTWIDTH] IN BLOOD BY AUTOMATED COUNT: 14.2 %
FASTING STATUS PATIENT QL REPORTED: NO
GLOBULIN PLAS-MCNC: 2.9 G/DL (ref 2–3.5)
GLUCOSE BLD-MCNC: 85 MG/DL (ref 70–99)
HCT VFR BLD AUTO: 37.9 %
HGB BLD-MCNC: 12.7 G/DL
IMM GRANULOCYTES # BLD AUTO: 0.02 X10(3) UL (ref 0–1)
IMM GRANULOCYTES NFR BLD: 0.3 %
LYMPHOCYTES # BLD AUTO: 2.81 X10(3) UL (ref 1–4)
LYMPHOCYTES NFR BLD AUTO: 35.4 %
MCH RBC QN AUTO: 33.7 PG (ref 26–34)
MCHC RBC AUTO-ENTMCNC: 33.5 G/DL (ref 31–37)
MCV RBC AUTO: 100.5 FL
MONOCYTES # BLD AUTO: 0.71 X10(3) UL (ref 0.1–1)
MONOCYTES NFR BLD AUTO: 9 %
NEUTROPHILS # BLD AUTO: 4.19 X10 (3) UL (ref 1.5–7.7)
NEUTROPHILS # BLD AUTO: 4.19 X10(3) UL (ref 1.5–7.7)
NEUTROPHILS NFR BLD AUTO: 52.8 %
OSMOLALITY SERPL CALC.SUM OF ELEC: 290 MOSM/KG (ref 275–295)
PLATELET # BLD AUTO: 405 10(3)UL (ref 150–450)
POTASSIUM SERPL-SCNC: 4.2 MMOL/L (ref 3.5–5.1)
PROT SERPL-MCNC: 7 G/DL (ref 5.7–8.2)
RBC # BLD AUTO: 3.77 X10(6)UL
SODIUM SERPL-SCNC: 141 MMOL/L (ref 136–145)
WBC # BLD AUTO: 7.9 X10(3) UL (ref 4–11)

## 2024-12-10 PROCEDURE — 85025 COMPLETE CBC W/AUTO DIFF WBC: CPT

## 2024-12-10 PROCEDURE — 97110 THERAPEUTIC EXERCISES: CPT | Performed by: PHYSICAL THERAPIST

## 2024-12-10 PROCEDURE — 36415 COLL VENOUS BLD VENIPUNCTURE: CPT

## 2024-12-10 PROCEDURE — 80053 COMPREHEN METABOLIC PANEL: CPT

## 2024-12-10 NOTE — PROGRESS NOTES
Diagnosis:   Greater trochanteric bursitis of both hips (M70.61,M70.62)  Iliotibial band syndrome of left side (M76.32)  Iliotibial band syndrome of right side (M76.31)  Bilateral primary osteoarthritis of knee (M17.0)  Seronegative rheumatoid arthritis (HCC) (M06.00)  Therapeutic drug monitoring (Z51.81)         Referring Provider: Isaias Olmedo  Date of Evaluation:    11/20/24    Precautions:   Hearing impairment, RA  Next MD visit:   none scheduled  Date of Surgery: n/a   Insurance Primary/Secondary: MEDICARE / AARP     # Auth Visits: N/A            Subjective: Pt reports she had one bad day of pain over the weekend but she did her stretches and it seems to help. Feels better today.   Pain: 1/10 (R) knee, 1/10 (L) knee      Objective:       AROM: (* denotes performed with pain)  Knee   Flexion: R =L  Extension: R +5; L +5           Strength/MMT: (* denotes performed with pain)  Hip Knee Foot/Ankle   Flexion: R 3+/5; L 4-/5  Extension: R 3/5; L 3+/5  Abduction: R 3/5; L 3+/5  ER: R 3+/5; L 3+/5  IR: R 3+/5; L 4/5 Flexion: R 4-/5; L 4/5  Extension: R 3+/5*; L 4/5    DF: R 3+/5; L 4-/5  PF: R 4-/5; L 4-/5         Assessment: Pt able to be progressed with weight bearing standing hip abductions with good tolerance. She is also progressed with glut mad focused strengthening bent over table. She increases resistance on various NWB exercises with good tolerance. Avoided repetitive step ups and squats to not aggravate symptoms. Plan to resume stepping and leg press activities over time as she gains more glut strength.       Goals:   (to be met in 16 visits)  Pt will improve knee extension ROM to +5 deg to allow proper heel strike during gait and terminal knee extension in stance   Pt will improve knee AROM flexion to >130 degrees to improve ability to perform don/doffing shoes   Pt will improve quad strength to 4/5 to ascend 1 flight of stairs reciprocally with 1 UE assist   Pt will increase hip and knee strength to grossly  4-/5 to be able to get up and down from the floor safely   Pt will demonstrate increased hip ER/ABD strength to 4-/5 to perform stepping and squatting activities without excessive femoral IR/ADD   Pt will improve SLS to >20s to improve safety with gait on uneven surfaces such as grass and gravel  Pt will be independent and compliant with comprehensive HEP to maintain progress achieved in PT      Plan: Progress LE strength to tolerance  Date: 11/26/2024  TX#: 2/10 Date: 11/19/24                TX#: 3/10 Date: 12/4/24                TX#: 4/10 Date: 12/6/24                TX#: 5/10 Date: 12/10/24  Tx#: 6/10   TherEx: 40 min  -NuStep: 6 min  -TFL stretch: 3x30 sec using strap  -supine HS stretch w/ strap: 3x30 sec  -heel slides: x20  -SLR w/ slight ER: x10  -SAQ: 2x10  -bridges: 2x10  -Hip add/abd iso: 5 sec x20 each  -s/l hip abd: 2x10  -clams: 2x10-Hold HEP      Hold  -standing hip abd + ext: 2x10  Lateral walk  -step up  LAQ  Heel raises    TherEx: 40 min  -NuStep: 6 min  -TFL stretch: 3x30 sec using strap  -supine HS stretch w/ strap: 3x30 sec  -heel slides: x20  -BL SLR w/ slight ER: 1#, 2x10  -BL SAQ: 1#, 2x10  -bridges w/ hip abd: 2x10  -Hip add/abd iso: 5 sec x20 each  -BL s/l hip abd: 2x10  -BL clams: YTB, 2x10  -HS curls: RTB, 2x10  -BL LAQ 1#, 2x10      Hold  -standing hip abd + ext: 2x10  -Lateral walk  -step up    Heel raises  TherEx: 40 min  -NuStep: 6 min  -TFL stretch: 3x30 sec using strap  -supine HS stretch w/ strap: 3x30 sec  -heel slides: x20  -BL SLR w/ slight ER: 1#, 2x15  -BL SAQ: 1#, 2x15  -bridges w/ hip abd iso P-ring: 2x15  -BL s/l hip abd 1#: 2x15  -BL LAQ 1#, 2x10  -SL press: 50#, 2x10 each side      Hold  -standing hip abd + ext: 2x10  -Lateral walk  -step up  -Heel raises  TherEx: 38 min  -NuStep: 6 min  -BL TFL stretch: 3x30 sec using strap  -BL supine HS stretch w/ strap: 3x30 sec  -heel slides: x20  -Hip add/abd iso: 5 sec x20 each  -BL SLR w/ slight ER: 1#, 2x15  -BL SAQ: 1#,  2x15  -bridges w/ hip abd iso P-ring: 2x15  -BL s/l hip abd 1#: 2x15  -S/l hip circles: 0#, 2x10 each leg         Hold  Prone leg raise  -standing hip abd + ext: 2x10  -Lateral walk  -step up  -Heel raises  TherEx: 45 min  -NuStep: 6 min  -BL TFL stretch: 3x30 sec using strap  -BL supine HS stretch w/ strap: 3x30 sec  -BL SLR w/ slight ER: 1#, 2x20  -BL SAQ: 2#, 2x20  -bridges w/ hip abd iso P-ring: 2x20   -BL s/l hip abd 1#: 2x20  -S/l hip circles: 1#, 2x10 each leg   -bent over table donkey kicks: 2x20  -bent over leg ext knee straight: 2x20  -standing hip abduction: YTB, 2x10        Hold  -Lateral walk  -step up            HEP:     Access Code: [email or web address]  URL: [email or web address]  Date: 11/26/2024  Prepared by: Kimberly Abel    Exercises  - Hooklying Hamstring Stretch with Strap  - 2-3 x daily - 7 x weekly - 3 sets - 30 sec hold  - Supine ITB Stretch with Strap  - 2-3 x daily - 7 x weekly - 3 sets - 30 sec hold  - Sidelying Hip Abduction  - 1 x daily - 3 x weekly - 3 sets - 8 reps  - Clamshell  - 1 x daily - 3 x weekly - 2-3 sets - 8 reps    Charges: TherEx: 3 units         Total Timed Treatment: 45 min  Total Treatment Time: 45 min

## 2024-12-11 ENCOUNTER — PROCEDURE (OUTPATIENT)
Dept: SURGERY | Facility: CLINIC | Age: 69
End: 2024-12-11
Payer: MEDICARE

## 2024-12-11 DIAGNOSIS — R16.0 LIVER MASS: ICD-10-CM

## 2024-12-11 DIAGNOSIS — N30.90 CYSTITIS: ICD-10-CM

## 2024-12-11 DIAGNOSIS — C67.2 MALIGNANT NEOPLASM OF LATERAL WALL OF URINARY BLADDER (HCC): ICD-10-CM

## 2024-12-11 DIAGNOSIS — R31.0 GROSS HEMATURIA: Primary | ICD-10-CM

## 2024-12-11 LAB
APPEARANCE: CLEAR
BILIRUBIN: NEGATIVE
GLUCOSE (URINE DIPSTICK): NEGATIVE MG/DL
KETONES (URINE DIPSTICK): NEGATIVE MG/DL
LEUKOCYTES: NEGATIVE
MULTISTIX LOT#: NORMAL NUMERIC
NITRITE, URINE: NEGATIVE
OCCULT BLOOD: NEGATIVE
PH, URINE: 6 (ref 4.5–8)
PROTEIN (URINE DIPSTICK): NEGATIVE MG/DL
SPECIFIC GRAVITY: 1.01 (ref 1–1.03)
URINE-COLOR: YELLOW
UROBILINOGEN,SEMI-QN: 0.2 MG/DL (ref 0–1.9)

## 2024-12-11 PROCEDURE — 81003 URINALYSIS AUTO W/O SCOPE: CPT | Performed by: UROLOGY

## 2024-12-11 PROCEDURE — 52000 CYSTOURETHROSCOPY: CPT | Performed by: UROLOGY

## 2024-12-11 PROCEDURE — 99213 OFFICE O/P EST LOW 20 MIN: CPT | Performed by: UROLOGY

## 2024-12-11 RX ORDER — CIPROFLOXACIN 500 MG/1
500 TABLET, FILM COATED ORAL ONCE
Status: COMPLETED | OUTPATIENT
Start: 2024-12-11 | End: 2024-12-11

## 2024-12-11 RX ADMIN — CIPROFLOXACIN 500 MG: 500 TABLET, FILM COATED ORAL at 15:01:00

## 2024-12-11 NOTE — PROGRESS NOTES
HPI:     Mónica Wu is a 69 year old female with a PMH of HTN, breast ca.    Following for:  1. 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 Gross hematuria   3. Cystitis    PCP - Shane  LOV 6/12/2024    Presents for check-up, cysto, discuss next steps.  Has chronic right knee stiffness and pain.    She feels good. Appetite and energy are good. No frequency, urgency, hematuria, dysuria.    UA is negative    Reported ~ 8 oz water, > 100 oz coffee, 8 oz tea with light yellow urine. Now 40 water, 40 coffee, tea with light yellow urine.    UTI hx: none  Tobacco hx: > 40 pack years, 1 PPD  Kidney stone hx: none  Fam h/o  malignancy: sister kidney cancer (non-smoker)    Cysto today: s/p TUR on posterior wall with well-healed scar. No abnormalities. Cystitis has improved.    MRA 7/25/24: hepatic hemangioma and simple liver cysts   CTU 6/3/24: new 8 x 6 mm liver lesion, probable hemangioma but MR liver recommended for further eval  CT AP + IVC 2/22/23: bladder mass near right trigone (> 2 cm) with posterior bladder wall thickening    Have discussed that this puts the patient in the intermediate risk group for NMIBC.  We reviewed the NCCN guidelines for follow-up for intermediate risk NMIBC, which includes:  - cysto with urine cytology at 3, 6, 12, 18, 24 mo, then annually up to 5 y from diagnosis, then as indicated  - baseline upper tract imaging, then as indicated    Discussed options and will plan for MR liver protocol per Rads recs and would defer to PCP if any further w/u or f/u is required.    Recommend she continue to drink plenty of water to help with mild cystitis changes. Will plan for cytology today and cysto with urine cytology in 6 mo months.    PROCEDURE NOTE    PROCEDURE PERFORMED: Flexible Cystoscopy    After informed consent and urinalysis was obtained, patient was placed in the modified lithotomy position and all pressure points were padded. She was  prepped and draped in the usual sterile fashion using Betadine.   A 16 Filipino flexible scope was passed through the urethra, and the bladder was entered and examined in its entirety.     Findings: as noted above    The patient tolerated the procedure well, suffered no complications, was able to void spontaneously after completion of the procedure in the office, and left the office in good condition.    Shwetha-procedural antibiotics were given.  _____________________    Prior note:    Cysto today: large (~ 4 cm) tumor over the right ureteral orifice, papillary. Left ureteral orifice is normal, uninvolved. 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    High blood pressure    Pain in joints    Ankle and knee inflammation    Smoker    Visual impairment    glasses    Wears glasses      Past Surgical History:   Procedure Laterality Date    Cataract  24  & 24    Colonoscopy      Lumpectomy left Left 2016    DCIS    Diya biopsy stereo nodule 1 site left (cpt=19081)  2016    DCIS    Needle biopsy right Right 2015    US guided bx - benign          Radiation left Left 2017    lt lump with rad      Family History   Problem Relation Age of Onset    DCIS Self     Breast Cancer Self 61    Breast Cancer Mother 35    Heart Attack Father     Breast Cancer Sister 49    Cancer Sister     Cancer Sister 60        Kidney    Arthritis Sister     Heart Attack Brother     Cancer Maternal Grandmother         unknown      Social History:    Social History     Socioeconomic History    Marital status:    Occupational History    Occupation:      Comment: part time   Tobacco Use    Smoking status: Every Day     Current packs/day: 1.00     Average packs/day: 1 pack/day for 41.0 years (41.0 ttl pk-yrs)     Types: Cigarettes    Smokeless tobacco: Never   Vaping Use    Vaping status: Never Used   Substance and Sexual Activity    Alcohol use: No    Drug use: No    Sexual activity: Never   Other Topics Concern    Caffeine Concern No    Exercise No    Seat Belt No    Special Diet No    Stress Concern No    Weight Concern No   Social History Narrative    Lives in [place] with     Prefers treatment here in [place]     [employer]      [facility] [employer] [employer]    [facility]        Medications (Active prior to today's visit):  Current Outpatient Medications   Medication Sig Dispense Refill    methotrexate 2.5 MG Oral Tab Take 10 tablets (25 mg total) by mouth once a week. 130 tablet 0    Irbesartan 150 MG Oral Tab Take 1 tablet (150 mg total) by mouth daily. 90 tablet 3    colchicine 0.6 MG Oral Tab Take 1 tablet (0.6 mg total) by mouth 2 (two) times daily. 180 tablet 1    folic acid 1 MG Oral Tab Take 3 tablets (3 mg total) by mouth daily. 270 tablet 3    Calcium Carbonate Antacid (TUMS ULTRA 1000 OR)       Vitamin K 100 MCG Oral Tab       Vitamin D, Cholecalciferol, 50 MCG (2000 UT) Oral Cap       latanoprost 0.005 % Ophthalmic Solution INSTILL 1 DROP INTO AFFECTED EYES DAILY AT BEDTIME      acetaminophen 500 MG Oral Tab Take 1 tablet (500 mg total) by mouth every 6 (six) hours as needed for Pain.      ibuprofen 200 MG Oral Tab Take 1 tablet (200 mg total) by mouth every 6 (six) hours as needed for Pain.      Timolol Maleate 0.5 % Ophthalmic Solution       Multiple Vitamin (ONE-DAILY MULTI VITAMINS) Oral Tab Take 1 tablet by mouth daily.         Allergies:  Allergies   Allergen Reactions     Wellbutrin [Bupropion] OTHER (SEE COMMENTS)     Tremors/shakes         ROS:     A comprehensive 10 point review of systems was completed.  Pertinent positives and negatives noted in the the HPI.    PHYSICAL EXAM:     GENERAL APPEARANCE: well, developed, well nourished, in no acute distress  NEUROLOGIC: nonfocal, alert and oriented  HEAD: normocephalic, atraumatic  EYES: sclera non-icteric  EARS: hearing intact  ORAL CAVITY: mucosa moist  NECK/THYROID: no obvious goiter or masses  LUNGS: nonlabored breathing  ABDOMEN: soft, no obvious masses or tenderness  SKIN: no obvious rashes    : as noted above     ASSESSMENT/PLAN:   Diagnoses and all orders for this visit:    Gross hematuria  -     URINALYSIS, AUTO, W/O SCOPE  -     ciprofloxacin (Cipro) tab 500 mg    Cystitis    Malignant neoplasm of lateral wall of urinary bladder (HCC)  -     CYSTOURETHROSCOPY    Liver mass    - as noted above.    Thanks again for this consult.    Dandre Rdz MD, FACS  Urologist  [facility]  Office: [phone]

## 2024-12-12 LAB — NON GYNE INTERPRETATION: NEGATIVE

## 2024-12-13 ENCOUNTER — OFFICE VISIT (OUTPATIENT)
Dept: PHYSICAL THERAPY | Age: 69
End: 2024-12-13
Attending: INTERNAL MEDICINE
Payer: MEDICARE

## 2024-12-13 PROCEDURE — 97110 THERAPEUTIC EXERCISES: CPT | Performed by: PHYSICAL THERAPIST

## 2024-12-16 NOTE — PROGRESS NOTES
Diagnosis:   Greater trochanteric bursitis of both hips (M70.61,M70.62)  Iliotibial band syndrome of left side (M76.32)  Iliotibial band syndrome of right side (M76.31)  Bilateral primary osteoarthritis of knee (M17.0)  Seronegative rheumatoid arthritis (HCC) (M06.00)  Therapeutic drug monitoring (Z51.81)         Referring Provider: Isaias Olmedo  Date of Evaluation:    11/20/24    Precautions:   Hearing impairment, RA  Next MD visit:   none scheduled  Date of Surgery: n/a   Insurance Primary/Secondary: MEDICARE / AARP     # Auth Visits: N/A            Subjective: Pt reports she is feeling good today. This weekend just felt increase in pain for 3 hours but she figured it out.     Pain: 0/10 (R) knee, 0/10 (L) knee      Objective:       AROM: (* denotes performed with pain)  Knee   Flexion: R =L  Extension: R +5; L +5           Strength/MMT: (* denotes performed with pain)  Hip Knee Foot/Ankle   Flexion: R 3+/5; L 4-/5  Extension: R 3/5; L 3+/5  Abduction: R 3/5; L 3+/5  ER: R 3+/5; L 3+/5  IR: R 3+/5; L 4/5 Flexion: R 4-/5; L 4/5  Extension: R 3+/5*; L 4/5    DF: R 3+/5; L 4-/5  PF: R 4-/5; L 4-/5         Assessment: Pt tolerates increase in resistance on various quad  and glut focused exercises. She is introduced to side step for lateral glut strength to reduce TFL overcompensation during ADL's. She tolerates tx well w/o aggravation in symptoms.       Goals:   (to be met in 16 visits)  Pt will improve knee extension ROM to +5 deg to allow proper heel strike during gait and terminal knee extension in stance   Pt will improve knee AROM flexion to >130 degrees to improve ability to perform don/doffing shoes   Pt will improve quad strength to 4/5 to ascend 1 flight of stairs reciprocally with 1 UE assist   Pt will increase hip and knee strength to grossly 4-/5 to be able to get up and down from the floor safely   Pt will demonstrate increased hip ER/ABD strength to 4-/5 to perform stepping and squatting activities  without excessive femoral IR/ADD   Pt will improve SLS to >20s to improve safety with gait on uneven surfaces such as grass and gravel  Pt will be independent and compliant with comprehensive HEP to maintain progress achieved in PT      Plan: Progress LE strength to tolerance, progress to leg press, and sit to stands when appropriate  Date: 12/4/24                TX#: 4/10 Date: 12/6/24                TX#: 5/10 Date: 12/10/24  Tx#: 6/10 Date: 12/13/24  Tx#: 7/10 Date: 12/17/24  Tx#: 8/10   TherEx: 40 min  -NuStep: 6 min  -TFL stretch: 3x30 sec using strap  -supine HS stretch w/ strap: 3x30 sec  -heel slides: x20  -BL SLR w/ slight ER: 1#, 2x15  -BL SAQ: 1#, 2x15  -bridges w/ hip abd iso P-ring: 2x15  -BL s/l hip abd 1#: 2x15  -BL LAQ 1#, 2x10  -SL press: 50#, 2x10 each side      Hold  -standing hip abd + ext: 2x10  -Lateral walk  -step up  -Heel raises  TherEx: 38 min  -NuStep: 6 min  -BL TFL stretch: 3x30 sec using strap  -BL supine HS stretch w/ strap: 3x30 sec  -heel slides: x20  -Hip add/abd iso: 5 sec x20 each  -BL SLR w/ slight ER: 1#, 2x15  -BL SAQ: 1#, 2x15  -bridges w/ hip abd iso P-ring: 2x15  -BL s/l hip abd 1#: 2x15  -S/l hip circles: 0#, 2x10 each leg         Hold  Prone leg raise  -standing hip abd + ext: 2x10  -Lateral walk  -step up  -Heel raises  TherEx: 45 min  -NuStep: 6 min  -BL TFL stretch: 3x30 sec using strap  -BL supine HS stretch w/ strap: 3x30 sec  -BL SLR w/ slight ER: 1#, 2x20  -BL SAQ: 2#, 2x20  -bridges w/ hip abd iso P-ring: 2x20   -BL s/l hip abd 1#: 2x20  -S/l hip circles: 1#, 2x10 each leg   -bent over table donkey kicks: 2x20  -bent over leg ext knee straight: 2x20  -standing hip abduction: YTB, 2x10        Hold  -Lateral walk  -step up   TherEx: 42 min  -NuStep: 6 min  -BL TFL stretch: 3x30 sec using strap  -BL supine HS stretch w/ strap: 3x30 sec  -BL SLR w/ slight ER: 2#, 2x10  -BL SAQ: 3#, 2x10  -SL bridges Figure 4: 2x10   -BL s/l hip abd 2#: 2x10  -S/l hip circles: 1-2#, 2x10  each leg   -bent over fire hydrant: RTB, 2x10  -bent over table donkey kicks: RTB, 2x20  -bent over leg ext knee straight: RTB 2x20  -standing hip abduction: RTB, 2x10        Hold  -Lateral walk  -sit to stands  -leg press TherEx: 45 min  -NuStep: 6 min  -BL TFL stretch: 3x30 sec using strap  -BL supine HS stretch w/ strap: 3x30 sec  -BL SLR w/ slight ER: 2#, 2x10  -BL SAQ: 4#, 3x20  -SL bridges Figure 4: 2x10   -BL s/l hip abd 2#: 2x10  -S/l hip circles: 2#, 2x10 each leg   -bent over fire hydrant: GTB, 2x10  -bent over leg ext knee straight: GTB 2x20  -standing hip abduction: RTB, 2x10  -Lateral walk: GTB, 2x 20 feet each way          Hold  -sit to stands  -leg press          HEP:     Access Code: [email or web address]  URL: [email or web address]  Date: 11/26/2024  Prepared by: Kimberly Abel    Exercises  - Hooklying Hamstring Stretch with Strap  - 2-3 x daily - 7 x weekly - 3 sets - 30 sec hold  - Supine ITB Stretch with Strap  - 2-3 x daily - 7 x weekly - 3 sets - 30 sec hold  - Sidelying Hip Abduction  - 1 x daily - 3 x weekly - 3 sets - 8 reps  - Clamshell  - 1 x daily - 3 x weekly - 2-3 sets - 8 reps    Charges: TherEx: 3 units         Total Timed Treatment: 45 min  Total Treatment Time: 45 min

## 2024-12-17 ENCOUNTER — OFFICE VISIT (OUTPATIENT)
Dept: PHYSICAL THERAPY | Age: 69
End: 2024-12-17
Attending: INTERNAL MEDICINE
Payer: MEDICARE

## 2024-12-17 PROCEDURE — 97110 THERAPEUTIC EXERCISES: CPT | Performed by: PHYSICAL THERAPIST

## 2024-12-20 ENCOUNTER — OFFICE VISIT (OUTPATIENT)
Dept: PHYSICAL THERAPY | Age: 69
End: 2024-12-20
Attending: INTERNAL MEDICINE
Payer: MEDICARE

## 2024-12-20 PROCEDURE — 97110 THERAPEUTIC EXERCISES: CPT | Performed by: PHYSICAL THERAPIST

## 2024-12-20 NOTE — PROGRESS NOTES
Diagnosis:   Greater trochanteric bursitis of both hips (M70.61,M70.62)  Iliotibial band syndrome of left side (M76.32)  Iliotibial band syndrome of right side (M76.31)  Bilateral primary osteoarthritis of knee (M17.0)  Seronegative rheumatoid arthritis (HCC) (M06.00)  Therapeutic drug monitoring (Z51.81)         Referring Provider: Isaias Olmedo  Date of Evaluation:    11/20/24    Precautions:   Hearing impairment, RA  Next MD visit:   none scheduled  Date of Surgery: n/a   Insurance Primary/Secondary: MEDICARE / AARP     # Auth Visits: N/A            Subjective: Pt reports no pain currently. After last PT session was on her feet busy with ADL's for 12 hours and the knee did hurt for a few hours but able to recover with rest and stretching.     Pain: 0/10 (R) knee, 0/10 (L) knee      Objective:          Strength/MMT: (* denotes performed with pain)  Hip Knee Foot/Ankle   Flexion: R 3+/5; L 4-/5  Extension: R 3/5; L 3+/5  Abduction: R 3/5; L 3+/5  ER: R 3+/5; L 3+/5  IR: R 3+/5; L 4/5 Flexion: R 4-/5; L 4/5  Extension: R 3+/5*; L 4/5    DF: R 3+/5; L 4-/5  PF: R 4-/5; L 4-/5         Assessment: Pt able to increase repetitions on various mat table exercises demonstrating improving strength. She is introduced to sit to stands w/ band at knees to promote glut strength and thigh strength to improve squatting tasks and stair negotiation. She reports some pain in the knees with the exercises but tolerable.       Goals:   (to be met in 16 visits)  Pt will improve knee extension ROM to +5 deg to allow proper heel strike during gait and terminal knee extension in stance   Pt will improve knee AROM flexion to >130 degrees to improve ability to perform don/doffing shoes   Pt will improve quad strength to 4/5 to ascend 1 flight of stairs reciprocally with 1 UE assist   Pt will increase hip and knee strength to grossly 4-/5 to be able to get up and down from the floor safely   Pt will demonstrate increased hip ER/ABD strength to  4-/5 to perform stepping and squatting activities without excessive femoral IR/ADD   Pt will improve SLS to >20s to improve safety with gait on uneven surfaces such as grass and gravel  Pt will be independent and compliant with comprehensive HEP to maintain progress achieved in PT      Plan: Progress note next tx  Date: 12/4/24                TX#: 4/10 Date: 12/6/24                TX#: 5/10 Date: 12/10/24  Tx#: 6/10 Date: 12/13/24  Tx#: 7/10 Date: 12/17/24  Tx#: 8/10 Date: 12/20/24  Tx#: 9/10   TherEx: 40 min  -NuStep: 6 min  -TFL stretch: 3x30 sec using strap  -supine HS stretch w/ strap: 3x30 sec  -heel slides: x20  -BL SLR w/ slight ER: 1#, 2x15  -BL SAQ: 1#, 2x15  -bridges w/ hip abd iso P-ring: 2x15  -BL s/l hip abd 1#: 2x15  -BL LAQ 1#, 2x10  -SL press: 50#, 2x10 each side      Hold  -standing hip abd + ext: 2x10  -Lateral walk  -step up  -Heel raises  TherEx: 38 min  -NuStep: 6 min  -BL TFL stretch: 3x30 sec using strap  -BL supine HS stretch w/ strap: 3x30 sec  -heel slides: x20  -Hip add/abd iso: 5 sec x20 each  -BL SLR w/ slight ER: 1#, 2x15  -BL SAQ: 1#, 2x15  -bridges w/ hip abd iso P-ring: 2x15  -BL s/l hip abd 1#: 2x15  -S/l hip circles: 0#, 2x10 each leg         Hold  Prone leg raise  -standing hip abd + ext: 2x10  -Lateral walk  -step up  -Heel raises  TherEx: 45 min  -NuStep: 6 min  -BL TFL stretch: 3x30 sec using strap  -BL supine HS stretch w/ strap: 3x30 sec  -BL SLR w/ slight ER: 1#, 2x20  -BL SAQ: 2#, 2x20  -bridges w/ hip abd iso P-ring: 2x20   -BL s/l hip abd 1#: 2x20  -S/l hip circles: 1#, 2x10 each leg   -bent over table donkey kicks: 2x20  -bent over leg ext knee straight: 2x20  -standing hip abduction: YTB, 2x10        Hold  -Lateral walk  -step up   TherEx: 42 min  -NuStep: 6 min  -BL TFL stretch: 3x30 sec using strap  -BL supine HS stretch w/ strap: 3x30 sec  -BL SLR w/ slight ER: 2#, 2x10  -BL SAQ: 3#, 2x10  -SL bridges Figure 4: 2x10   -BL s/l hip abd 2#: 2x10  -S/l hip circles: 1-2#,  2x10 each leg   -bent over fire hydrant: RTB, 2x10  -bent over table donkey kicks: RTB, 2x20  -bent over leg ext knee straight: RTB 2x20  -standing hip abduction: RTB, 2x10        Hold  -Lateral walk  -sit to stands  -leg press TherEx: 45 min  -NuStep: 6 min  -BL TFL stretch: 3x30 sec using strap  -BL supine HS stretch w/ strap: 3x30 sec  -BL SLR w/ slight ER: 2#, 2x10  -BL SAQ: 4#, 3x20  -SL bridges Figure 4: 2x10   -BL s/l hip abd 2#: 2x10  -S/l hip circles: 2#, 2x10 each leg   -bent over fire hydrant: GTB, 2x10  -bent over leg ext knee straight: GTB 2x20  -standing hip abduction: RTB, 2x10  -Lateral walk: GTB, 2x 20 feet each way          Hold  -sit to stands  -leg press TherEx: 45 min  -NuStep: 6 min  -BL TFL stretch: 3x30 sec using strap  -BL supine HS stretch w/ strap: 3x30 sec  -BL SLR w/ slight ER: 2#, 2x15  -BL SAQ: 4#, 3x20  -SL bridges Figure 4: 2x15  -BL s/l hip abd 2#: 2x15  -S/l hip circles: 2#, 2x15 each leg   -bent over fire hydrant: GTB, 2x10  -bent over leg ext knee straight: GTB 2x20  -standing hip abduction: RTB, 2x10  -Lateral walk: RTB, 2x 20 feet each way  -sit to stands: RTB, x10  -leg press:            HEP:     Access Code: [email or web address]  URL: [email or web address]  Date: 11/26/2024  Prepared by: Kimberly Abel    Exercises  - Hooklying Hamstring Stretch with Strap  - 2-3 x daily - 7 x weekly - 3 sets - 30 sec hold  - Supine ITB Stretch with Strap  - 2-3 x daily - 7 x weekly - 3 sets - 30 sec hold  - Sidelying Hip Abduction  - 1 x daily - 3 x weekly - 3 sets - 8 reps  - Clamshell  - 1 x daily - 3 x weekly - 2-3 sets - 8 reps    Charges: TherEx: 3 units         Total Timed Treatment: 45 min  Total Treatment Time: 45 min

## 2024-12-26 ENCOUNTER — OFFICE VISIT (OUTPATIENT)
Dept: PHYSICAL THERAPY | Age: 69
End: 2024-12-26
Attending: INTERNAL MEDICINE
Payer: MEDICARE

## 2024-12-26 PROCEDURE — 97110 THERAPEUTIC EXERCISES: CPT | Performed by: PHYSICAL THERAPIST

## 2024-12-26 NOTE — PROGRESS NOTES
Progress Summary  Pt has attended 10 visits in Physical Therapy.    Diagnosis:   Greater trochanteric bursitis of both hips (M70.61,M70.62)  Iliotibial band syndrome of left side (M76.32)  Iliotibial band syndrome of right side (M76.31)  Bilateral primary osteoarthritis of knee (M17.0)  Seronegative rheumatoid arthritis (HCC) (M06.00)  Therapeutic drug monitoring (Z51.81)         Referring Provider: Isaias Olmedo Date of Evaluation:    11/20/24    Precautions:   Hearing impairment, RA  Next MD visit:   none scheduled  Date of Surgery: n/a   Insurance Primary/Secondary: MEDICARE / AARP     # Auth Visits: N/A            Subjective: Pt reports mild pain right now. She states her pain was elevated the last 2 days due to standing and preparing for the holidays. Since PT she reports it is easier to stand and walk through a grocery store. Her tolerance to ADL's has improved and no longer needs to use her hands to get on/off from a toilet seat. She would like to still improve her ability to negotiate stairs, walk faster to catch up to her 1 year old grandchild, and perform squatting.      Pain: currently 1/10 (R) knee, 0/10 (L) knee, (at worst 4/10)       Objective:       Palpation: Mild TTP on IT band/TFL and lateral border of patella. (L) knee no major tenderness noted.        AROM: (* denotes performed with pain)  Knee   Flexion: R 136; L 137  Extension: R +5; L +5              Strength/MMT: (* denotes performed with pain)  Hip Knee Foot/Ankle   Flexion: R 4-/5; L 4-/5  Extension: R 4-/5; L 4/5  Abduction: R 4-/5; L 4-/5  ER: R 3+/5; L 3+/5  IR: R 4-/5; L 4/5 Flexion: R 4/5; L 4/5  Extension: R 4/5; L 4/5    DF: R 4-/5; L 4-/5  PF: R 4-/5; L 4-/5            Gait: pt ambulates on level ground with antalgia and increased tibial ER R>L.      Balance: SLS: R 5 sec, L 5 sec  Age appropriate norms for SLS: 60-70 y/o mean = 27.0 sec                                                           Assessment: Pt describes and  demonstrates improved function during ADL's and reduced pain since IE. Pt has demonstrated reduced tenderness to palpation at BL IT bands and patellar tendons. She has also demonstrated normalized and pain free B knee A/PROM, improved strength, and gait mechanics. These objective gains have improved her tolerance to prolonged standing, improved ability to walk longer distances in the community, and sit to stand transfers. She continues to demonstrate mild STR's of the BL IT bands, LE weakness, and major balance deficits. These objective impairments limit her ability to walk faster speeds to catch up to her grandchildren, walk over uneven surfaces, squatting, floor to stand transfers, and negotiate stairs. She also demonstrates significant impairment in ability to balance on one foot which places her at higher risk of falls. She would benefit form continued skilled PT to improve functional activities and reduce fall risk.       Goals:   (to be met in 16 visits)  Pt will improve knee extension ROM to +5 deg to allow proper heel strike during gait and terminal knee extension in stance-MET   Pt will improve knee AROM flexion to >130 degrees to improve ability to perform don/doffing shoes-MET  Pt will improve quad strength to 4/5 to ascend 1 flight of stairs reciprocally with 1 UE assist-Progressing    Pt will increase hip and knee strength to grossly 4/5 to be able to get up and down from the floor safely -Progressing  Pt will demonstrate increased hip ER/ABD strength to 4/5 to perform stepping and squatting activities without excessive femoral IR/ADD -Progressing  Pt will improve SLS to >20s to improve safety with gait on uneven surfaces such as grass and gravel-Not Met  Pt will be independent and compliant with comprehensive HEP to maintain progress achieved in PT      LEFS Score  LEFS Score: (Patient-Rptd) 38.75 % (11/16/2024  9:21 AM)    Post LEFS Score  Post LEFS Score: 51.25 % (12/26/2024 10:14 AM)    12.5 %  improvement    Plan: Continue skilled Physical Therapy 1-2 x/week or a total of 10 visits over a 90 day period. Treatment will include: ThereEx, neuro re-ead,  manual tech, therapeutic act        Patient/Family/Caregiver was advised of these findings, precautions, and treatment options and has agreed to actively participate in planning and for this course of care.    Thank you for your referral. If you have any questions, please contact me at Dept: [phone].    Sincerely,  Electronically signed by therapist: Kimberly Abel PT     Physician's certification required:  Yes  Please co-sign or sign and return this letter via fax as soon as possible to [phone].   I certify the need for these services furnished under this plan of treatment and while under my care.    X___________________________________________________ Date____________________    Certification From: 12/26/2024  To:3/26/2025    Date: 12/6/24                TX#: 5/10 Date: 12/10/24  Tx#: 6/10 Date: 12/13/24  Tx#: 7/10 Date: 12/17/24  Tx#: 8/10 Date: 12/20/24  Tx#: 9/10 Date: 12/26/24  Tx#: 10/20   TherEx: 38 min  -NuStep: 6 min  -BL TFL stretch: 3x30 sec using strap  -BL supine HS stretch w/ strap: 3x30 sec  -heel slides: x20  -Hip add/abd iso: 5 sec x20 each  -BL SLR w/ slight ER: 1#, 2x15  -BL SAQ: 1#, 2x15  -bridges w/ hip abd iso P-ring: 2x15  -BL s/l hip abd 1#: 2x15  -S/l hip circles: 0#, 2x10 each leg         Hold  Prone leg raise  -standing hip abd + ext: 2x10  -Lateral walk  -step up  -Heel raises  TherEx: 45 min  -NuStep: 6 min  -BL TFL stretch: 3x30 sec using strap  -BL supine HS stretch w/ strap: 3x30 sec  -BL SLR w/ slight ER: 1#, 2x20  -BL SAQ: 2#, 2x20  -bridges w/ hip abd iso P-ring: 2x20   -BL s/l hip abd 1#: 2x20  -S/l hip circles: 1#, 2x10 each leg   -bent over table donkey kicks: 2x20  -bent over leg ext knee straight: 2x20  -standing hip abduction: YTB, 2x10        Hold  -Lateral walk  -step up   TherEx: 42 min  -NuStep: 6  min  -BL TFL stretch: 3x30 sec using strap  -BL supine HS stretch w/ strap: 3x30 sec  -BL SLR w/ slight ER: 2#, 2x10  -BL SAQ: 3#, 2x10  -SL bridges Figure 4: 2x10   -BL s/l hip abd 2#: 2x10  -S/l hip circles: 1-2#, 2x10 each leg   -bent over fire hydrant: RTB, 2x10  -bent over table donkey kicks: RTB, 2x20  -bent over leg ext knee straight: RTB 2x20  -standing hip abduction: RTB, 2x10        Hold  -Lateral walk  -sit to stands  -leg press TherEx: 45 min  -NuStep: 6 min  -BL TFL stretch: 3x30 sec using strap  -BL supine HS stretch w/ strap: 3x30 sec  -BL SLR w/ slight ER: 2#, 2x10  -BL SAQ: 4#, 3x20  -SL bridges Figure 4: 2x10   -BL s/l hip abd 2#: 2x10  -S/l hip circles: 2#, 2x10 each leg   -bent over fire hydrant: GTB, 2x10  -bent over leg ext knee straight: GTB 2x20  -standing hip abduction: RTB, 2x10  -Lateral walk: GTB, 2x 20 feet each way          Hold  -sit to stands  -leg press TherEx: 45 min  -NuStep: 6 min  -BL TFL stretch: 3x30 sec using strap  -BL supine HS stretch w/ strap: 3x30 sec  -BL SLR w/ slight ER: 2#, 2x15  -BL SAQ: 4#, 3x20  -SL bridges Figure 4: 2x15  -BL s/l hip abd 2#: 2x15  -S/l hip circles: 2#, 2x15 each leg   -bent over fire hydrant: GTB, 2x10  -bent over leg ext knee straight: GTB 2x20  -standing hip abduction: RTB, 2x10  -Lateral walk: RTB, 2x 20 feet each way  -sit to stands: RTB, x10  -leg press:  TherEx: 53 min  -NuStep: 6 min  -re-assessment  -BL TFL stretch: 3x30 sec using strap  -BL supine HS stretch w/ strap: 3x30 sec  -BL SLR w/ slight ER: 2#, 2x20  -BL LAQ: 5#, 2x20  -SL bridges Figure 4: 2x15-hold  -BL s/l hip abd 2#: 2x20  -S/l hip circles: 2#, 2x20 each leg   -bent over leg ext knee straight: GTB 2x20  -standing hip abduction: GTB, 2x10  -Lateral walk: GTB, 2x 20 feet each way  -sit to stands: RTB, [id number]  -SL press: 50#, 2x10 each           HEP:     Access Code: [email or web address]  URL: [email or web address]  Date: 12/26/2024  Prepared by: Kimberly  Bolor    Exercises  - Hooklying Hamstring Stretch with Strap  - 2-3 x daily - 7 x weekly - 3 sets - 30 sec hold  - Supine ITB Stretch with Strap  - 2-3 x daily - 7 x weekly - 3 sets - 30 sec hold  - Sidelying Hip Abduction  - 1 x daily - 2 x weekly - 3 sets - 10 reps  - Sidelying Hip Circles  - 1 x daily - 2 x weekly - 3 sets - 10 reps  - Supine Active Straight Leg Raise  - 1 x daily - 2 x weekly - 3 sets - 10 reps  - Clamshell with Resistance  - 1 x daily - 2 x weekly - 3 sets - 10 reps  - Figure 4 Bridge  - 1 x daily - 2 x weekly - 3 sets - 10 reps  - Side Stepping with Resistance at Ankles  - 1 x daily - 2 x weekly - 3 sets - 10 reps  - Hip Abduction with Resistance Loop  - 1 x daily - 2 x weekly - 3 sets - 10 reps  - Standing 3-Way Leg Reach with Resistance at Ankles and Counter Support  - 1 x daily - 2 x weekly - 3 sets - 10 reps  - Standing Diagonal Hip Extension and External Rotation  - 1 x daily - 2 x weekly - 3 sets - 10 reps    Charges: TherEx: 4 units         Total Timed Treatment: 53 min  Total Treatment Time: 53 min

## 2024-12-31 ENCOUNTER — OFFICE VISIT (OUTPATIENT)
Dept: PHYSICAL THERAPY | Age: 69
End: 2024-12-31
Attending: INTERNAL MEDICINE
Payer: MEDICARE

## 2024-12-31 PROCEDURE — 97110 THERAPEUTIC EXERCISES: CPT | Performed by: PHYSICAL THERAPIST

## 2024-12-31 NOTE — PROGRESS NOTES
Diagnosis:   Greater trochanteric bursitis of both hips (M70.61,M70.62)  Iliotibial band syndrome of left side (M76.32)  Iliotibial band syndrome of right side (M76.31)  Bilateral primary osteoarthritis of knee (M17.0)  Seronegative rheumatoid arthritis (HCC) (M06.00)  Therapeutic drug monitoring (Z51.81)         Referring Provider: Isaias Olmedo Date of Evaluation:    11/20/24    Precautions:   Hearing impairment, RA  Next MD visit:   none scheduled  Date of Surgery: n/a   Insurance Primary/Secondary: MEDICARE / AARP     # Auth Visits: N/A            Subjective: Pt reports aggravated symptoms for 3 days and today is finally better.       Pain: currently 0.5/10 (R) knee      Objective:     Observation: continued cuing provided to prevent valgus at knees during exercises     Balance: SLS: R 5 sec, L 5 sec  Age appropriate norms for SLS: 60-68 y/o mean = 27.0 sec                                                           Assessment: Pt was progressed with difficulty of SL bridge and introduced to side plank clams and swiss ball bridges to continue to build glut strength in non- weight bearing non-aggravating positions. Performed sit to stands from a higher surface and reduced depth on leg press to prevent aggravation of symptoms.       Goals:   (to be met in 16 visits)  Pt will improve knee extension ROM to +5 deg to allow proper heel strike during gait and terminal knee extension in stance-MET   Pt will improve knee AROM flexion to >130 degrees to improve ability to perform don/doffing shoes-MET  Pt will improve quad strength to 4/5 to ascend 1 flight of stairs reciprocally with 1 UE assist-Progressing    Pt will increase hip and knee strength to grossly 4/5 to be able to get up and down from the floor safely -Progressing  Pt will demonstrate increased hip ER/ABD strength to 4/5 to perform stepping and squatting activities without excessive femoral IR/ADD -Progressing  Pt will improve SLS to >20s to improve safety  with gait on uneven surfaces such as grass and gravel-Not Met  Pt will be independent and compliant with comprehensive HEP to maintain progress achieved in PT        Plan: Continue skilled Physical Therapy 1-2 x/week or a total of 10 visits over a 90 day period. Treatment will include: ThereEx, neuro re-ead,  manual tech, therapeutic act        Progress strength and stability as tolerated      Date: 12/6/24                TX#: 5/10 Date: 12/10/24  Tx#: 6/10 Date: 12/13/24  Tx#: 7/10 Date: 12/17/24  Tx#: 8/10 Date: 12/20/24  Tx#: 9/10 Date: 12/26/24  Tx#: 10/20 Date: 12/31/24  Tx#: 11/20   TherEx: 38 min  -NuStep: 6 min  -BL TFL stretch: 3x30 sec using strap  -BL supine HS stretch w/ strap: 3x30 sec  -heel slides: x20  -Hip add/abd iso: 5 sec x20 each  -BL SLR w/ slight ER: 1#, 2x15  -BL SAQ: 1#, 2x15  -bridges w/ hip abd iso P-ring: 2x15  -BL s/l hip abd 1#: 2x15  -S/l hip circles: 0#, 2x10 each leg         Hold  Prone leg raise  -standing hip abd + ext: 2x10  -Lateral walk  -step up  -Heel raises  TherEx: 45 min  -NuStep: 6 min  -BL TFL stretch: 3x30 sec using strap  -BL supine HS stretch w/ strap: 3x30 sec  -BL SLR w/ slight ER: 1#, 2x20  -BL SAQ: 2#, 2x20  -bridges w/ hip abd iso P-ring: 2x20   -BL s/l hip abd 1#: 2x20  -S/l hip circles: 1#, 2x10 each leg   -bent over table donkey kicks: 2x20  -bent over leg ext knee straight: 2x20  -standing hip abduction: YTB, 2x10        Hold  -Lateral walk  -step up   TherEx: 42 min  -NuStep: 6 min  -BL TFL stretch: 3x30 sec using strap  -BL supine HS stretch w/ strap: 3x30 sec  -BL SLR w/ slight ER: 2#, 2x10  -BL SAQ: 3#, 2x10  -SL bridges Figure 4: 2x10   -BL s/l hip abd 2#: 2x10  -S/l hip circles: 1-2#, 2x10 each leg   -bent over fire hydrant: RTB, 2x10  -bent over table donkey kicks: RTB, 2x20  -bent over leg ext knee straight: RTB 2x20  -standing hip abduction: RTB, 2x10        Hold  -Lateral walk  -sit to stands  -leg press TherEx: 45 min  -NuStep: 6 min  -BL TFL  stretch: 3x30 sec using strap  -BL supine HS stretch w/ strap: 3x30 sec  -BL SLR w/ slight ER: 2#, 2x10  -BL SAQ: 4#, 3x20  -SL bridges Figure 4: 2x10   -BL s/l hip abd 2#: 2x10  -S/l hip circles: 2#, 2x10 each leg   -bent over fire hydrant: GTB, 2x10  -bent over leg ext knee straight: GTB 2x20  -standing hip abduction: RTB, 2x10  -Lateral walk: GTB, 2x 20 feet each way          Hold  -sit to stands  -leg press TherEx: 45 min  -NuStep: 6 min  -BL TFL stretch: 3x30 sec using strap  -BL supine HS stretch w/ strap: 3x30 sec  -BL SLR w/ slight ER: 2#, 2x15  -BL SAQ: 4#, 3x20  -SL bridges Figure 4: 2x15  -BL s/l hip abd 2#: 2x15  -S/l hip circles: 2#, 2x15 each leg   -bent over fire hydrant: GTB, 2x10  -bent over leg ext knee straight: GTB 2x20  -standing hip abduction: RTB, 2x10  -Lateral walk: RTB, 2x 20 feet each way  -sit to stands: RTB, x10  -leg press:  TherEx: 53 min  -NuStep: 6 min  -re-assessment  -BL TFL stretch: 3x30 sec using strap  -BL supine HS stretch w/ strap: 3x30 sec  -BL SLR w/ slight ER: 2#, 2x20  -BL LAQ: 5#, 2x20  -SL bridges Figure 4: 2x15-hold  -BL s/l hip abd 2#: 2x20  -S/l hip circles: 2#, 2x20 each leg   -bent over leg ext knee straight: GTB 2x20  -standing hip abduction: GTB, 2x10  -Lateral walk: GTB, 2x 20 feet each way  -sit to stands: RTB, [id number]  -SL press: 50#, 2x10 each TherEx: 45 min  -NuStep: 6 min  -BL TFL stretch: 3x30 sec using strap  -BL supine HS stretch w/ strap: 3x30 sec  -Heel slides: x20  -BL SLR w/ slight ER: 2#, 2x20  -BL LAQ: 5#, 2x20  -SL bridges w/ SLR: 2x20  -RSB bridges: knees extended x10, HS curls x10 (painful on (L))  -S/l hip circles: 2#, 2x20 each leg   -side plank lift w/ clam: RTB, 2x10   -bent over leg ext knee straight: GTB 2x20  -standing hip abduction: GTB, 2x10  -Lateral walk: GTB, 2x 20 feet each way  -sit to stands on airex: RTB, x10  -SL press: 50#, 2x10 each            HEP:     Access Code: [email or web address]  URL: [email or web address]  Date:  12/26/2024  Prepared by: Kimberly Abel    Exercises  - Hooklying Hamstring Stretch with Strap  - 2-3 x daily - 7 x weekly - 3 sets - 30 sec hold  - Supine ITB Stretch with Strap  - 2-3 x daily - 7 x weekly - 3 sets - 30 sec hold  - Sidelying Hip Abduction  - 1 x daily - 2 x weekly - 3 sets - 10 reps  - Sidelying Hip Circles  - 1 x daily - 2 x weekly - 3 sets - 10 reps  - Supine Active Straight Leg Raise  - 1 x daily - 2 x weekly - 3 sets - 10 reps  - Clamshell with Resistance  - 1 x daily - 2 x weekly - 3 sets - 10 reps  - Figure 4 Bridge  - 1 x daily - 2 x weekly - 3 sets - 10 reps  - Side Stepping with Resistance at Ankles  - 1 x daily - 2 x weekly - 3 sets - 10 reps  - Hip Abduction with Resistance Loop  - 1 x daily - 2 x weekly - 3 sets - 10 reps  - Standing 3-Way Leg Reach with Resistance at Ankles and Counter Support  - 1 x daily - 2 x weekly - 3 sets - 10 reps  - Standing Diagonal Hip Extension and External Rotation  - 1 x daily - 2 x weekly - 3 sets - 10 reps    Charges: TherEx: 3 units         Total Timed Treatment: 45 min  Total Treatment Time: 45 min

## 2025-01-07 ENCOUNTER — OFFICE VISIT (OUTPATIENT)
Dept: PHYSICAL THERAPY | Age: 70
End: 2025-01-07
Attending: INTERNAL MEDICINE
Payer: MEDICARE

## 2025-01-07 PROCEDURE — 97110 THERAPEUTIC EXERCISES: CPT | Performed by: PHYSICAL THERAPIST

## 2025-01-07 NOTE — PROGRESS NOTES
Diagnosis:   Greater trochanteric bursitis of both hips (M70.61,M70.62)  Iliotibial band syndrome of left side (M76.32)  Iliotibial band syndrome of right side (M76.31)  Bilateral primary osteoarthritis of knee (M17.0)  Seronegative rheumatoid arthritis (HCC) (M06.00)  Therapeutic drug monitoring (Z51.81)         Referring Provider: Isaias Olmedo Date of Evaluation:    11/20/24    Precautions:   Hearing impairment, RA  Next MD visit:   none scheduled  Date of Surgery: n/a   Insurance Primary/Secondary: MEDICARE / [employer]     # Auth Visits: N/A            Subjective: Pt reports knees feel fine but she takes tylenol 2x/day to keep the pain under control. Every day she wakes up with pain.     Pain: currently 1/10 (R) knee pain, mornings >5/10 without pain medicine.       Objective:     Observation: continued cuing provided to prevent valgus at knees during exercises     Balance: SLS: R 5 sec, L 5 sec  Age appropriate norms for SLS: 60-70 y/o mean = 27.0 sec                                                           Assessment: Pt able to progress to increased resistance on supine and side lying leg raises to build knee stability and proximal hip strengthening. She is able to progress to a wall mini squat hold for quad strengthening with good fatigue and no increase in pain. She is gradually demonstrating improving tolerance to exercises progressions.       Goals:   (to be met in 16 visits)  Pt will improve knee extension ROM to +5 deg to allow proper heel strike during gait and terminal knee extension in stance-MET   Pt will improve knee AROM flexion to >130 degrees to improve ability to perform don/doffing shoes-MET  Pt will improve quad strength to 4/5 to ascend 1 flight of stairs reciprocally with 1 UE assist-Progressing    Pt will increase hip and knee strength to grossly 4/5 to be able to get up and down from the floor safely -Progressing  Pt will demonstrate increased hip ER/ABD strength to 4/5 to perform stepping  and squatting activities without excessive femoral IR/ADD -Progressing  Pt will improve SLS to >20s to improve safety with gait on uneven surfaces such as grass and gravel-Not Met  Pt will be independent and compliant with comprehensive HEP to maintain progress achieved in PT        Plan: Continue skilled Physical Therapy 1-2 x/week or a total of 10 visits over a 90 day period. Treatment will include: ThereEx, neuro re-ead,  manual tech, therapeutic act        Progress strength and stability as tolerated      Date: 12/17/24  Tx#: 8/10 Date: 12/20/24  Tx#: 9/10 Date: 12/26/24  Tx#: 10/20 Date: 12/31/24  Tx#: 11/20 Date: 1/7/25  Tx#: 12/20   TherEx: 45 min  -NuStep: 6 min  -BL TFL stretch: 3x30 sec using strap  -BL supine HS stretch w/ strap: 3x30 sec  -BL SLR w/ slight ER: 2#, 2x10  -BL SAQ: 4#, 3x20  -SL bridges Figure 4: 2x10   -BL s/l hip abd 2#: 2x10  -S/l hip circles: 2#, 2x10 each leg   -bent over fire hydrant: GTB, 2x10  -bent over leg ext knee straight: GTB 2x20  -standing hip abduction: RTB, 2x10  -Lateral walk: GTB, 2x 20 feet each way          Hold  -sit to stands  -leg press TherEx: 45 min  -NuStep: 6 min  -BL TFL stretch: 3x30 sec using strap  -BL supine HS stretch w/ strap: 3x30 sec  -BL SLR w/ slight ER: 2#, 2x15  -BL SAQ: 4#, 3x20  -SL bridges Figure 4: 2x15  -BL s/l hip abd 2#: 2x15  -S/l hip circles: 2#, 2x15 each leg   -bent over fire hydrant: GTB, 2x10  -bent over leg ext knee straight: GTB 2x20  -standing hip abduction: RTB, 2x10  -Lateral walk: RTB, 2x 20 feet each way  -sit to stands: RTB, x10  -leg press:  TherEx: 53 min  -NuStep: 6 min  -re-assessment  -BL TFL stretch: 3x30 sec using strap  -BL supine HS stretch w/ strap: 3x30 sec  -BL SLR w/ slight ER: 2#, 2x20  -BL LAQ: 5#, 2x20  -SL bridges Figure 4: 2x15-hold  -BL s/l hip abd 2#: 2x20  -S/l hip circles: 2#, 2x20 each leg   -bent over leg ext knee straight: GTB 2x20  -standing hip abduction: GTB, 2x10  -Lateral walk: GTB, 2x 20 feet each  way  -sit to stands: RTB, [id number]  -SL press: 50#, 2x10 each TherEx: 45 min  -NuStep: 6 min  -BL TFL stretch: 3x30 sec using strap  -BL supine HS stretch w/ strap: 3x30 sec  -Heel slides: x20  -BL SLR w/ slight ER: 2#, 2x20  -BL LAQ: 5#, 2x20  -SL bridges w/ SLR: 2x20  -RSB bridges: knees extended x10, HS curls x10 (painful on (L))  -S/l hip circles: 2#, 2x20 each leg   -side plank lift w/ clam: RTB, 2x10   -bent over leg ext knee straight: GTB 2x20  -standing hip abduction: GTB, 2x10  -Lateral walk: GTB, 2x 20 feet each way  -sit to stands on airex: RTB, x10  -SL press: 50#, 2x10 each TherEx: 45 min  -NuStep: 6 min  -BL TFL stretch: 3x30 sec using strap  -BL supine HS stretch w/ strap: 3x30 sec  -BL SLR w/ slight ER: 3#, 2x20  -BL LAQ: 5#, 2x20  -SL bridges w/ SLR: 2x15  -RSB bridges: knees extended 2x10,  -S/l hip circles: 3#, 2x10 each leg   -side plank lift hold w/ clam: RTB, 2x10   -bent over leg ext knee straight: GTB 2x20  -standing hip abduction: GTB, 2x10  -YSB mini squat wall hold: GTB at knees, 3x30 sec        Hold  Lunge hold  SLB w/ toe touch and pallof press           HEP:     Access Code: [email or web address]  URL: [email or web address]  Date: 12/26/2024  Prepared by: Kimberly Abel    Exercises  - Hooklying Hamstring Stretch with Strap  - 2-3 x daily - 7 x weekly - 3 sets - 30 sec hold  - Supine ITB Stretch with Strap  - 2-3 x daily - 7 x weekly - 3 sets - 30 sec hold  - Sidelying Hip Abduction  - 1 x daily - 2 x weekly - 3 sets - 10 reps  - Sidelying Hip Circles  - 1 x daily - 2 x weekly - 3 sets - 10 reps  - Supine Active Straight Leg Raise  - 1 x daily - 2 x weekly - 3 sets - 10 reps  - Clamshell with Resistance  - 1 x daily - 2 x weekly - 3 sets - 10 reps  - Figure 4 Bridge  - 1 x daily - 2 x weekly - 3 sets - 10 reps  - Side Stepping with Resistance at Ankles  - 1 x daily - 2 x weekly - 3 sets - 10 reps  - Hip Abduction with Resistance Loop  - 1 x daily - 2 x weekly - 3 sets - 10 reps  -  Standing 3-Way Leg Reach with Resistance at Ankles and Counter Support  - 1 x daily - 2 x weekly - 3 sets - 10 reps  - Standing Diagonal Hip Extension and External Rotation  - 1 x daily - 2 x weekly - 3 sets - 10 reps    Charges: TherEx: 3 units         Total Timed Treatment: 45 min  Total Treatment Time: 45 min

## 2025-01-13 ENCOUNTER — OFFICE VISIT (OUTPATIENT)
Dept: RHEUMATOLOGY | Facility: CLINIC | Age: 70
End: 2025-01-13
Payer: MEDICARE

## 2025-01-13 VITALS
TEMPERATURE: 97 F | RESPIRATION RATE: 16 BRPM | OXYGEN SATURATION: 98 % | HEIGHT: 62 IN | DIASTOLIC BLOOD PRESSURE: 54 MMHG | WEIGHT: 109 LBS | BODY MASS INDEX: 20.06 KG/M2 | HEART RATE: 72 BPM | SYSTOLIC BLOOD PRESSURE: 102 MMHG

## 2025-01-13 DIAGNOSIS — Z79.899 IMMUNODEFICIENCY DUE TO DRUG THERAPY (HCC): ICD-10-CM

## 2025-01-13 DIAGNOSIS — M11.20 CHONDROCALCINOSIS: ICD-10-CM

## 2025-01-13 DIAGNOSIS — D84.821 IMMUNODEFICIENCY DUE TO DRUG THERAPY (HCC): ICD-10-CM

## 2025-01-13 DIAGNOSIS — M06.00 SERONEGATIVE RHEUMATOID ARTHRITIS (HCC): Primary | ICD-10-CM

## 2025-01-13 DIAGNOSIS — M25.541 ARTHRALGIA OF BOTH HANDS: ICD-10-CM

## 2025-01-13 DIAGNOSIS — M10.9 GOUT, UNSPECIFIED CAUSE, UNSPECIFIED CHRONICITY, UNSPECIFIED SITE: ICD-10-CM

## 2025-01-13 DIAGNOSIS — M1A.0611 IDIOPATHIC CHRONIC GOUT OF RIGHT KNEE WITH TOPHUS: ICD-10-CM

## 2025-01-13 DIAGNOSIS — Z51.81 THERAPEUTIC DRUG MONITORING: ICD-10-CM

## 2025-01-13 DIAGNOSIS — G89.29 CHRONIC PAIN OF RIGHT KNEE: ICD-10-CM

## 2025-01-13 DIAGNOSIS — M25.542 ARTHRALGIA OF BOTH HANDS: ICD-10-CM

## 2025-01-13 DIAGNOSIS — M25.50 ARTHRALGIA, UNSPECIFIED JOINT: ICD-10-CM

## 2025-01-13 DIAGNOSIS — M25.561 CHRONIC PAIN OF RIGHT KNEE: ICD-10-CM

## 2025-01-13 RX ORDER — PREDNISONE 5 MG/1
TABLET ORAL
Qty: 32 TABLET | Refills: 2 | Status: SHIPPED | OUTPATIENT
Start: 2025-01-13 | End: 2025-01-23

## 2025-01-13 RX ORDER — METHOTREXATE 2.5 MG/1
25 TABLET ORAL WEEKLY
Qty: 130 TABLET | Refills: 0 | Status: SHIPPED | OUTPATIENT
Start: 2025-01-13 | End: 2025-04-14

## 2025-01-13 RX ORDER — PREDNISONE 5 MG/1
TABLET ORAL
Qty: 32 TABLET | Refills: 0 | Status: SHIPPED | OUTPATIENT
Start: 2025-01-13 | End: 2025-01-23

## 2025-01-13 RX ORDER — COLCHICINE 0.6 MG/1
0.6 TABLET ORAL 2 TIMES DAILY
Qty: 180 TABLET | Refills: 1 | Status: SHIPPED | OUTPATIENT
Start: 2025-01-13

## 2025-01-13 NOTE — PROGRESS NOTES
Rheumatology f/u Patient Note  =====================================================================================================    Chief complaint: crystalline arthritis/seronegative rheumatoid arthritis    Chief Complaint   Patient presents with    Follow - Up     Both knees are hurting badly today. This week knees have been bad with pain, especially when walking.      Ortho:   Kai Bishop MD    PCP  Jennifer Lynn MD  Fax: [phone]  Phone: [phone]  =====================================================================================================  HPI   Date of visit: 3/20/2023  Mónica Wu is a 69 year old female   Here for further evaluation of inflammatory arthritis.  -PMHx: Non-invasive low grade papillary urothelial carcinoma, chronic smoker  -Patient reports chronic arthralgia in the ankles for over 20 years.  More recently she has had persistent right knee swelling for which she underwent evaluation by orthopedic surgery (Dr. Bishop).  30 cc of inflammatory fluid was aspirated from the right knee and December 2022.  Corticosteroid injection provided at the time led to 2 months of complete relief with partial recurrence of symptoms in the last couple months or so.  -She tried meloxicam 15 mg daily which helped with arthralgia significantly but she ended up with dyspepsia so she returned to her usual ibuprofen regimen.  -day pain is the worst  -dry eye: Previously using Restasis but cannot afford it given several $100 co-pay.  -sister and grandmother with RA.  -No family history of gout or any other autoimmune disease.  Medications:  Ibuprofen 200 mg every 2 hours, takes 8-12 each day.   ==============================================================================================================  Visit: 09/09/24  Doing very well. Then had a flare-up 6 weeks ago. Took prednisone 30 mg taper without success.  Pain is in the lateral knees.  Knees are not swollen.  Escalation of  methotrexate since last visit has led to significant improvement in pain.  Still smoking.  Medications/therapies:  Colchicine 0.6 mg twice daily   Methotrexate 25 mg weekly (Fri 5 pills, Sat 5 pills)  ==============================================================================================================  Today's Visit: 01/13/25    Was doing okay until the last week when the knees became more swollen and painful.  Particularly the left knee which has not had any swelling in the past.  More stress in the household recently.  -Continues on colchicine 0.6 mg twice daily, methotrexate 25 mg split dose weekly      Medications:  Current Outpatient Medications on File Prior to Visit   Medication Sig Dispense Refill    Irbesartan 150 MG Oral Tab Take 1 tablet (150 mg total) by mouth daily. 90 tablet 3    folic acid 1 MG Oral Tab Take 3 tablets (3 mg total) by mouth daily. 270 tablet 3    Calcium Carbonate Antacid (TUMS ULTRA 1000 OR)       Vitamin K 100 MCG Oral Tab       Vitamin D, Cholecalciferol, 50 MCG (2000 UT) Oral Cap       latanoprost 0.005 % Ophthalmic Solution INSTILL 1 DROP INTO AFFECTED EYES DAILY AT BEDTIME      acetaminophen 500 MG Oral Tab Take 1 tablet (500 mg total) by mouth every 6 (six) hours as needed for Pain. 750 mg twice a day (tylenol arthritis)      Timolol Maleate 0.5 % Ophthalmic Solution       Multiple Vitamin (ONE-DAILY MULTI VITAMINS) Oral Tab Take 1 tablet by mouth daily.      ibuprofen 200 MG Oral Tab Take 1 tablet (200 mg total) by mouth every 6 (six) hours as needed for Pain. (Patient not taking: Reported on 1/13/2025)       No current facility-administered medications on file prior to visit.     ?  Allergies:  Allergies   Allergen Reactions    Wellbutrin [Bupropion] OTHER (SEE COMMENTS)     Tremors/shakes         Objective    Vitals:    01/13/25 1028   BP: 102/54   Pulse: 72   Resp: 16   Temp: 97.3 °F (36.3 °C)   SpO2: 98%   Weight: 109 lb (49.4 kg)   Height: 5' 2\" (1.575 m)        GEN: NAD, well-nourished.   HEENT: Head: NCAT. Face: No lesions. Eyes: Conjunctiva clear.   PULM:  easy effort  Extremities: No cyanosis, edema or deformities.   Neurologic: Strength, CN2-12 grossly intact   Skin: No lesions or rashes.  MSK: 28 joint count performed. No evidence of synovitis in mcp, pip, dip, wrist, elbows, shoulders, hips, knees, ankles, mtp unless otherwise noted. Full ROM of elbows, wrists, knees.     Right DIP 2, 3 nodules that are more prominent and are forming  -Mild left greater than right suprapatellar effusion     PtGA: 1  SJ: 2 (bilateral knees)  TJ: 2  MDGA: 1      Labs:    Lab Results   Component Value Date    URIC 4.4 03/22/2023       Lab Results   Component Value Date    WBC 7.9 12/10/2024    RBC 3.77 (L) 12/10/2024    HGB 12.7 12/10/2024    HCT 37.9 12/10/2024    .0 12/10/2024    .5 (H) 12/10/2024    MCH 33.7 12/10/2024    MCHC 33.5 12/10/2024    RDW 14.2 12/10/2024    NEPRELIM 4.19 12/10/2024    NEPERCENT 52.8 12/10/2024    LYPERCENT 35.4 12/10/2024    MOPERCENT 9.0 12/10/2024    EOPERCENT 1.6 12/10/2024    BAPERCENT 0.9 12/10/2024    NE 4.19 12/10/2024    LYMABS 2.81 12/10/2024    MOABSO 0.71 12/10/2024    EOABSO 0.13 12/10/2024    BAABSO 0.07 12/10/2024     Lab Results   Component Value Date    GLU 85 12/10/2024    BUN 10 12/10/2024    BUNCREA 18.0 07/26/2021    CREATSERUM 0.60 12/10/2024    ANIONGAP 7 12/10/2024     11/15/2016    GFRNAA 101 01/26/2022    GFRAA 116 01/26/2022    CA 9.7 12/10/2024    OSMOCALC 290 12/10/2024    ALKPHO 75 12/10/2024    AST 17 12/10/2024    ALT 19 12/10/2024    BILT 0.6 12/10/2024    TP 7.0 12/10/2024    ALB 4.1 12/10/2024    GLOBULIN 2.9 12/10/2024     12/10/2024    K 4.2 12/10/2024     12/10/2024    CO2 26.0 12/10/2024       12/2022 Right knee synovial fluid   WBC 8930, percent neutrophils 94%, 2% lymphocytes.  No crystals.    2/2023  Creatinine 0.48, rest of CMP WNL  CBC W differential WNL    3/2023  Sed rate  44  CRP 0.87 mg/DL  Hepatitis B/C WNL  Quant gold WNL  Ferritin 61.4  Mag/Phos WNL  PTH 55.4  RF, CCP is negative  LILY is negative  Uric acid 4.4  TSH WNL  Additional Labs:    Radiology:    2023 DEXA:   LUMBAR SPINE ANALYSIS RESULTS:      Bone mineral density (BMD) (g/cm2):  0.894    Lumbar T-Score:  -1.4      % young normals:  85      % age matched controls:  105      Change from prior spine examination:  0.4%               TOTAL HIP ANALYSIS RESULTS:        Bone mineral density (BMD) (g/cm2):  0.635      Total Hip T-Score:  -2.5      % young normals:  67      % age matched controls:  80      Change from prior hip examination:  -8.6*%               FEMORAL NECK ANALYSIS RESULTS:        Bone mineral density (BMD) (g/cm2):  0.498      Femoral neck T-Score:  -3.2      % young normals:  59      % age matched controls:  73      Change from prior hip examination:  -27.8*%           Radiology review:      =====================================================================================================  Assessment and Plan    Assessment:  1. Seronegative rheumatoid arthritis (HCC)    2. Chondrocalcinosis    3. Arthralgia, unspecified joint    4. Gout, unspecified cause, unspecified chronicity, unspecified site    5. Therapeutic drug monitoring    6. Chronic pain of right knee    7. 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 Arthralgia of both hands    9. 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 Right knee synovial fluid aspirate in 12/2022 consistent with active inflammation.  Chondrocalcinosis in the left knee is noted on review of prior plain radiographs.   --Lack of crystals in synovial fluid does not rule out crystalline arthropathy especially since CPP crystals may be difficult to visualize.   --PIPs/DIPs, ankle effusions, right knee and knee effusion had previously  significantly improved with colchicine monotherapy.  -- Overall doing fairly well, CDAI 6.  Knees are bothering her more in the last week with increased synovitis/effusions.  --Worsening DIP nodularity, tempo concerning for developing tophi.    #Osteoporosis: No fracture history.  Risk factors include smoking, low calcium intake  -Reclast: 6/19/2024    High risk medication labs including CMP and CBC w/ diff reviewed from 12/2024. Results are stable. HBsAg, HBcAB total negative, HCV neg, IGRA neg in 3/2023  -patient does not drink etoh  -Sees dentist regularly.  Teeth are good.    Plan:    Repeat blood work in March 2025.  XR of the hands to evaluate for erosive disease    [facility] CT (DECT) scan needed to detect gouty arthropathy and tophi of the right knee  Needs to be done at [facility] in [place] scan room #4.     Prednisone 30 mg taper over 10 days for active rheumatoid arthritis/crystalline arthropathy flare.  Did discuss corticosteroid injections, patient like to hold off for now.  If the prednisone taper does not help then she will reach out to us for arthrocentesis.    Methotrexate continue 10 pills weekly  -Friday night: 5 pills  -Saturday night: 5 pills    -Continue colchicine 0.6 mg twice daily    Rtc 3-4 months     Immunization History   Administered Date(s) Administered    Covid-19 Vaccine Pfizer 30 mcg/0.3 ml 03/26/2021, 04/16/2021, 12/30/2021    FLU VAC High Dose 65 YRS & Older PRSV Free (75204) 10/27/2023    Influenza 10/31/2024    Pneumococcal Conjugate PCV20 09/28/2023    Pneumovax 23 01/22/2020    TDAP 05/08/2024    Zoster Vaccine Recombinant Adjuvanted (Shingrix) 11/25/2023, 04/10/2024         Diagnoses and all orders for this visit:    Seronegative rheumatoid arthritis (HCC)  -     colchicine 0.6 MG Oral Tab; Take 1 tablet (0.6 mg total) by mouth 2 (two) times daily.  -     methotrexate 2.5 MG Oral Tab; Take 10 tablets (25 mg total) by mouth once a week.  -     Comp Metabolic Panel (14);  Future  -     CBC With Differential With Platelet; Future  -     CK (Creatine Kinase) (Not Creatinine); Future  -     CT KNEE RIGHT (CPT=73700); Future  -     predniSONE 5 MG Oral Tab; Take 6 tablets (30 mg total) by mouth daily for 2 days, THEN 4 tablets (20 mg total) daily for 2 days, THEN 3 tablets (15 mg total) daily for 2 days, THEN 2 tablets (10 mg total) daily for 2 days, THEN 1 tablet (5 mg total) daily for 2 days.    Chondrocalcinosis  -     colchicine 0.6 MG Oral Tab; Take 1 tablet (0.6 mg total) by mouth 2 (two) times daily.  -     methotrexate 2.5 MG Oral Tab; Take 10 tablets (25 mg total) by mouth once a week.  -     Comp Metabolic Panel (14); Future  -     CBC With Differential With Platelet; Future  -     CT KNEE RIGHT (CPT=73700); Future  -     predniSONE 5 MG Oral Tab; Take 6 tablets (30 mg total) by mouth daily for 2 days, THEN 4 tablets (20 mg total) daily for 2 days, THEN 3 tablets (15 mg total) daily for 2 days, THEN 2 tablets (10 mg total) daily for 2 days, THEN 1 tablet (5 mg total) daily for 2 days.    Arthralgia, unspecified joint  -     colchicine 0.6 MG Oral Tab; Take 1 tablet (0.6 mg total) by mouth 2 (two) times daily.    Gout, unspecified cause, unspecified chronicity, unspecified site  -     colchicine 0.6 MG Oral Tab; Take 1 tablet (0.6 mg total) by mouth 2 (two) times daily.  -     methotrexate 2.5 MG Oral Tab; Take 10 tablets (25 mg total) by mouth once a week.  -     Comp Metabolic Panel (14); Future  -     CBC With Differential With Platelet; Future  -     predniSONE 5 MG Oral Tab; Take 6 tablets (30 mg total) by mouth daily for 2 days, THEN 4 tablets (20 mg total) daily for 2 days, THEN 3 tablets (15 mg total) daily for 2 days, THEN 2 tablets (10 mg total) daily for 2 days, THEN 1 tablet (5 mg total) daily for 2 days.  -     XR HAND BILAT (MIN 3 VIEWS) (CPT=73130-50); Future  -     CT KNEE RIGHT (CPT=73700); Future  -     predniSONE 5 MG Oral Tab; Take 6 tablets (30 mg total) by  mouth daily for 2 days, THEN 4 tablets (20 mg total) daily for 2 days, THEN 3 tablets (15 mg total) daily for 2 days, THEN 2 tablets (10 mg total) daily for 2 days, THEN 1 tablet (5 mg total) daily for 2 days.    Therapeutic drug monitoring  -     colchicine 0.6 MG Oral Tab; Take 1 tablet (0.6 mg total) by mouth 2 (two) times daily.  -     methotrexate 2.5 MG Oral Tab; Take 10 tablets (25 mg total) by mouth once a week.  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      The above plan of care, diagnosis, orders, and follow-up were discussed with the patient. Questions related to this recommended plan of care were answered.    Thank you for referring this delightful patient to me. Please feel free to contact me with any questions.     This report was performed utilizing speech recognition software technology. Despite proofreading, speech recognition errors could escape detection. If a word or phrase is confusing or out of context, please do not hesitate to call for   clarification.       Kind regards      Isaias Olmedo MD  [facility]   bilateral TM's clear

## 2025-01-13 NOTE — PATIENT INSTRUCTIONS
Repeat blood work in March 2025.  XR of the hands    [facility] CT (DECT) scan needed to detect gouty arthropathy and tophi of the right knee    Needs to be done at [facility] in [place] scan room #4.

## 2025-01-14 ENCOUNTER — APPOINTMENT (OUTPATIENT)
Dept: PHYSICAL THERAPY | Age: 70
End: 2025-01-14
Attending: INTERNAL MEDICINE
Payer: MEDICARE

## 2025-01-14 NOTE — PROGRESS NOTES
Chief Complaint   Patient presents with    Sinus Problem     Room 2, JAZMIN, pt has sinus problems, headache, congestion, cough, yellow discharge for 2 weeks.    Cough    Nasal Congestion       HPI:  Patient presents with complaint of 2 weeks of cough, congestion and sinus pressure.  Patient states she was trying to wait to see if symptoms would resolve on their own however they have not.  Cough seems to be worsening and happens in paroxysms.  At times when she is coughing she is having difficulty catching her breath.  Patient does continue to smoke.  She has had to use albuterol in the past with upper respiratory infection was and has tried a couple of times at home with some improvement.  She is now out of albuterol.  Patient was also placed on a prednisone taper for active arthritis by Dr. Olmedo her rheumatologist on Monday.  Note reviewed today.     Patient denies frequent sinus infections and states her last 1 was about a year ago.  Patient denies ear pain and sore throat.  Her cough is mostly dry.  She denies any known fever and chills.    Review of Systems   See HPI.  No other complaints today.    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    High blood pressure    Pain in joints    Ankle and knee inflammation    Smoker    Visual impairment    glasses    Wears glasses       Patient Active Problem List   Diagnosis    Smoker    History of ductal carcinoma in situ (DCIS) of breast    Essential hypertension    Conductive hearing loss    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 18 g 0    benzonatate 200 MG Oral Cap Take 1 capsule (200 mg total) by mouth 3 (three) times daily as needed for cough (Swallow whole). 20 capsule 0    amoxicillin clavulanate 875-125 MG Oral Tab Take 1 tablet by mouth 2 (two) times daily for 10 days. 20 tablet 0    colchicine 0.6 MG Oral Tab Take 1 tablet (0.6 mg total) by mouth 2 (two) times daily. 180 tablet 1    methotrexate 2.5 MG Oral Tab Take 10 tablets (25 mg total) by mouth once a week. 130 tablet 0    predniSONE 5 MG Oral Tab Take 6 tablets (30 mg total) by mouth daily for 2 days, THEN 4 tablets (20 mg total) daily for 2 days, THEN 3 tablets (15 mg total) daily for 2 days, THEN 2 tablets (10 mg total) daily for 2 days, THEN 1 tablet (5 mg total) daily for 2 days. 32 tablet 0    predniSONE 5 MG Oral Tab Take 6 tablets (30 mg total) by mouth daily for 2 days, THEN 4 tablets (20 mg total) daily for 2 days, THEN 3 tablets (15 mg total) daily for 2 days, THEN 2 tablets (10 mg total) daily for 2 days, THEN 1 tablet (5 mg total) daily for 2 days. 32 tablet 2    Irbesartan 150 MG Oral Tab Take 1 tablet (150 mg total) by mouth daily. 90 tablet 3    folic acid 1 MG Oral Tab Take 3 tablets (3 mg total) by mouth daily. 270 tablet 3    Calcium Carbonate Antacid (TUMS ULTRA 1000 OR)       Vitamin K 100 MCG Oral Tab       Vitamin D, Cholecalciferol, 50 MCG (2000 UT) Oral Cap       latanoprost 0.005 % Ophthalmic Solution INSTILL 1 DROP INTO AFFECTED EYES DAILY AT BEDTIME      acetaminophen 500 MG Oral Tab Take 1  tablet (500 mg total) by mouth every 6 (six) hours as needed for Pain. 750 mg twice a day (tylenol arthritis)      ibuprofen 200 MG Oral Tab Take 1 tablet (200 mg total) by mouth every 6 (six) hours as needed for Pain.      Multiple Vitamin (ONE-DAILY MULTI VITAMINS) Oral Tab Take 1 tablet by mouth daily.         Physical Exam  /64 (BP Location: Left arm, Patient Position: Sitting, Cuff Size: adult)   Pulse 73   Temp 98.3 °F (36.8 °C) (Oral)   Ht 5' 2\" (1.575 m)   Wt 110 lb 3.2 oz (50 kg)   SpO2 98%   BMI 20.16 kg/m²   Constitutional:  No distress.   HEENT:  Normocephalic and atraumatic. Tympanic membranes normal.  Nose normal. Oropharynx is moist with mild global erythema.  No exudates.  + maxillary sinus tenderness with palp.   Eyes: Conjunctivae are normal. PERRLA.  Neck: Normal range of motion. Neck supple.   Cardiovascular: Normal rate, regular rhythm.  No murmur, rubs or gallops.   Pulmonary/Chest: Effort normal and breath sounds mildly diminished No respiratory distress. No wheezes, rhonchi or rales  Lymphadenopathy: No cervical adenopathy.   Skin: Skin is warm and dry. No rash noted. No erythema. No pallor.       A/P:    Encounter Diagnoses   Name Primary?    Acute non-recurrent maxillary sinusitis - augmentin X 10 days, follow up if not resolving/improving.   Yes    Acute cough - suspect component of reactive airways.  Pt is already in Prednisone taper per Rheum (for other reason but could help), add Albuterol prn and Tessalon perles 200 mg TID prn cough.       Essential hypertension - controlled, continue current meds.       Smoker - still smoking, not ready to quit      -  HM - Due for CPE in March, check labs prior to that visit.      Orders Placed This Encounter   Procedures    CBC W Differential W Platelet [E]    Comp Metabolic Panel (14) [E]    TSH W Reflex To Free T4 [E]       Meds & Refills for this Visit:  Requested Prescriptions     Signed Prescriptions Disp Refills    albuterol 108  (90 Base) MCG/ACT Inhalation Aero Soln 18 g 0     Sig: Inhale 2 puffs into the lungs every 4 (four) hours as needed for Shortness of Breath (cough).    benzonatate 200 MG Oral Cap 20 capsule 0     Sig: Take 1 capsule (200 mg total) by mouth 3 (three) times daily as needed for cough (Swallow whole).    amoxicillin clavulanate 875-125 MG Oral Tab 20 tablet 0     Sig: Take 1 tablet by mouth 2 (two) times daily for 10 days.       Imaging & Consults:  None    Return in about 2 months (around 3/15/2025), or if symptoms worsen or fail to improve, for Annual wellness visit.  There are no Patient Instructions on file for this visit.    All questions were answered and the patient understands the plan.

## 2025-01-15 ENCOUNTER — OFFICE VISIT (OUTPATIENT)
Dept: INTERNAL MEDICINE CLINIC | Facility: CLINIC | Age: 70
End: 2025-01-15
Payer: MEDICARE

## 2025-01-15 ENCOUNTER — TELEPHONE (OUTPATIENT)
Dept: PHYSICAL THERAPY | Age: 70
End: 2025-01-15

## 2025-01-15 VITALS
OXYGEN SATURATION: 98 % | DIASTOLIC BLOOD PRESSURE: 64 MMHG | BODY MASS INDEX: 20.28 KG/M2 | HEIGHT: 62 IN | HEART RATE: 73 BPM | WEIGHT: 110.19 LBS | TEMPERATURE: 98 F | SYSTOLIC BLOOD PRESSURE: 134 MMHG

## 2025-01-15 DIAGNOSIS — J01.00 ACUTE NON-RECURRENT MAXILLARY SINUSITIS: Primary | ICD-10-CM

## 2025-01-15 DIAGNOSIS — I10 ESSENTIAL HYPERTENSION: ICD-10-CM

## 2025-01-15 DIAGNOSIS — R05.1 ACUTE COUGH: ICD-10-CM

## 2025-01-15 DIAGNOSIS — F17.200 SMOKER: ICD-10-CM

## 2025-01-15 PROCEDURE — 99214 OFFICE O/P EST MOD 30 MIN: CPT | Performed by: PHYSICIAN ASSISTANT

## 2025-01-15 PROCEDURE — G2211 COMPLEX E/M VISIT ADD ON: HCPCS | Performed by: PHYSICIAN ASSISTANT

## 2025-01-15 RX ORDER — ALBUTEROL SULFATE 90 UG/1
2 INHALANT RESPIRATORY (INHALATION) EVERY 4 HOURS PRN
Qty: 18 G | Refills: 0 | Status: SHIPPED | OUTPATIENT
Start: 2025-01-15

## 2025-01-15 RX ORDER — BENZONATATE 200 MG/1
200 CAPSULE ORAL 3 TIMES DAILY PRN
Qty: 20 CAPSULE | Refills: 0 | Status: SHIPPED | OUTPATIENT
Start: 2025-01-15

## 2025-01-17 ENCOUNTER — OFFICE VISIT (OUTPATIENT)
Dept: PHYSICAL THERAPY | Age: 70
End: 2025-01-17
Attending: INTERNAL MEDICINE
Payer: MEDICARE

## 2025-01-17 PROCEDURE — 97110 THERAPEUTIC EXERCISES: CPT | Performed by: PHYSICAL THERAPIST

## 2025-01-17 NOTE — PROGRESS NOTES
Diagnosis:   Greater trochanteric bursitis of both hips (M70.61,M70.62)  Iliotibial band syndrome of left side (M76.32)  Iliotibial band syndrome of right side (M76.31)  Bilateral primary osteoarthritis of knee (M17.0)  Seronegative rheumatoid arthritis (HCC) (M06.00)  Therapeutic drug monitoring (Z51.81)         Referring Provider: Isaias Olmedo Date of Evaluation:    11/20/24    Precautions:   Hearing impairment, RA  Next MD visit:   none scheduled  Date of Surgery: n/a   Insurance Primary/Secondary: MEDICARE / AARP     # Auth Visits: N/A            Subjective: Pt reports her knees flared up the last week and were swollen. She saw her RA doctor who offered a cortisone injection but she denied and has been taking prednisone which helped a little     Pain: currently 3/10 (R) knee pain on pain meds and steroids       Objective:     Mild (R) knee edema                                                           Assessment: Pt presenting with reports of a recent BL knee flare up which has been going on for a week now and has been taking steroid medicine that her RA doctor prescribed. Pt is also awaiting a CT scan to r/u gout on (R) knee. Kept strengthening exercises focused on non-weight bearing proximal hip and quad strengthening which she tolerates well w/o significant increase in pain.       Goals:   (to be met in 16 visits)  Pt will improve knee extension ROM to +5 deg to allow proper heel strike during gait and terminal knee extension in stance-MET   Pt will improve knee AROM flexion to >130 degrees to improve ability to perform don/doffing shoes-MET  Pt will improve quad strength to 4/5 to ascend 1 flight of stairs reciprocally with 1 UE assist-Progressing    Pt will increase hip and knee strength to grossly 4/5 to be able to get up and down from the floor safely -Progressing  Pt will demonstrate increased hip ER/ABD strength to 4/5 to perform stepping and squatting activities without excessive femoral IR/ADD  -Progressing  Pt will improve SLS to >20s to improve safety with gait on uneven surfaces such as grass and gravel-Not Met  Pt will be independent and compliant with comprehensive HEP to maintain progress achieved in PT        Plan: Continue skilled Physical Therapy 1-2 x/week or a total of 10 visits over a 90 day period. Treatment will include: ThereEx, neuro re-ead,  manual tech, therapeutic act        Progress strength and stability as tolerated      Date: 12/17/24  Tx#: 8/10 Date: 12/20/24  Tx#: 9/10 Date: 12/26/24  Tx#: 10/20 Date: 12/31/24  Tx#: 11/20 Date: 1/7/25  Tx#: 12/20 Date: 1/17/25  Tx#: 13/20   TherEx: 45 min  -NuStep: 6 min  -BL TFL stretch: 3x30 sec using strap  -BL supine HS stretch w/ strap: 3x30 sec  -BL SLR w/ slight ER: 2#, 2x10  -BL SAQ: 4#, 3x20  -SL bridges Figure 4: 2x10   -BL s/l hip abd 2#: 2x10  -S/l hip circles: 2#, 2x10 each leg   -bent over fire hydrant: GTB, 2x10  -bent over leg ext knee straight: GTB 2x20  -standing hip abduction: RTB, 2x10  -Lateral walk: GTB, 2x 20 feet each way          Hold  -sit to stands  -leg press TherEx: 45 min  -NuStep: 6 min  -BL TFL stretch: 3x30 sec using strap  -BL supine HS stretch w/ strap: 3x30 sec  -BL SLR w/ slight ER: 2#, 2x15  -BL SAQ: 4#, 3x20  -SL bridges Figure 4: 2x15  -BL s/l hip abd 2#: 2x15  -S/l hip circles: 2#, 2x15 each leg   -bent over fire hydrant: GTB, 2x10  -bent over leg ext knee straight: GTB 2x20  -standing hip abduction: RTB, 2x10  -Lateral walk: RTB, 2x 20 feet each way  -sit to stands: RTB, x10  -leg press:  TherEx: 53 min  -NuStep: 6 min  -re-assessment  -BL TFL stretch: 3x30 sec using strap  -BL supine HS stretch w/ strap: 3x30 sec  -BL SLR w/ slight ER: 2#, 2x20  -BL LAQ: 5#, 2x20  -SL bridges Figure 4: 2x15-hold  -BL s/l hip abd 2#: 2x20  -S/l hip circles: 2#, 2x20 each leg   -bent over leg ext knee straight: GTB 2x20  -standing hip abduction: GTB, 2x10  -Lateral walk: GTB, 2x 20 feet each way  -sit to stands: RTB,  [id number]  -SL press: 50#, 2x10 each TherEx: 45 min  -NuStep: 6 min  -BL TFL stretch: 3x30 sec using strap  -BL supine HS stretch w/ strap: 3x30 sec  -Heel slides: x20  -BL SLR w/ slight ER: 2#, 2x20  -BL LAQ: 5#, 2x20  -SL bridges w/ SLR: 2x20  -RSB bridges: knees extended x10, HS curls x10 (painful on (L))  -S/l hip circles: 2#, 2x20 each leg   -side plank lift w/ clam: RTB, 2x10   -bent over leg ext knee straight: GTB 2x20  -standing hip abduction: GTB, 2x10  -Lateral walk: GTB, 2x 20 feet each way  -sit to stands on airex: RTB, x10  -SL press: 50#, 2x10 each TherEx: 45 min  -NuStep: 6 min  -BL TFL stretch: 3x30 sec using strap  -BL supine HS stretch w/ strap: 3x30 sec  -BL SLR w/ slight ER: 3#, 2x20  -BL LAQ: 5#, 2x20  -SL bridges w/ SLR: 2x15  -RSB bridges: knees extended 2x10,  -S/l hip circles: 3#, 2x10 each leg   -side plank lift hold w/ clam: RTB, 2x10   -bent over leg ext knee straight: GTB 2x20  -standing hip abduction: GTB, 2x10  -YSB mini squat wall hold: GTB at knees, 3x30 sec        Hold  Lunge hold  SLB w/ toe touch and pallof press  TherEx: 45 min  -NuStep: 7 min  -BL TFL stretch: 3x30 sec using strap  -BL supine HS stretch w/ strap: 3x30 sec  -BL SLR w/ slight ER: 3#, 3x20  -BL LAQ: 5#, 2x20  -SL bridges w/ SLR: 2x15  -RSB bridges: knees extended 2x10,  -S/l hip circles: 3#, 2x10 each leg   -SAQ: 5#, 2x20  -prone leg raise w/ hip abd's: 2x20  -Prone TKE: 2x20 each          Hold  Lunge hold  SLB w/ toe touch and pallof press            HEP:     Access Code: [email or web address]  URL: [email or web address]  Date: 12/26/2024  Prepared by: Kimberly Abel    Exercises  - Hooklying Hamstring Stretch with Strap  - 2-3 x daily - 7 x weekly - 3 sets - 30 sec hold  - Supine ITB Stretch with Strap  - 2-3 x daily - 7 x weekly - 3 sets - 30 sec hold  - Sidelying Hip Abduction  - 1 x daily - 2 x weekly - 3 sets - 10 reps  - Sidelying Hip Circles  - 1 x daily - 2 x weekly - 3 sets - 10 reps  - Supine Active  Straight Leg Raise  - 1 x daily - 2 x weekly - 3 sets - 10 reps  - Clamshell with Resistance  - 1 x daily - 2 x weekly - 3 sets - 10 reps  - Figure 4 Bridge  - 1 x daily - 2 x weekly - 3 sets - 10 reps  - Side Stepping with Resistance at Ankles  - 1 x daily - 2 x weekly - 3 sets - 10 reps  - Hip Abduction with Resistance Loop  - 1 x daily - 2 x weekly - 3 sets - 10 reps  - Standing 3-Way Leg Reach with Resistance at Ankles and Counter Support  - 1 x daily - 2 x weekly - 3 sets - 10 reps  - Standing Diagonal Hip Extension and External Rotation  - 1 x daily - 2 x weekly - 3 sets - 10 reps    Charges: TherEx: 3 units         Total Timed Treatment: 45 min  Total Treatment Time: 45 min

## 2025-01-20 NOTE — PROGRESS NOTES
Diagnosis:   Greater trochanteric bursitis of both hips (M70.61,M70.62)  Iliotibial band syndrome of left side (M76.32)  Iliotibial band syndrome of right side (M76.31)  Bilateral primary osteoarthritis of knee (M17.0)  Seronegative rheumatoid arthritis (HCC) (M06.00)  Therapeutic drug monitoring (Z51.81)         Referring Provider: Isaias Olmedo Date of Evaluation:    11/20/24    Precautions:   Hearing impairment, RA  Next MD visit:   none scheduled  Date of Surgery: n/a   Insurance Primary/Secondary: MEDICARE / AARP     # Auth Visits: N/A            Subjective: Pt reports her knees are not bad today but continue to take extra strength tylenol this morning.     Pain: currently 0/10 (R) knee       Objective:     Observation: Significant tibial ER and knee valgus BL (L)>(R)                                                           Assessment: Pt presenting asymptomatic at time of visit. Progressed SL stability with pallof press and SL RDL holds to promote improved weight bearing tolerance. She demonstrates significant tibial ER and knee valgus during but reports no pain. She was progressed with side plank hip lifts with early onset of fatigue.     Goals:   (to be met in 16 visits)  Pt will improve knee extension ROM to +5 deg to allow proper heel strike during gait and terminal knee extension in stance-MET   Pt will improve knee AROM flexion to >130 degrees to improve ability to perform don/doffing shoes-MET  Pt will improve quad strength to 4/5 to ascend 1 flight of stairs reciprocally with 1 UE assist-Progressing    Pt will increase hip and knee strength to grossly 4/5 to be able to get up and down from the floor safely -Progressing  Pt will demonstrate increased hip ER/ABD strength to 4/5 to perform stepping and squatting activities without excessive femoral IR/ADD -Progressing  Pt will improve SLS to >20s to improve safety with gait on uneven surfaces such as grass and gravel-Not Met  Pt will be independent and  compliant with comprehensive HEP to maintain progress achieved in PT        Plan: Continue skilled Physical Therapy 1-2 x/week or a total of 10 visits over a 90 day period. Treatment will include: ThereEx, neuro re-ead,  manual tech, therapeutic act        Progress strength and stability as tolerated      Date: 12/26/24  Tx#: 10/20 Date: 12/31/24  Tx#: 11/20 Date: 1/7/25  Tx#: 12/20 Date: 1/17/25  Tx#: 13/20 Date: 1/21/25  Tx#: 14/20   TherEx: 53 min  -NuStep: 6 min  -re-assessment  -BL TFL stretch: 3x30 sec using strap  -BL supine HS stretch w/ strap: 3x30 sec  -BL SLR w/ slight ER: 2#, 2x20  -BL LAQ: 5#, 2x20  -SL bridges Figure 4: 2x15-hold  -BL s/l hip abd 2#: 2x20  -S/l hip circles: 2#, 2x20 each leg   -bent over leg ext knee straight: GTB 2x20  -standing hip abduction: GTB, 2x10  -Lateral walk: GTB, 2x 20 feet each way  -sit to stands: RTB, [id number]  -SL press: 50#, 2x10 each TherEx: 45 min  -NuStep: 6 min  -BL TFL stretch: 3x30 sec using strap  -BL supine HS stretch w/ strap: 3x30 sec  -Heel slides: x20  -BL SLR w/ slight ER: 2#, 2x20  -BL LAQ: 5#, 2x20  -SL bridges w/ SLR: 2x20  -RSB bridges: knees extended x10, HS curls x10 (painful on (L))  -S/l hip circles: 2#, 2x20 each leg   -side plank lift w/ clam: RTB, 2x10   -bent over leg ext knee straight: GTB 2x20  -standing hip abduction: GTB, 2x10  -Lateral walk: GTB, 2x 20 feet each way  -sit to stands on airex: RTB, x10  -SL press: 50#, 2x10 each TherEx: 45 min  -NuStep: 6 min  -BL TFL stretch: 3x30 sec using strap  -BL supine HS stretch w/ strap: 3x30 sec  -BL SLR w/ slight ER: 3#, 2x20  -BL LAQ: 5#, 2x20  -SL bridges w/ SLR: 2x15  -RSB bridges: knees extended 2x10,  -S/l hip circles: 3#, 2x10 each leg   -side plank lift hold w/ clam: RTB, 2x10   -bent over leg ext knee straight: GTB 2x20  -standing hip abduction: GTB, 2x10  -YSB mini squat wall hold: GTB at knees, 3x30 sec        Hold  Lunge hold  SLB w/ toe touch and pallof press  TherEx: 45 min  -NuStep:  7 min  -BL TFL stretch: 3x30 sec using strap  -BL supine HS stretch w/ strap: 3x30 sec  -BL SLR w/ slight ER: 3#, 3x20  -BL LAQ: 5#, 2x20  -SL bridges w/ SLR: 2x15  -RSB bridges: knees extended 2x10,  -S/l hip circles: 3#, 2x10 each leg   -SAQ: 5#, 2x20  -prone leg raise w/ hip abd's: 2x20  -Prone TKE: 2x20 each          Hold  Lunge hold  SLB w/ toe touch and pallof press  TherEx: 45 min  -NuStep: 7 min  -BL TFL stretch: 3x30 sec using strap  -BL supine HS stretch w/ strap: 3x30 sec  -BL SLR w/ slight ER: 3#, 3x20  -S/l clam lifts: x 15 each side   -S/l hip abductions and circles: 3#, 2x10 each leg   -BL LAQ: 5#, 2x20  -SL bridges w/ SLR: 2x15  -RSB bridges: knees extended 2x10  -LAQ: 5#, 2x20  -prone leg raise w/ hip abd's (pillow under hips): 2x20  -standing TKE: GTB, 2x20 each  -SLB w/ toe touch and pallof press: 2 RTB's  -SL RDL hold: 1 HHA, 20 sec x2 reps            Hold  Lunge hold  SLB w/ toe touch and pallof press           HEP:     Access Code: [email or web address]  URL: [email or web address]  Date: 12/26/2024  Prepared by: Kimberly Abel    Exercises  - Hooklying Hamstring Stretch with Strap  - 2-3 x daily - 7 x weekly - 3 sets - 30 sec hold  - Supine ITB Stretch with Strap  - 2-3 x daily - 7 x weekly - 3 sets - 30 sec hold  - Sidelying Hip Abduction  - 1 x daily - 2 x weekly - 3 sets - 10 reps  - Sidelying Hip Circles  - 1 x daily - 2 x weekly - 3 sets - 10 reps  - Supine Active Straight Leg Raise  - 1 x daily - 2 x weekly - 3 sets - 10 reps  - Clamshell with Resistance  - 1 x daily - 2 x weekly - 3 sets - 10 reps  - Figure 4 Bridge  - 1 x daily - 2 x weekly - 3 sets - 10 reps  - Side Stepping with Resistance at Ankles  - 1 x daily - 2 x weekly - 3 sets - 10 reps  - Hip Abduction with Resistance Loop  - 1 x daily - 2 x weekly - 3 sets - 10 reps  - Standing 3-Way Leg Reach with Resistance at Ankles and Counter Support  - 1 x daily - 2 x weekly - 3 sets - 10 reps  - Standing Diagonal Hip Extension and  External Rotation  - 1 x daily - 2 x weekly - 3 sets - 10 reps    Charges: TherEx: 3 units         Total Timed Treatment: 45 min  Total Treatment Time: 45 min

## 2025-01-21 ENCOUNTER — OFFICE VISIT (OUTPATIENT)
Dept: PHYSICAL THERAPY | Age: 70
End: 2025-01-21
Attending: INTERNAL MEDICINE
Payer: MEDICARE

## 2025-01-21 PROCEDURE — 97110 THERAPEUTIC EXERCISES: CPT | Performed by: PHYSICAL THERAPIST

## 2025-01-24 ENCOUNTER — HOSPITAL ENCOUNTER (OUTPATIENT)
Dept: CT IMAGING | Facility: HOSPITAL | Age: 70
Discharge: HOME | End: 2025-01-24
Attending: INTERNAL MEDICINE
Payer: MEDICARE

## 2025-01-24 ENCOUNTER — HOSPITAL ENCOUNTER (OUTPATIENT)
Dept: GENERAL RADIOLOGY | Facility: HOSPITAL | Age: 70
Discharge: HOME | End: 2025-01-24
Attending: INTERNAL MEDICINE
Payer: MEDICARE

## 2025-01-24 ENCOUNTER — OFFICE VISIT (OUTPATIENT)
Dept: PHYSICAL THERAPY | Age: 70
End: 2025-01-24
Attending: INTERNAL MEDICINE
Payer: MEDICARE

## 2025-01-24 DIAGNOSIS — G89.29 CHRONIC PAIN OF RIGHT KNEE: ICD-10-CM

## 2025-01-24 DIAGNOSIS — M06.00 SERONEGATIVE RHEUMATOID ARTHRITIS (HCC): ICD-10-CM

## 2025-01-24 DIAGNOSIS — M10.9 GOUT, UNSPECIFIED CAUSE, UNSPECIFIED CHRONICITY, UNSPECIFIED SITE: ICD-10-CM

## 2025-01-24 DIAGNOSIS — M11.20 CHONDROCALCINOSIS: ICD-10-CM

## 2025-01-24 DIAGNOSIS — M1A.0611 IDIOPATHIC CHRONIC GOUT OF RIGHT KNEE WITH TOPHUS: ICD-10-CM

## 2025-01-24 DIAGNOSIS — M25.561 CHRONIC PAIN OF RIGHT KNEE: ICD-10-CM

## 2025-01-24 DIAGNOSIS — M25.542 ARTHRALGIA OF BOTH HANDS: ICD-10-CM

## 2025-01-24 DIAGNOSIS — M25.541 ARTHRALGIA OF BOTH HANDS: ICD-10-CM

## 2025-01-24 PROCEDURE — 73700 CT LOWER EXTREMITY W/O DYE: CPT | Performed by: INTERNAL MEDICINE

## 2025-01-24 PROCEDURE — 73130 X-RAY EXAM OF HAND: CPT | Performed by: INTERNAL MEDICINE

## 2025-01-24 PROCEDURE — 97110 THERAPEUTIC EXERCISES: CPT | Performed by: PHYSICAL THERAPIST

## 2025-01-24 NOTE — PROGRESS NOTES
Diagnosis:   Greater trochanteric bursitis of both hips (M70.61,M70.62)  Iliotibial band syndrome of left side (M76.32)  Iliotibial band syndrome of right side (M76.31)  Bilateral primary osteoarthritis of knee (M17.0)  Seronegative rheumatoid arthritis (HCC) (M06.00)  Therapeutic drug monitoring (Z51.81)         Referring Provider: Isaias Olmedo Date of Evaluation:    11/20/24    Precautions:   Hearing impairment, RA  Next MD visit:   none scheduled  Date of Surgery: n/a   Insurance Primary/Secondary: MEDICARE / AARP     # Auth Visits: N/A            Subjective: Pt reports her knees are feeling well. She did fine after new PT exercises last visits and she had a busy day after as well. Yesterday she only took 2 Tylenols .     Pain: currently 0-1/10 (R) knee       Objective:     Observation: Significant tibial ER and knee valgus BL (L)>(R)                                                           Assessment: Pt reports good tolerance with advanced weight bearing exercises last tx. Increased resistance on, LAQ, SL pallof press and increased sets in s/l clam lifts to promote glut strengthening and stability. She tolerates pain with no increase in pain.     Goals:   (to be met in 16 visits)  Pt will improve knee extension ROM to +5 deg to allow proper heel strike during gait and terminal knee extension in stance-MET   Pt will improve knee AROM flexion to >130 degrees to improve ability to perform don/doffing shoes-MET  Pt will improve quad strength to 4/5 to ascend 1 flight of stairs reciprocally with 1 UE assist-Progressing    Pt will increase hip and knee strength to grossly 4/5 to be able to get up and down from the floor safely -Progressing  Pt will demonstrate increased hip ER/ABD strength to 4/5 to perform stepping and squatting activities without excessive femoral IR/ADD -Progressing  Pt will improve SLS to >20s to improve safety with gait on uneven surfaces such as grass and gravel-Not Met  Pt will be  independent and compliant with comprehensive HEP to maintain progress achieved in PT        Plan: Continue skilled Physical Therapy 1-2 x/week or a total of 10 visits over a 90 day period. Treatment will include: ThereEx, neuro re-ead,  manual tech, therapeutic act        Progress strength and stability as tolerated      Date: 12/26/24  Tx#: 10/20 Date: 12/31/24  Tx#: 11/20 Date: 1/7/25  Tx#: 12/20 Date: 1/17/25  Tx#: 13/20 Date: 1/21/25  Tx#: 14/20 Date: 1/24/25  Tx#: 15/20   TherEx: 53 min  -NuStep: 6 min  -re-assessment  -BL TFL stretch: 3x30 sec using strap  -BL supine HS stretch w/ strap: 3x30 sec  -BL SLR w/ slight ER: 2#, 2x20  -BL LAQ: 5#, 2x20  -SL bridges Figure 4: 2x15-hold  -BL s/l hip abd 2#: 2x20  -S/l hip circles: 2#, 2x20 each leg   -bent over leg ext knee straight: GTB 2x20  -standing hip abduction: GTB, 2x10  -Lateral walk: GTB, 2x 20 feet each way  -sit to stands: RTB, [id number]  -SL press: 50#, 2x10 each TherEx: 45 min  -NuStep: 6 min  -BL TFL stretch: 3x30 sec using strap  -BL supine HS stretch w/ strap: 3x30 sec  -Heel slides: x20  -BL SLR w/ slight ER: 2#, 2x20  -BL LAQ: 5#, 2x20  -SL bridges w/ SLR: 2x20  -RSB bridges: knees extended x10, HS curls x10 (painful on (L))  -S/l hip circles: 2#, 2x20 each leg   -side plank lift w/ clam: RTB, 2x10   -bent over leg ext knee straight: GTB 2x20  -standing hip abduction: GTB, 2x10  -Lateral walk: GTB, 2x 20 feet each way  -sit to stands on airex: RTB, x10  -SL press: 50#, 2x10 each TherEx: 45 min  -NuStep: 6 min  -BL TFL stretch: 3x30 sec using strap  -BL supine HS stretch w/ strap: 3x30 sec  -BL SLR w/ slight ER: 3#, 2x20  -BL LAQ: 5#, 2x20  -SL bridges w/ SLR: 2x15  -RSB bridges: knees extended 2x10,  -S/l hip circles: 3#, 2x10 each leg   -side plank lift hold w/ clam: RTB, 2x10   -bent over leg ext knee straight: GTB 2x20  -standing hip abduction: GTB, 2x10  -YSB mini squat wall hold: GTB at knees, 3x30 sec        Hold  Lunge hold  SLB w/ toe touch  and pallof press  TherEx: 45 min  -NuStep: 7 min  -BL TFL stretch: 3x30 sec using strap  -BL supine HS stretch w/ strap: 3x30 sec  -BL SLR w/ slight ER: 3#, 3x20  -BL LAQ: 5#, 2x20  -SL bridges w/ SLR: 2x15  -RSB bridges: knees extended 2x10,  -S/l hip circles: 3#, 2x10 each leg   -SAQ: 5#, 2x20  -prone leg raise w/ hip abd's: 2x20  -Prone TKE: 2x20 each          Hold  Lunge hold  SLB w/ toe touch and pallof press  TherEx: 45 min  -NuStep: 7 min  -BL TFL stretch: 3x30 sec using strap  -BL supine HS stretch w/ strap: 3x30 sec  -BL SLR w/ slight ER: 3#, 3x20  -S/l clam lifts: x 15 each side   -S/l hip abductions and circles: 3#, 2x10 each leg   -BL LAQ: 5#, 2x20  -SL bridges w/ SLR: 2x15  -RSB bridges: knees extended 2x10  -LAQ: 5#, 2x20  -prone leg raise w/ hip abd's (pillow under hips): 2x20  -standing TKE: GTB, 2x20 each  -SLB w/ toe touch and pallof press: 2 RTB's  -SL RDL hold: 1 HHA, 20 sec x2 reps            Hold  Lunge hold  SLB w/ toe touch and pallof press  TherEx: 45 min  -NuStep: 7 min  -BL TFL stretch: 3x30 sec using strap  -BL supine HS stretch w/ strap: 3x30 sec  -BL SLR w/ slight ER: 3#, 3x20  -S/l clam lifts: 2x15 each side   -S/l hip abductions and circles: 3#, 2x10 each leg   -BL LAQ: 5#, 2x20  -SL bridges w/ SLR: 2x15  -RSB bridges: knees extended 2x10  -LAQ: 5#, 2x20  -prone leg raise w/ hip abd's (pillow under hips): 2x20  -standing TKE: GTB, 2x20 each  -SLB w/ toe touch and pallof press: 1 GTB's, x20 each leg  -SL RDL hold: 1 HHA, 20 sec x3 reps            Hold  Lunge hold  Bosu lungbelen           HEP:     Access Code: [email or web address]  URL: [email or web address]  Date: 12/26/2024  Prepared by: Kimberly Abel    Exercises  - Hooklying Hamstring Stretch with Strap  - 2-3 x daily - 7 x weekly - 3 sets - 30 sec hold  - Supine ITB Stretch with Strap  - 2-3 x daily - 7 x weekly - 3 sets - 30 sec hold  - Sidelying Hip Abduction  - 1 x daily - 2 x weekly - 3 sets - 10 reps  - Sidelying Hip  Circles  - 1 x daily - 2 x weekly - 3 sets - 10 reps  - Supine Active Straight Leg Raise  - 1 x daily - 2 x weekly - 3 sets - 10 reps  - Clamshell with Resistance  - 1 x daily - 2 x weekly - 3 sets - 10 reps  - Figure 4 Bridge  - 1 x daily - 2 x weekly - 3 sets - 10 reps  - Side Stepping with Resistance at Ankles  - 1 x daily - 2 x weekly - 3 sets - 10 reps  - Hip Abduction with Resistance Loop  - 1 x daily - 2 x weekly - 3 sets - 10 reps  - Standing 3-Way Leg Reach with Resistance at Ankles and Counter Support  - 1 x daily - 2 x weekly - 3 sets - 10 reps  - Standing Diagonal Hip Extension and External Rotation  - 1 x daily - 2 x weekly - 3 sets - 10 reps    Charges: TherEx: 3 units         Total Timed Treatment: 45 min  Total Treatment Time: 45 min

## 2025-01-27 NOTE — PROGRESS NOTES
Diagnosis:   Greater trochanteric bursitis of both hips (M70.61,M70.62)  Iliotibial band syndrome of left side (M76.32)  Iliotibial band syndrome of right side (M76.31)  Bilateral primary osteoarthritis of knee (M17.0)  Seronegative rheumatoid arthritis (HCC) (M06.00)  Therapeutic drug monitoring (Z51.81)         Referring Provider: Isaias Olmedo Date of Evaluation:    11/20/24    Precautions:   Hearing impairment, RA  Next MD visit:   none scheduled  Date of Surgery: n/a   Insurance Primary/Secondary: MEDICARE / AARP     # Auth Visits: N/A            Subjective: Pt reports knees are doing good right now but she did feel pain last night in the middle of the night and had to take pain medicine.     Pain: currently 1/10 (R) knee       Objective:     Observation: Significant tibial ER and knee valgus BL (L)>(R)    CT scan (R) knee: \" CONCLUSION:    1. There is advanced osteoarthritis in the knee particularly involving the lateral compartment.   2. Scattered areas of uric acid crystal are suggested along tendons.  This is favored to be artifactual.  There is no significant uric acid deposition within the joint.  There are no typical bone erosions.   3. There is a mild joint effusion.\"                                                           Assessment: Pt had a CT scan of the (R) knee which reports moderate level of (R) knee OA lateral compartment> medial compartment which correlates with her lateral knee pain. She tolerates non-weight bearing strengthening exercises well. In weight bearing she does not tolerate strengthening which requires active knee flexion extension movements due to symptom aggravation. She does tolerate static hold positions during balance and muscle endurance training.     Goals:   (to be met in 16 visits)  Pt will improve knee extension ROM to +5 deg to allow proper heel strike during gait and terminal knee extension in stance-MET   Pt will improve knee AROM flexion to >130 degrees to improve  ability to perform don/doffing shoes-MET  Pt will improve quad strength to 4/5 to ascend 1 flight of stairs reciprocally with 1 UE assist-Progressing    Pt will increase hip and knee strength to grossly 4/5 to be able to get up and down from the floor safely -Progressing  Pt will demonstrate increased hip ER/ABD strength to 4/5 to perform stepping and squatting activities without excessive femoral IR/ADD -Progressing  Pt will improve SLS to >20s to improve safety with gait on uneven surfaces such as grass and gravel-Not Met  Pt will be independent and compliant with comprehensive HEP to maintain progress achieved in PT        Plan: Continue skilled Physical Therapy 1-2 x/week or a total of 10 visits over a 90 day period. Treatment will include: ThereEx, neuro re-ead,  manual tech, therapeutic act        Progress strength and stability as tolerated      Date: 1/7/25  Tx#: 12/20 Date: 1/17/25  Tx#: 13/20 Date: 1/21/25  Tx#: 14/20 Date: 1/24/25  Tx#: 15/20 Date: 1/28/25  Tx#: 16/20   TherEx: 45 min  -NuStep: 6 min  -BL TFL stretch: 3x30 sec using strap  -BL supine HS stretch w/ strap: 3x30 sec  -BL SLR w/ slight ER: 3#, 2x20  -BL LAQ: 5#, 2x20  -SL bridges w/ SLR: 2x15  -RSB bridges: knees extended 2x10,  -S/l hip circles: 3#, 2x10 each leg   -side plank lift hold w/ clam: RTB, 2x10   -bent over leg ext knee straight: GTB 2x20  -standing hip abduction: GTB, 2x10  -YSB mini squat wall hold: GTB at knees, 3x30 sec        Hold  Lunge hold  SLB w/ toe touch and pallof press  TherEx: 45 min  -NuStep: 7 min  -BL TFL stretch: 3x30 sec using strap  -BL supine HS stretch w/ strap: 3x30 sec  -BL SLR w/ slight ER: 3#, 3x20  -BL LAQ: 5#, 2x20  -SL bridges w/ SLR: 2x15  -RSB bridges: knees extended 2x10,  -S/l hip circles: 3#, 2x10 each leg   -SAQ: 5#, 2x20  -prone leg raise w/ hip abd's: 2x20  -Prone TKE: 2x20 each          Hold  Lunge hold  SLB w/ toe touch and pallof press  TherEx: 45 min  -NuStep: 7 min  -BL TFL stretch: 3x30  sec using strap  -BL supine HS stretch w/ strap: 3x30 sec  -BL SLR w/ slight ER: 3#, 3x20  -S/l clam lifts: x 15 each side   -S/l hip abductions and circles: 3#, 2x10 each leg   -BL LAQ: 5#, 2x20  -SL bridges w/ SLR: 2x15  -RSB bridges: knees extended 2x10  -LAQ: 5#, 2x20  -prone leg raise w/ hip abd's (pillow under hips): 2x20  -standing TKE: GTB, 2x20 each  -SLB w/ toe touch and pallof press: 2 RTB's  -SL RDL hold: 1 HHA, 20 sec x2 reps            Hold  Lunge hold  SLB w/ toe touch and pallof press  TherEx: 45 min  -NuStep: 7 min  -BL TFL stretch: 3x30 sec using strap  -BL supine HS stretch w/ strap: 3x30 sec  -BL SLR w/ slight ER: 3#, 3x20  -S/l clam lifts: 2x15 each side   -S/l hip abductions and circles: 3#, 2x10 each leg   -BL LAQ: 5#, 2x20  -SL bridges w/ SLR: 2x15  -RSB bridges: knees extended 2x10  -LAQ: 5#, 2x20  -prone leg raise w/ hip abd's (pillow under hips): 2x20  -standing TKE: GTB, 2x20 each  -SLB w/ toe touch and pallof press: 1 GTB's, x20 each leg  -SL RDL hold: 1 HHA, 20 sec x3 reps            Hold  Lunge hold  Bosu lunge TherEx: 45 min  -NuStep: 7 min  -BL TFL stretch: 3x30 sec using strap  -BL supine HS stretch w/ strap: 3x30 sec  -BL SLR w/ slight ER: 3#, 3x20  -S/l clam lifts: 2x15 each side   -S/l hip abductions and circles: 3#, 2x10 each leg   -BL LAQ: 5#, 2x20  -SL bridges w/ SLR: 2x15  -prone leg raise w/ hip abd's (pillow under hips): 3#, 2x20  -standing TKE: GTB, 2x20 each  -SLB w/ toe touch and pallof press: 1 GTB's, x20 each leg  -SL RDL hold: 1 HHA, 20 sec x3 reps  -Lunge hold (knee in the back in extended position): 2x30 sec each            Hold    Bosu lunge          HEP:     Access Code: [email or web address]  URL: [email or web address]  Date: 12/26/2024  Prepared by: Kimberly Abel    Exercises  - Hooklying Hamstring Stretch with Strap  - 2-3 x daily - 7 x weekly - 3 sets - 30 sec hold  - Supine ITB Stretch with Strap  - 2-3 x daily - 7 x weekly - 3 sets - 30 sec hold  -  Sidelying Hip Abduction  - 1 x daily - 2 x weekly - 3 sets - 10 reps  - Sidelying Hip Circles  - 1 x daily - 2 x weekly - 3 sets - 10 reps  - Supine Active Straight Leg Raise  - 1 x daily - 2 x weekly - 3 sets - 10 reps  - Clamshell with Resistance  - 1 x daily - 2 x weekly - 3 sets - 10 reps  - Figure 4 Bridge  - 1 x daily - 2 x weekly - 3 sets - 10 reps  - Side Stepping with Resistance at Ankles  - 1 x daily - 2 x weekly - 3 sets - 10 reps  - Hip Abduction with Resistance Loop  - 1 x daily - 2 x weekly - 3 sets - 10 reps  - Standing 3-Way Leg Reach with Resistance at Ankles and Counter Support  - 1 x daily - 2 x weekly - 3 sets - 10 reps  - Standing Diagonal Hip Extension and External Rotation  - 1 x daily - 2 x weekly - 3 sets - 10 reps    Charges: TherEx: 3 units         Total Timed Treatment: 45 min  Total Treatment Time: 45 min

## 2025-01-28 ENCOUNTER — OFFICE VISIT (OUTPATIENT)
Dept: PHYSICAL THERAPY | Age: 70
End: 2025-01-28
Attending: INTERNAL MEDICINE
Payer: MEDICARE

## 2025-01-28 ENCOUNTER — TELEMEDICINE (OUTPATIENT)
Dept: RHEUMATOLOGY | Facility: CLINIC | Age: 70
End: 2025-01-28
Payer: MEDICARE

## 2025-01-28 VITALS — WEIGHT: 110 LBS | BODY MASS INDEX: 20 KG/M2

## 2025-01-28 DIAGNOSIS — Z79.899 IMMUNODEFICIENCY DUE TO DRUG THERAPY (HCC): ICD-10-CM

## 2025-01-28 DIAGNOSIS — D84.821 IMMUNODEFICIENCY DUE TO DRUG THERAPY (HCC): ICD-10-CM

## 2025-01-28 DIAGNOSIS — Z51.81 THERAPEUTIC DRUG MONITORING: ICD-10-CM

## 2025-01-28 DIAGNOSIS — M06.00 SERONEGATIVE RHEUMATOID ARTHRITIS (HCC): ICD-10-CM

## 2025-01-28 DIAGNOSIS — M1A.09X1 IDIOPATHIC CHRONIC GOUT OF MULTIPLE SITES WITH TOPHUS: Primary | ICD-10-CM

## 2025-01-28 PROCEDURE — G2211 COMPLEX E/M VISIT ADD ON: HCPCS | Performed by: INTERNAL MEDICINE

## 2025-01-28 PROCEDURE — 99214 OFFICE O/P EST MOD 30 MIN: CPT | Performed by: INTERNAL MEDICINE

## 2025-01-28 PROCEDURE — 97110 THERAPEUTIC EXERCISES: CPT | Performed by: PHYSICAL THERAPIST

## 2025-01-28 RX ORDER — PREDNISONE 5 MG/1
TABLET ORAL
Qty: 32 TABLET | Refills: 0 | Status: SHIPPED | OUTPATIENT
Start: 2025-01-28 | End: 2025-02-07

## 2025-01-28 RX ORDER — ALLOPURINOL 100 MG/1
TABLET ORAL
Qty: 195 TABLET | Refills: 1 | Status: SHIPPED | OUTPATIENT
Start: 2025-01-28 | End: 2025-04-28

## 2025-01-28 NOTE — PATIENT INSTRUCTIONS
Lab Results   Component Value Date    URIC 4.4 03/22/2023     Allopurinol uptitration (This will cure gout):  1 tablet (100 mg total) daily for 30 days  THEN 2 tablets (200 mg total) daily for 30 days  THEN 3 tablets (300 mg total) daily.     Get blood work in March 2025    Methotrexate continue 10 pills weekly  -Friday night: 5 pills  -Saturday night: 5 pills    -Continue colchicine 0.6 mg twice daily    Prednisone taper given (only as needed for gout flares):    6 tabs (30 mg) daily for 2 days  THEN 4 tabs (20 mg) daily for 2 days,   THEN 3 tabs (15 mg) daily for 2 day  THEN 2 tabs (10 mg ) daily for 2 days  THEN 1 tablet (5 mg total) daily for 2 days.

## 2025-01-28 NOTE — PROGRESS NOTES
Rheumatology f/u Patient Note  =====================================================================================================    Chief complaint: crystalline arthritis/seronegative rheumatoid arthritis    Chief Complaint   Patient presents with    Follow - Up     Test results discussion. Pain has improved since being on prednisone; last dose was approx last week. Currently recovering from a sinus infection.      Ortho:   Kai Bishop MD    PCP  Jennifer Lynn MD  Fax: [phone]  Phone: [phone]  =====================================================================================================  HPI   Date of visit: 3/20/2023  Mónica Wu is a 69 year old female   Here for further evaluation of inflammatory arthritis.  -PMHx: Non-invasive low grade papillary urothelial carcinoma, chronic smoker  -Patient reports chronic arthralgia in the ankles for over 20 years.  More recently she has had persistent right knee swelling for which she underwent evaluation by orthopedic surgery (Dr. Bishop).  30 cc of inflammatory fluid was aspirated from the right knee and December 2022.  Corticosteroid injection provided at the time led to 2 months of complete relief with partial recurrence of symptoms in the last couple months or so.  -She tried meloxicam 15 mg daily which helped with arthralgia significantly but she ended up with dyspepsia so she returned to her usual ibuprofen regimen.  -day pain is the worst  -dry eye: Previously using Restasis but cannot afford it given several $100 co-pay.  -sister and grandmother with RA.  -No family history of gout or any other autoimmune disease.  Medications:  Ibuprofen 200 mg every 2 hours, takes 8-12 each day.   ==============================================================================================================  Visit: 01/13/25  Was doing okay until the last week when the knees became more swollen and painful.  Particularly the left knee which has  not had any swelling in the past.  More stress in the household recently.  -Continues on colchicine 0.6 mg twice daily, methotrexate 25 mg split dose weekly  ==============================================================================================================  Today's Visit: 01/28/25    Doing a bit better since last visit.  Here to discuss dual-energy CT scan of the knee.  Continues on colchicine and methotrexate      Medications:  Current Outpatient Medications on File Prior to Visit   Medication Sig Dispense Refill    colchicine 0.6 MG Oral Tab Take 1 tablet (0.6 mg total) by mouth 2 (two) times daily. 180 tablet 1    methotrexate 2.5 MG Oral Tab Take 10 tablets (25 mg total) by mouth once a week. 130 tablet 0    Irbesartan 150 MG Oral Tab Take 1 tablet (150 mg total) by mouth daily. 90 tablet 3    folic acid 1 MG Oral Tab Take 3 tablets (3 mg total) by mouth daily. 270 tablet 3    Calcium Carbonate Antacid (TUMS ULTRA 1000 OR)       Vitamin D, Cholecalciferol, 50 MCG (2000 UT) Oral Cap       latanoprost 0.005 % Ophthalmic Solution INSTILL 1 DROP INTO AFFECTED EYES DAILY AT BEDTIME      acetaminophen 500 MG Oral Tab Take 1 tablet (500 mg total) by mouth every 6 (six) hours as needed for Pain. 750 mg twice a day (tylenol arthritis)      ibuprofen 200 MG Oral Tab Take 1 tablet (200 mg total) by mouth every 6 (six) hours as needed for Pain.      Multiple Vitamin (ONE-DAILY MULTI VITAMINS) Oral Tab Take 1 tablet by mouth daily.      Vitamin K 100 MCG Oral Tab        No current facility-administered medications on file prior to visit.     ?  Allergies:  Allergies   Allergen Reactions    Wellbutrin [Bupropion] OTHER (SEE COMMENTS)     Tremors/shakes         Objective    Vitals:    01/28/25 1542   Weight: 110 lb (49.9 kg)     GEN: NAD, well-nourished.   HEENT: Head: NCAT. Face: No lesions. Eyes: Conjunctiva clear.   PULM:  easy effort  Extremities: No cyanosis, edema or deformities.   Neurologic: Strength,  CN2-12 grossly intact   Skin: No lesions or rashes.      Labs:    Lab Results   Component Value Date    URIC 4.4 03/22/2023       Lab Results   Component Value Date    WBC 7.9 12/10/2024    RBC 3.77 (L) 12/10/2024    HGB 12.7 12/10/2024    HCT 37.9 12/10/2024    .0 12/10/2024    .5 (H) 12/10/2024    MCH 33.7 12/10/2024    MCHC 33.5 12/10/2024    RDW 14.2 12/10/2024    NEPRELIM 4.19 12/10/2024    NEPERCENT 52.8 12/10/2024    LYPERCENT 35.4 12/10/2024    MOPERCENT 9.0 12/10/2024    EOPERCENT 1.6 12/10/2024    BAPERCENT 0.9 12/10/2024    NE 4.19 12/10/2024    LYMABS 2.81 12/10/2024    MOABSO 0.71 12/10/2024    EOABSO 0.13 12/10/2024    BAABSO 0.07 12/10/2024     Lab Results   Component Value Date    GLU 85 12/10/2024    BUN 10 12/10/2024    BUNCREA 18.0 07/26/2021    CREATSERUM 0.60 12/10/2024    ANIONGAP 7 12/10/2024     11/15/2016    GFRNAA 101 01/26/2022    GFRAA 116 01/26/2022    CA 9.7 12/10/2024    OSMOCALC 290 12/10/2024    ALKPHO 75 12/10/2024    AST 17 12/10/2024    ALT 19 12/10/2024    BILT 0.6 12/10/2024    TP 7.0 12/10/2024    ALB 4.1 12/10/2024    GLOBULIN 2.9 12/10/2024     12/10/2024    K 4.2 12/10/2024     12/10/2024    CO2 26.0 12/10/2024       12/2022 Right knee synovial fluid   WBC 8930, percent neutrophils 94%, 2% lymphocytes.  No crystals.    2/2023  Creatinine 0.48, rest of CMP WNL  CBC W differential WNL    3/2023  Sed rate 44  CRP 0.87 mg/DL  Hepatitis B/C WNL  Quant gold WNL  Ferritin 61.4  Mag/Phos WNL  PTH 55.4  RF, CCP is negative  LILY is negative  Uric acid 4.4  TSH WNL  Additional Labs:    Radiology:    2023 DEXA:   LUMBAR SPINE ANALYSIS RESULTS:      Bone mineral density (BMD) (g/cm2):  0.894    Lumbar T-Score:  -1.4      % young normals:  85      % age matched controls:  105      Change from prior spine examination:  0.4%               TOTAL HIP ANALYSIS RESULTS:        Bone mineral density (BMD) (g/cm2):  0.635      Total Hip T-Score:  -2.5      % young  normals:  67      % age matched controls:  80      Change from prior hip examination:  -8.6*%               FEMORAL NECK ANALYSIS RESULTS:        Bone mineral density (BMD) (g/cm2):  0.498      Femoral neck T-Score:  -3.2      % young normals:  59      % age matched controls:  73      Change from prior hip examination:  -27.8*%           Radiology review:  CT KNEE RIGHT (TZL=01995)    Result Date: 1/27/2025  CONCLUSION:  1. There is advanced osteoarthritis in the knee particularly involving the lateral compartment. 2. Scattered areas of uric acid crystal are suggested along tendons.  This is favored to be artifactual.  There is no significant uric acid deposition within the joint.  There are no typical bone erosions. 3. There is a mild joint effusion.    LOCATION:  [facility]   Dictated by (CST): Lisandro Lopez MD on 1/27/2025 at 9:12 AM     Finalized by (CST): Lisandro Lopez MD on 1/27/2025 at 9:16 AM       XR HAND BILAT (MIN 3 VIEWS) (CPT=73130-50)    Result Date: 1/24/2025  CONCLUSION:   1. Severe degenerative changes of the DIP joints right hand 2nd and 3rd digit.  More mild degenerative changes at the base of the thumb.  2. 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  There is moderate degenerative changes of the base of the thumb with evidence of old traumatic injury.   LOCATION:  [facility]   Dictated by (CST): David Ocasio MD on 1/24/2025 at 5:41 PM     Finalized by (CST): David Ocasio MD on 1/24/2025 at 5:44 PM      =====================================================================================================  Assessment and Plan  Assessment:  1. Idiopathic chronic gout of multiple sites with tophus    2. Seronegative rheumatoid arthritis (HCC)    3. Therapeutic drug monitoring    4. 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 Right knee synovial fluid aspirate in 12/2022 consistent with active inflammation.  Chondrocalcinosis in the left knee is noted on review of prior plain radiographs.   --PIPs/DIPs, ankle effusions, right knee and knee effusion had previously significantly improved with colchicine monotherapy.  --DIP nodularity concerning for tophi  --Extensive discussion of pathophysiology of gout today.  Discussed gout is often caused by diminished renal handling of urate, resultant hyperuricemia, and subsequent deposition of monosodium urate crystals into articular structures.  Management of gouty arthropathy is twofold: 1) treatment of acute gout flares with NSAIDs, colchicine, corticosteroids, and/or anakinra; 2) dissolution of MSU crystals by urate lowering therapy (ULT).     #Osteoporosis: No fracture history.  Risk factors include smoking, low calcium intake  -Reclast: 6/19/2024    High risk medication labs including CMP and CBC w/ diff reviewed from 12/2024. Results are stable. HBsAg, HBcAB total negative, HCV neg, IGRA neg in 3/2023  -patient does not drink etoh  -Sees dentist regularly.  Teeth are good.    Plan:  Allopurinol uptitration (This will cure gout):  1 tablet (100 mg total) daily for 30 days  THEN 2 tablets (200 mg total) daily for 30 days  THEN 3 tablets (300 mg total) daily.     Get blood work in March 2025    Methotrexate continue 10 pills weekly  -Friday night: 5 pills  -Saturday night: 5 pills    -Continue colchicine 0.6 mg twice daily    Prednisone taper given (only as needed for gout flares):    6 tabs (30 mg) daily for 2 days  THEN 4 tabs (20 mg) daily for 2 days,   THEN 3 tabs (15 mg) daily for 2 day  THEN 2 tabs (10 mg ) daily for 2 days  THEN 1 tablet (5 mg total) daily for 2 days.      Called patient via number listed in chart today    Original appnt date: Today's Visit: 01/28/25    This video  telehealth visit (with Epic Video ) was conducted in lieu of a previously scheduled clinic visit because of the COVID-19 pandemic. The patient or patient guardian verbally consented to virtual/remote treatment. All medical decision making was made in conjunction with the patient. This telehealth visit was initiated by the patient or patient guardian given the in-person appointment time as above who was located at [home]. The patient presented alone. Risks and benefits of therapy recommended, and potential side effects of all medications prescribed were discussed.  Patient was counseled on symptoms that would necessitate more emergency follow up.     The patient was within the [facility] during our visit.     Patient understands and accepts financial responsibility for any deductible, coinsurance and/or co-pays associated with the service. The patient understands that the physical exam will be limited.    This telehealth visit was performed while I was physically located in a   Medical office at [address] [place] [address], [address], [facility] April 2025      Immunization History   Administered Date(s) Administered    Covid-19 Vaccine Pfizer 30 mcg/0.3 ml 03/26/2021, 04/16/2021, 12/30/2021    FLU VAC High Dose 65 YRS & Older PRSV Free (03250) 10/27/2023    Influenza 10/31/2024    Pfizer Covid-19 Vaccine 30mcg/0.3ml 12yrs+ 10/31/2024    Pneumococcal Conjugate PCV20 09/28/2023    Pneumovax 23 01/22/2020    TDAP 05/08/2024    Zoster Vaccine Recombinant Adjuvanted (Shingrix) 11/25/2023, 04/10/2024         Diagnoses and all orders for this visit:    Idiopathic chronic gout of multiple sites with tophus  -     Uric Acid; Future  -     allopurinol 100 MG Oral Tab; Take 1 tablet (100 mg total) by mouth daily for 30 days, THEN 2 tablets (200 mg total) daily for 30 days, THEN 3 tablets (300 mg total) daily.  -     predniSONE 5 MG Oral Tab; Take 6 tablets (30 mg total) by mouth daily for 2 days, THEN 4 tablets (20 mg  total) daily for 2 days, THEN 3 tablets (15 mg total) daily for 2 days, THEN 2 tablets (10 mg total) daily for 2 days, THEN 1 tablet (5 mg total) daily for 2 days.    Seronegative rheumatoid arthritis (HCC)  -     Uric Acid; Future  -     allopurinol 100 MG Oral Tab; Take 1 tablet (100 mg total) by mouth daily for 30 days, THEN 2 tablets (200 mg total) daily for 30 days, THEN 3 tablets (300 mg total) daily.  -     predniSONE 5 MG Oral Tab; Take 6 tablets (30 mg total) by mouth daily for 2 days, THEN 4 tablets (20 mg total) daily for 2 days, THEN 3 tablets (15 mg total) daily for 2 days, THEN 2 tablets (10 mg total) daily for 2 days, THEN 1 tablet (5 mg total) daily for 2 days.    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      The above plan of care, diagnosis, orders, and follow-up were discussed with the patient. Questions related to this recommended plan of care were answered.    Thank you for referring this delightful patient to me. Please feel free to contact me with any questions.     This report was performed utilizing speech recognition software technology. Despite proofreading, speech recognition errors could escape detection. If a word or phrase is confusing or out of context, please do not hesitate to call for   clarification.       Kind regards      Isaias Olmedo MD  [facility]

## 2025-01-31 ENCOUNTER — OFFICE VISIT (OUTPATIENT)
Dept: PHYSICAL THERAPY | Age: 70
End: 2025-01-31
Attending: INTERNAL MEDICINE
Payer: MEDICARE

## 2025-01-31 PROCEDURE — 97110 THERAPEUTIC EXERCISES: CPT | Performed by: PHYSICAL THERAPIST

## 2025-01-31 PROCEDURE — 97016 VASOPNEUMATIC DEVICE THERAPY: CPT | Performed by: PHYSICAL THERAPIST

## 2025-01-31 NOTE — PROGRESS NOTES
Discharge Summary  Pt has attended 17 visits in Physical Therapy.    Diagnosis:   Greater trochanteric bursitis of both hips (M70.61,M70.62)  Iliotibial band syndrome of left side (M76.32)  Iliotibial band syndrome of right side (M76.31)  Bilateral primary osteoarthritis of knee (M17.0)  Seronegative rheumatoid arthritis (HCC) (M06.00)  Therapeutic drug monitoring (Z51.81)         Referring Provider: Isaias Olmedo Date of Evaluation:    11/20/24    Precautions:   Hearing impairment, RA  Next MD visit:   none scheduled  Date of Surgery: n/a   Insurance Primary/Secondary: MEDICARE / [employer]     # Auth Visits: N/A            Subjective: Pt reports her doctor diagnosed her with gout in her (R) knee. Her knee pain is worse in the last few days. She has been walking more today. Her knees have swelled up more in the last few days and she is not sure why. She feels like she has plateaued in progress.     Pain: currently 3/10 (BL) knees      Objective:        AROM: (* denotes performed with pain)  Knee   Flexion: R 138; L 110* (increased edema)  Extension: R +5; L +5            Strength/MMT: (* denotes performed with pain)  Hip Knee Foot/Ankle   Flexion: R 4/5; L 4/5  Extension: R 4/5; L 4/5  Abduction: R 4/5; L 4-/5  ER: R 3+/5; L 3+/5  IR: R 4-/5; L 4/5 Flexion: R 4/5; L 4/5  Extension: R 4+/5; L 4+/5    DF: R 4-/5; L 4-/5  PF: R 4-/5; L 4-/5            Gait: pt ambulates on level ground with antalgia and increased tibial ER R>L.      Balance: SLS: R 5 sec, L 9 sec  Age appropriate norms for SLS: 60-68 y/o mean = 27.0 sec     Assessment: Pt had a recent CT scan and dx with gout in her knees which her doctor has put her on medication for. She reports increase in BL knee swelling and pain the the last 3 days and feels like she has plateaued in PT. She demonstrates improved LE strength and balance since last PN but regressed in (L) knee ROM due to recent increase in swelling and pain. Pt no longer is reporting any improvements  in function the last few session. She is discharged to a HEP at this time.        Goals:   (to be met in 16 visits)  Pt will improve knee extension ROM to +5 deg to allow proper heel strike during gait and terminal knee extension in stance-MET   Pt will improve knee AROM flexion to >130 degrees to improve ability to perform don/doffing shoes-MET  Pt will improve quad strength to 4/5 to ascend 1 flight of stairs reciprocally with 1 UE assist-Met   Pt will increase hip and knee strength to grossly 4/5 to be able to get up and down from the floor safely -Progressing  Pt will demonstrate increased hip ER/ABD strength to 4/5 to perform stepping and squatting activities without excessive femoral IR/ADD -Progressing  Pt will improve SLS to >20s to improve safety with gait on uneven surfaces such as grass and gravel-Progressing   Pt will be independent and compliant with comprehensive HEP to maintain progress achieved in PT  -Met      Plan: Discharged to HEP    Thank you for your referral. If you have any questions, please contact me at Dept: [phone].    Sincerely,  Electronically signed by therapist: Kimberly Abel, PT     Physician's certification required:  No  Please co-sign or sign and return this letter via fax as soon as possible to [phone].   I certify the need for these services furnished under this plan of treatment and while under my care.    X___________________________________________________ Date____________________    Certification From: 1/31/2025  To:5/1/2025        Date: 1/7/25  Tx#: 12/20 Date: 1/17/25  Tx#: 13/20 Date: 1/21/25  Tx#: 14/20 Date: 1/24/25  Tx#: 15/20 Date: 1/28/25  Tx#: 16/20 Date: 1/31/25  Tx#: 17/20   TherEx: 45 min  -NuStep: 6 min  -BL TFL stretch: 3x30 sec using strap  -BL supine HS stretch w/ strap: 3x30 sec  -BL SLR w/ slight ER: 3#, 2x20  -BL LAQ: 5#, 2x20  -SL bridges w/ SLR: 2x15  -RSB bridges: knees extended 2x10,  -S/l hip circles: 3#, 2x10 each leg   -side plank lift  hold w/ clam: RTB, 2x10   -bent over leg ext knee straight: GTB 2x20  -standing hip abduction: GTB, 2x10  -YSB mini squat wall hold: GTB at knees, 3x30 sec        Hold  Lunge hold  SLB w/ toe touch and pallof press  TherEx: 45 min  -NuStep: 7 min  -BL TFL stretch: 3x30 sec using strap  -BL supine HS stretch w/ strap: 3x30 sec  -BL SLR w/ slight ER: 3#, 3x20  -BL LAQ: 5#, 2x20  -SL bridges w/ SLR: 2x15  -RSB bridges: knees extended 2x10,  -S/l hip circles: 3#, 2x10 each leg   -SAQ: 5#, 2x20  -prone leg raise w/ hip abd's: 2x20  -Prone TKE: 2x20 each          Hold  Lunge hold  SLB w/ toe touch and pallof press  TherEx: 45 min  -NuStep: 7 min  -BL TFL stretch: 3x30 sec using strap  -BL supine HS stretch w/ strap: 3x30 sec  -BL SLR w/ slight ER: 3#, 3x20  -S/l clam lifts: x 15 each side   -S/l hip abductions and circles: 3#, 2x10 each leg   -BL LAQ: 5#, 2x20  -SL bridges w/ SLR: 2x15  -RSB bridges: knees extended 2x10  -LAQ: 5#, 2x20  -prone leg raise w/ hip abd's (pillow under hips): 2x20  -standing TKE: GTB, 2x20 each  -SLB w/ toe touch and pallof press: 2 RTB's  -SL RDL hold: 1 HHA, 20 sec x2 reps            Hold  Lunge hold  SLB w/ toe touch and pallof press  TherEx: 45 min  -NuStep: 7 min  -BL TFL stretch: 3x30 sec using strap  -BL supine HS stretch w/ strap: 3x30 sec  -BL SLR w/ slight ER: 3#, 3x20  -S/l clam lifts: 2x15 each side   -S/l hip abductions and circles: 3#, 2x10 each leg   -BL LAQ: 5#, 2x20  -SL bridges w/ SLR: 2x15  -RSB bridges: knees extended 2x10  -LAQ: 5#, 2x20  -prone leg raise w/ hip abd's (pillow under hips): 2x20  -standing TKE: GTB, 2x20 each  -SLB w/ toe touch and pallof press: 1 GTB's, x20 each leg  -SL RDL hold: 1 HHA, 20 sec x3 reps            Hold  Lunge hold  Bosu lunge TherEx: 45 min  -NuStep: 7 min  -BL TFL stretch: 3x30 sec using strap  -BL supine HS stretch w/ strap: 3x30 sec  -BL SLR w/ slight ER: 3#, 3x20  -S/l clam lifts: 2x15 each side   -S/l hip abductions and circles: 3#, 2x10  each leg   -BL LAQ: 5#, 2x20  -SL bridges w/ SLR: 2x15  -prone leg raise w/ hip abd's (pillow under hips): 3#, 2x20  -standing TKE: GTB, 2x20 each  -SLB w/ toe touch and pallof press: 1 GTB's, x20 each leg  -SL RDL hold: 1 HHA, 20 sec x3 reps  -Lunge hold (knee in the back in extended position): 2x30 sec each            Hold    Bosu lunge TherEx: 30 min  -NuStep: 7 min  -BL TFL stretch: 3x30 sec using strap  -BL supine HS stretch w/ strap: 3x30 sec  -BL SLR w/ slight ER: 3#, 3x20  -reassessment               Modalities:   Vasopneumatic device end of tx: (L) knee x10 min   HEP:     Access Code: [email or web address]  URL: [email or web address]  Date: 01/31/2025  Prepared by: Kimberly Abel    Exercises  - Hooklying Hamstring Stretch with Strap  - 2-3 x daily - 7 x weekly - 3 sets - 30 sec hold  - Supine ITB Stretch with Strap  - 2-3 x daily - 7 x weekly - 3 sets - 30 sec hold  - Sidelying Hip Abduction  - 1 x daily - 3 x weekly - 3 sets - 10 reps  - Sidelying Hip Circles  - 1 x daily - 3 x weekly - 3 sets - 10 reps  - Supine Active Straight Leg Raise  - 1 x daily - 3 x weekly - 3 sets - 10 reps  - Clamshell with Resistance  - 1 x daily - 3 x weekly - 3 sets - 10 reps  - Figure 4 Bridge  - 1 x daily - 3 x weekly - 3 sets - 10 reps  - Hip Abduction with Resistance Loop  - 1 x daily - 3 x weekly - 3 sets - 10 reps  - Standing 3-Way Leg Reach with Resistance at Ankles and Counter Support  - 1 x daily - 3 x weekly - 3 sets - 10 reps  - Standing Diagonal Hip Extension and External Rotation  - 1 x daily - 3 x weekly - 3 sets - 10 reps  - Seated Long Arc Quad with Ankle Weight  - 1 x daily - 3 x weekly - 3 sets - 10 reps  - Standing Terminal Knee Extension with Resistance  - 1 x daily - 3 x weekly - 3 sets - 10 reps  - Forward T  - 1 x daily - 3 x weekly - 3 sets - 10 reps - 20 sec hold  - Mini Lunge  - 1 x daily - 3 x weekly - 2 sets - 20 sec hold    Charges: TherEx: 2 units, vasopneumatic device: x1 unit        Total  Timed Treatment: 30 min  Total Treatment Time: 40 min

## 2025-02-04 ENCOUNTER — APPOINTMENT (OUTPATIENT)
Dept: PHYSICAL THERAPY | Age: 70
End: 2025-02-04
Attending: INTERNAL MEDICINE
Payer: MEDICARE

## 2025-02-07 ENCOUNTER — APPOINTMENT (OUTPATIENT)
Dept: PHYSICAL THERAPY | Age: 70
End: 2025-02-07
Attending: INTERNAL MEDICINE
Payer: MEDICARE

## 2025-02-13 ENCOUNTER — HOSPITAL ENCOUNTER (OUTPATIENT)
Dept: MAMMOGRAPHY | Age: 70
Discharge: HOME | End: 2025-02-13
Payer: MEDICARE

## 2025-02-13 DIAGNOSIS — Z12.31 SCREENING MAMMOGRAM FOR BREAST CANCER: ICD-10-CM

## 2025-02-13 PROCEDURE — 77067 SCR MAMMO BI INCL CAD: CPT

## 2025-02-13 PROCEDURE — 77063 BREAST TOMOSYNTHESIS BI: CPT

## 2025-03-10 ENCOUNTER — LAB ENCOUNTER (OUTPATIENT)
Dept: LAB | Age: 70
End: 2025-03-10
Attending: INTERNAL MEDICINE
Payer: MEDICARE

## 2025-03-10 DIAGNOSIS — M11.20 CHONDROCALCINOSIS: ICD-10-CM

## 2025-03-10 DIAGNOSIS — M06.00 SERONEGATIVE RHEUMATOID ARTHRITIS (HCC): ICD-10-CM

## 2025-03-10 DIAGNOSIS — Z51.81 THERAPEUTIC DRUG MONITORING: ICD-10-CM

## 2025-03-10 DIAGNOSIS — I10 ESSENTIAL HYPERTENSION: ICD-10-CM

## 2025-03-10 DIAGNOSIS — M10.9 GOUT, UNSPECIFIED CAUSE, UNSPECIFIED CHRONICITY, UNSPECIFIED SITE: ICD-10-CM

## 2025-03-10 DIAGNOSIS — M1A.09X1 IDIOPATHIC CHRONIC GOUT OF MULTIPLE SITES WITH TOPHUS: ICD-10-CM

## 2025-03-10 LAB
ALBUMIN SERPL-MCNC: 4.6 G/DL (ref 3.2–4.8)
ALBUMIN/GLOB SERPL: 1.8 {RATIO} (ref 1–2)
ALP LIVER SERPL-CCNC: 75 U/L
ALT SERPL-CCNC: 27 U/L
ANION GAP SERPL CALC-SCNC: 12 MMOL/L (ref 0–18)
AST SERPL-CCNC: 21 U/L (ref ?–34)
BASOPHILS # BLD AUTO: 0.08 X10(3) UL (ref 0–0.2)
BASOPHILS NFR BLD AUTO: 0.6 %
BILIRUB SERPL-MCNC: 0.7 MG/DL (ref 0.2–1.1)
BUN BLD-MCNC: 14 MG/DL (ref 9–23)
CALCIUM BLD-MCNC: 10 MG/DL (ref 8.7–10.6)
CHLORIDE SERPL-SCNC: 102 MMOL/L (ref 98–112)
CK SERPL-CCNC: 23 U/L
CO2 SERPL-SCNC: 24 MMOL/L (ref 21–32)
CREAT BLD-MCNC: 0.7 MG/DL
EGFRCR SERPLBLD CKD-EPI 2021: 95 ML/MIN/1.73M2 (ref 60–?)
EOSINOPHIL # BLD AUTO: 0.09 X10(3) UL (ref 0–0.7)
EOSINOPHIL NFR BLD AUTO: 0.7 %
ERYTHROCYTE [DISTWIDTH] IN BLOOD BY AUTOMATED COUNT: 14.5 %
FASTING STATUS PATIENT QL REPORTED: YES
GLOBULIN PLAS-MCNC: 2.5 G/DL (ref 2–3.5)
GLUCOSE BLD-MCNC: 71 MG/DL (ref 70–99)
HCT VFR BLD AUTO: 40.8 %
HGB BLD-MCNC: 13.4 G/DL
IMM GRANULOCYTES # BLD AUTO: 0.06 X10(3) UL (ref 0–1)
IMM GRANULOCYTES NFR BLD: 0.5 %
LYMPHOCYTES # BLD AUTO: 4.66 X10(3) UL (ref 1–4)
LYMPHOCYTES NFR BLD AUTO: 36 %
MCH RBC QN AUTO: 32.8 PG (ref 26–34)
MCHC RBC AUTO-ENTMCNC: 32.8 G/DL (ref 31–37)
MCV RBC AUTO: 99.8 FL
MONOCYTES # BLD AUTO: 0.82 X10(3) UL (ref 0.1–1)
MONOCYTES NFR BLD AUTO: 6.3 %
NEUTROPHILS # BLD AUTO: 7.25 X10 (3) UL (ref 1.5–7.7)
NEUTROPHILS # BLD AUTO: 7.25 X10(3) UL (ref 1.5–7.7)
NEUTROPHILS NFR BLD AUTO: 55.9 %
OSMOLALITY SERPL CALC.SUM OF ELEC: 285 MOSM/KG (ref 275–295)
PLATELET # BLD AUTO: 519 10(3)UL (ref 150–450)
POTASSIUM SERPL-SCNC: 4.3 MMOL/L (ref 3.5–5.1)
PROT SERPL-MCNC: 7.1 G/DL (ref 5.7–8.2)
RBC # BLD AUTO: 4.09 X10(6)UL
SODIUM SERPL-SCNC: 138 MMOL/L (ref 136–145)
TSI SER-ACNC: 1.91 UIU/ML (ref 0.55–4.78)
URATE SERPL-MCNC: 3.3 MG/DL
WBC # BLD AUTO: 13 X10(3) UL (ref 4–11)

## 2025-03-10 PROCEDURE — 84550 ASSAY OF BLOOD/URIC ACID: CPT

## 2025-03-10 PROCEDURE — 82550 ASSAY OF CK (CPK): CPT

## 2025-03-10 PROCEDURE — 80053 COMPREHEN METABOLIC PANEL: CPT

## 2025-03-10 PROCEDURE — 85025 COMPLETE CBC W/AUTO DIFF WBC: CPT

## 2025-03-10 PROCEDURE — 84443 ASSAY THYROID STIM HORMONE: CPT

## 2025-03-10 PROCEDURE — 36415 COLL VENOUS BLD VENIPUNCTURE: CPT

## 2025-03-17 PROBLEM — D84.821 IMMUNODEFICIENCY DUE TO DRUG THERAPY (HCC): Status: ACTIVE | Noted: 2025-03-17

## 2025-03-17 PROBLEM — Z79.899 IMMUNODEFICIENCY DUE TO DRUG THERAPY (HCC): Status: ACTIVE | Noted: 2025-03-17

## 2025-03-17 PROBLEM — M1A.09X1 IDIOPATHIC CHRONIC GOUT OF MULTIPLE SITES WITH TOPHUS: Status: ACTIVE | Noted: 2025-03-17

## 2025-03-17 NOTE — PROGRESS NOTES
Subjective:   Mónica Wu is a 69 year old female who presents for a Subsequent Annual Wellness visit (Pt already had Initial Annual Wellness) and scheduled follow up of multiple significant but stable problems.     Pt has been dealing with joint pain (gout and being managed by Rheum, see below) and is also the caregiver the her  and at times grandchildren.  Some days are harder than others.  Overall she is able to remain positive and is managing these stressful times.    History/Other:   Fall Risk Assessment:   She has been screened for Falls and is High Risk. Fall Prevention information provided to patient in After Visit Summary.    Do you feel unsteady when standing or walking?: (Patient-Rptd) Yes  Do you worry about falling?: (Patient-Rptd) Yes  Have you fallen in the past year?: (Patient-Rptd) No     Cognitive Assessment:   She had a completely normal cognitive assessment - see flowsheet entries       Functional Ability/Status:   Mónica Wu has some abnormal functions as listed below:  She has Dressing and/or Bathing issues based on screening of functional status.  Difficulty dressing or bathing?: (Patient-Rptd) No  Bathing or Showering: (Patient-Rptd) Cannot do without help  Dressing: (Patient-Rptd) Able without help  She has Hearing problems based on screening of functional status.She has Vision problems based on screening of functional status. She has Walking problems based on screening of functional status. She has problems with Daily Activities based on screening of functional status.       Depression Screening (PHQ):  PHQ-2 SCORE: 0  , done 3/20/2025   Last [place] Suicide Screening on 3/20/2025 was No Risk.          Advanced Directives:   She does have a Living Will but we do NOT have it on file in Epic.    She has a Power of  for Health Care on file in [facility].  Patient has Advance Care Planning documents but we do not have a copy in EMR. Discussed Advanced Care  Planning with patient and instructed patient to get our office a copy to be scanned into EMR.      Patient Active Problem List   Diagnosis    Smoker - pt is not ready to quit, aware of importance of tob cessation.  Due for CT lung screening next month, would like to do again this year.    History of ductal carcinoma in situ (DCIS) of breast - follows with Kayla Guerrero NP (now instead of [facility]).  Mammo UTD, gets MRIs 6 mos after mammo as well given CHEK2 genetic mutation.      Essential hypertension - compliant with meds, no new issues.    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  Breast imaging UTD.  Follows with Breast team Onc as well.    History of adenomatous polyp of colon - colonoscopy UTD    Colon, diverticulosis - stable with no new issues.    Internal hemorrhoids - stable with no new issues    Hemorrhoids, external without complications - stable with  no new issues.    Multiple lung nodules on CT - due for CT lung screening next month.    Seronegative rheumatoid arthritis (HCC) - follows with Rheum ([facility]), last note reviewed, compliant with meds.  Has had some issues with gout of late.    History of bladder cancer - Kajal did cystoscopy recently due to hematuria.  No evidence fo cancer    Senile osteoporosis - on Reclast per [facility], last infusion 6/24.  Last DEXA 4/23.    Idiopathic chronic gout of multiple sites with tophus - as per Rheum, Now on Allopurinol.    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       Allergies:  She is allergic to wellbutrin [bupropion].    Current Medications:  Outpatient Medications Marked as Taking for the 3/20/25 encounter (Office Visit) with Anya Santana PA-C   Medication Sig    ipratropium 0.06 % Nasal Solution 2 sprays by Nasal route 3 (three) times daily.    allopurinol 100 MG Oral Tab Take 1 tablet (100 mg total) by mouth daily for 30 days, THEN 2 tablets (200 mg  total) daily for 30 days, THEN 3 tablets (300 mg total) daily.    colchicine 0.6 MG Oral Tab Take 1 tablet (0.6 mg total) by mouth 2 (two) times daily.    methotrexate 2.5 MG Oral Tab Take 10 tablets (25 mg total) by mouth once a week.    Irbesartan 150 MG Oral Tab Take 1 tablet (150 mg total) by mouth daily.    folic acid 1 MG Oral Tab Take 3 tablets (3 mg total) by mouth daily.    Vitamin K 100 MCG Oral Tab     Vitamin D, Cholecalciferol, 50 MCG (2000 UT) Oral Cap     latanoprost 0.005 % Ophthalmic Solution INSTILL 1 DROP INTO AFFECTED EYES DAILY AT BEDTIME    acetaminophen 500 MG Oral Tab Take 1 tablet (500 mg total) by mouth every 6 (six) hours as needed for Pain. 750 mg twice a day (tylenol arthritis)    ibuprofen 200 MG Oral Tab Take 1 tablet (200 mg total) by mouth every 6 (six) hours as needed for Pain.    Multiple Vitamin (ONE-DAILY MULTI VITAMINS) Oral Tab Take 1 tablet by mouth daily.       Medical History:  She  has a past medical history of Arthritis (), Blood in urine (2023), Cancer (HCC), Dry eye (10/30/2014), Ductal carcinoma in situ of breast (2016), Essential hypertension, Exposure to medical diagnostic radiation (), Eye pressure, Glaucoma (), Hearing impairment, Hearing loss (), High blood pressure, Pain in joints (), Smoker (10/30/2014), Visual impairment, and Wears glasses ().  Surgical History:  She  has a past surgical history that includes lumpectomy left (Left, 2016); needle biopsy right (Right, 2015); radiation left (Left, ); sandra biopsy stereo nodule 1 site left (cpt=19081) (2016); colonoscopy; cataract (24  & 24); and .   Family History:  Her family history includes Arthritis in her sister; Breast Cancer (age of onset: 35) in her mother; Breast Cancer (age of onset: 49) in her sister; Breast Cancer (age of onset: 61) in her self; Cancer in her maternal grandmother and sister; Cancer (age of onset: 60) in her sister; DCIS in her  self; Heart Attack in her brother and father.  Social History:  She  reports that she has been smoking cigarettes. She has a 41 pack-year smoking history. She has been exposed to tobacco smoke. She has never used smokeless tobacco. She reports that she does not drink alcohol and does not use drugs.    Tobacco:  Social History     Tobacco Use   Smoking Status Every Day    Current packs/day: 1.00    Average packs/day: 1 pack/day for 41.0 years (41.0 ttl pk-yrs)    Types: Cigarettes    Passive exposure: Current   Smokeless Tobacco Never     E-Cigarettes/Vaping       Questions Responses    E-Cigarette Use Never User          E-Cigarette/Vaping Substances       Questions Responses    Nicotine No    THC No    CBD No    Flavoring No          E-Cigarette/Vaping Devices       Questions Responses    Disposable No    Pre-filled or Refillable Cartridge No    Refillable Tank No    Pre-filled Pod No           Tobacco cessation counseling for <3 minutes.      CAGE Alcohol Screen:   CAGE screening score of 0 on 3/16/2025, showing low risk of alcohol abuse.      Patient Care Team:  Jennifer Lynn MD as PCP - General (Internal Medicine)  Francisco Vance MD (Radiation Oncology)  Irineo Bledsoe, RN as Registered Nurse (Registered Nurse)  Kim Hernandez MD (Radiation Oncology)  Anya Santana PA-C (Physician Assistant)  Edita Lyn APRN as Registered Nurse (Nurse Practitioner Family)  Kimberly Abel, PT as Physical Therapist (Physical Therapy)  Leela Barragan, RN as Registered Nurse (Registered Nurse)    Review of Systems  GENERAL: feels well otherwise  SKIN: denies any unusual skin lesions  EYES: denies blurred vision or double vision  HEENT: denies nasal congestion, sinus pain or ST, persistent watery runny nose (using Flonase but does not feel like it is controlling)  LUNGS: denies shortness of breath with exertion  CARDIOVASCULAR: denies chest pain on exertion  GI: denies abdominal pain, denies heartburn  : denies  dysuria, vaginal discharge or itching, no complaint of urinary incontinence   MUSCULOSKELETAL: denies back pain  NEURO: denies headaches  PSYCHE: denies depression or anxiety  HEMATOLOGIC: denies hx of anemia  ENDOCRINE: denies thyroid history  ALL/ASTHMA: denies hx of allergy or asthma    Objective:   Physical Exam  Objective:   Physical Exam  General Appearance:  Alert, cooperative, no distress, appears stated age   Head:  Normocephalic, without obvious abnormality, atraumatic   Eyes:  PERRL, conjunctiva/corneas clear, EOM's intact both eyes   Ears:  Normal TM's and external ear canals, both ears   Nose: Nares normal, septum midline   Throat: Lips, mucosa, and tongue normal   Neck: Supple, symmetrical, trachea midline, no adenopathy;  no carotid bruit or JVD   Back:   Symmetric, no curvature   Lungs:   Clear to auscultation bilaterally, respirations unlabored   Heart:  Regular rate and rhythm, S1 and S2 normal, no murmur, rub, or gallop   Abdomen:   Soft, non-tender, bowel sounds active all four quadrants,  no masses, no organomegaly   Pelvic: Deferred   Extremities: Extremities normal, atraumatic, no cyanosis or edema   Pulses: 2+ and symmetric   Skin: Skin color, texture, turgor normal, no rashes or lesions   Lymph nodes: Cervical, supraclavicular nodes normal   Neurologic: Normal   Breast:  Deferred to breast surgery (has upcoming appt)      /72   Pulse 53   Temp 98 °F (36.7 °C) (Temporal)   Resp 16   Ht 5' 2\" (1.575 m)   Wt 110 lb (49.9 kg)   SpO2 96%   BMI 20.12 kg/m²  Estimated body mass index is 20.12 kg/m² as calculated from the following:    Height as of this encounter: 5' 2\" (1.575 m).    Weight as of this encounter: 110 lb (49.9 kg).    Medicare Hearing Assessment:   Hearing Screening    Time taken: 3/20/2025 10:05 AM  Screening Method: Finger Rub  Finger Rub Result: Pass (Comment: pt has hearing aide in L ear)               Assessment & Plan:   Mónica Wu is a 69 year old  female who presents for a Medicare Assessment.     1. Encounter for annual health examination (Primary) - Reviewed RSV, pt declined.  Encouraged regular CV and wt bearing exercise as able.     2. Essential hypertension - controlled on current meds, continue.  -     Expanded, Low Complexity (54490)  3. Seronegative rheumatoid arthritis (HCC) - compliant with meds, follows with Dr. Olmedo.  Uses Prednisone infrequently and sparingly.    -     Expanded, Low Complexity (78948)  4. History of ductal carcinoma in situ (DCIS) of breast - stable with no new issues, high risk given CHEK2 mutation, follows with Breast surgery, imaging UTD and pt is compliant with mammograms and MRIs.    -     Expanded, Low Complexity (38610)  5. History of bladder cancer - follows with Dr. Rdz, no new concerns or complaints.  Last cystoscopy was negative.  -     Expanded, Low Complexity (21118)  6. Multiple lung nodules on CT - Due soon for surveillance imaging, order provided and encouraged to complete.  -     Expanded, Low Complexity (56822)  -     CT LUNG LD SCREENING(CPT=71271); Future; Expected date: 03/20/2025  7. Senile osteoporosis - On Reclast per [facility], tolerating without issues at this point.  -     Expanded, Low Complexity (08569)  8. History of adenomatous polyp of colon - colonoscopy is UTD, no new issues/concerns.  Overview:  Adenoma (2020)  Orders:  -     Expanded, Low Complexity (73603)  9. Colon, diverticulosis - stable with no flares over the last year, will follow.  -     Expanded, Low Complexity (07781)  10. Hemorrhoids, external without complications - stable with no new concerns.  -     Expanded, Low Complexity (12262)  11. Internal hemorrhoids - stable with no new concerns.  -     Expanded, Low Complexity (27398)  12. Conductive hearing loss of left ear with unrestricted hearing of right ear - pt follows with specialist at tertiary care.  Has hearing aid for L.  No new complaints or issues.  -     Expanded, Low  Complexity (14537)  13. 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  UTD with mammo and complies with MRI breast as well.  No new concerns.    Overview:  The suspected deleterious CHEK2 gene mutation, c.444+1G>A, was upgraded to deleterious.  The variant of uncertain significance in CHRIS (c.4768C>T) was downgraded to likely benign  Next imaging due in Dec  Orders:  -     Expanded, Low Complexity (19633)  14. Idiopathic chronic gout of multiple sites with topAllon Therapeuticss - Working with [facility]io and is now being titrated up on Allopurinol.  Last Uric acid level was improved.    -     Expanded, Low Complexity (79887)  15. 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  Pt states was told \"no\" to RSV by Rheumatology.  -     Expanded, Low Complexity (32916)  16. Thrombocytosis - repeat CBC in 4-6 weeks.  -     CBC With Differential With Platelet; Future; Expected date: 04/03/2025  17. Lymphocytosis - repeat CBC in 4-6 weeks.  Could be from recent use of steroids.    -     CBC With Differential With Platelet; Future; Expected date: 04/03/2025  18. Screening for lung cancer - CT lung screening due soon.   Order provided.  -     CT LUNG LD SCREENING(CPT=71271); Future; Expected date: 03/20/2025  19. Personal history of nicotine dependence - check CT lung screening.  Pt declines referrals and additional help with tob cessation.    -     CT LUNG LD SCREENING(CPT=71271); Future; Expected date: 03/20/2025  20.  Chronic rhinitis - will try Atrovent instead of Flonase.  F/U if needed.  -     Ipratropium Bromide; 2 sprays by Nasal route 3 (three) times daily.  Dispense: 15 mL; Refill: 5    The patient indicates understanding of these issues and agrees to the plan.  Reinforced healthy diet, lifestyle, and exercise.      No follow-ups on file.     Anya Santana PA-C, 3/17/2025     Supplementary  Documentation:   General Health:  In the past six months, have you lost more than 10 pounds without trying?: (Patient-Rptd) 2 - No  Has your appetite been poor?: (Patient-Rptd) No  Type of Diet: (Patient-Rptd) Balanced  How would you describe your daily physical activity?: (Patient-Rptd) Light  How would you describe your current health state?: (Patient-Rptd) Good  How do you maintain positive mental well-being?: (Patient-Rptd) Social Interaction, Games, Visiting Friends, Visiting Family  On a scale of 0 to 10, with 0 being no pain and 10 being severe pain, what is your pain level?: (Patient-Rptd) 3 - (Mild)  In the past six months, have you experienced urine leakage?: (Patient-Rptd) 0-No  At any time do you feel concerned for the safety/well-being of yourself and/or your children, in your home or elsewhere?: (Patient-Rptd) No  Have you had any immunizations at another office such as Influenza, Hepatitis B, Tetanus, or Pneumococcal?: (Patient-Rptd) No    Health Maintenance   Topic Date Due    Annual Depression Screening  01/01/2025    Tobacco Cessation Counseling  01/01/2025    Annual Physical  03/27/2025    Lung Cancer Screening  04/17/2025    COVID-19 Vaccine (5 - 2024-25 season) 04/30/2025    Pap Smear  01/27/2026    Mammogram  02/13/2026    Colorectal Cancer Screening  08/07/2028    Influenza Vaccine  Completed    DEXA Scan  Completed    Fall Risk Screening (Annual)  Completed    Pneumococcal Vaccine: 50+ Years  Completed    Zoster Vaccines  Completed    Meningococcal B Vaccine  Aged Out

## 2025-03-20 ENCOUNTER — OFFICE VISIT (OUTPATIENT)
Dept: INTERNAL MEDICINE CLINIC | Facility: CLINIC | Age: 70
End: 2025-03-20
Payer: MEDICARE

## 2025-03-20 VITALS
SYSTOLIC BLOOD PRESSURE: 126 MMHG | BODY MASS INDEX: 20.24 KG/M2 | HEIGHT: 62 IN | WEIGHT: 110 LBS | HEART RATE: 53 BPM | RESPIRATION RATE: 16 BRPM | DIASTOLIC BLOOD PRESSURE: 72 MMHG | TEMPERATURE: 98 F | OXYGEN SATURATION: 96 %

## 2025-03-20 DIAGNOSIS — Z12.2 SCREENING FOR LUNG CANCER: ICD-10-CM

## 2025-03-20 DIAGNOSIS — M1A.09X1 IDIOPATHIC CHRONIC GOUT OF MULTIPLE SITES WITH TOPHUS: ICD-10-CM

## 2025-03-20 DIAGNOSIS — D72.820 LYMPHOCYTOSIS: ICD-10-CM

## 2025-03-20 DIAGNOSIS — D84.821 IMMUNODEFICIENCY DUE TO DRUG THERAPY (HCC): ICD-10-CM

## 2025-03-20 DIAGNOSIS — Z87.891 PERSONAL HISTORY OF NICOTINE DEPENDENCE: ICD-10-CM

## 2025-03-20 DIAGNOSIS — H90.12 CONDUCTIVE HEARING LOSS OF LEFT EAR WITH UNRESTRICTED HEARING OF RIGHT EAR: ICD-10-CM

## 2025-03-20 DIAGNOSIS — K64.4 HEMORRHOIDS, EXTERNAL WITHOUT COMPLICATIONS: ICD-10-CM

## 2025-03-20 DIAGNOSIS — D75.839 THROMBOCYTOSIS: ICD-10-CM

## 2025-03-20 DIAGNOSIS — M06.00 SERONEGATIVE RHEUMATOID ARTHRITIS (HCC): ICD-10-CM

## 2025-03-20 DIAGNOSIS — Z86.0101 HISTORY OF ADENOMATOUS POLYP OF COLON: ICD-10-CM

## 2025-03-20 DIAGNOSIS — Z79.899 IMMUNODEFICIENCY DUE TO DRUG THERAPY (HCC): ICD-10-CM

## 2025-03-20 DIAGNOSIS — I10 ESSENTIAL HYPERTENSION: ICD-10-CM

## 2025-03-20 DIAGNOSIS — Z00.00 ENCOUNTER FOR ANNUAL HEALTH EXAMINATION: Primary | ICD-10-CM

## 2025-03-20 DIAGNOSIS — Z15.01 BIALLELIC MUTATION OF CHEK2 GENE: ICD-10-CM

## 2025-03-20 DIAGNOSIS — J31.0 CHRONIC RHINITIS: ICD-10-CM

## 2025-03-20 DIAGNOSIS — Z86.000 HISTORY OF DUCTAL CARCINOMA IN SITU (DCIS) OF BREAST: ICD-10-CM

## 2025-03-20 DIAGNOSIS — R91.8 MULTIPLE LUNG NODULES ON CT: ICD-10-CM

## 2025-03-20 DIAGNOSIS — M81.0 SENILE OSTEOPOROSIS: ICD-10-CM

## 2025-03-20 DIAGNOSIS — Z15.89 BIALLELIC MUTATION OF CHEK2 GENE: ICD-10-CM

## 2025-03-20 DIAGNOSIS — K57.30 COLON, DIVERTICULOSIS: ICD-10-CM

## 2025-03-20 DIAGNOSIS — Z85.51 HISTORY OF BLADDER CANCER: ICD-10-CM

## 2025-03-20 DIAGNOSIS — Z15.09 BIALLELIC MUTATION OF CHEK2 GENE: ICD-10-CM

## 2025-03-20 DIAGNOSIS — K64.8 INTERNAL HEMORRHOIDS: ICD-10-CM

## 2025-03-20 PROCEDURE — 99499 UNLISTED E&M SERVICE: CPT | Performed by: PHYSICIAN ASSISTANT

## 2025-03-20 PROCEDURE — 99213 OFFICE O/P EST LOW 20 MIN: CPT | Performed by: PHYSICIAN ASSISTANT

## 2025-03-20 PROCEDURE — G0439 PPPS, SUBSEQ VISIT: HCPCS | Performed by: PHYSICIAN ASSISTANT

## 2025-03-20 RX ORDER — IPRATROPIUM BROMIDE 42 UG/1
2 SPRAY, METERED NASAL 3 TIMES DAILY
Qty: 15 ML | Refills: 5 | Status: SHIPPED | OUTPATIENT
Start: 2025-03-20

## 2025-03-27 DIAGNOSIS — M1A.09X1 IDIOPATHIC CHRONIC GOUT OF MULTIPLE SITES WITH TOPHUS: Primary | ICD-10-CM

## 2025-03-27 DIAGNOSIS — M06.00 SERONEGATIVE RHEUMATOID ARTHRITIS (HCC): ICD-10-CM

## 2025-03-27 RX ORDER — ALLOPURINOL 100 MG/1
TABLET ORAL
Qty: 90 TABLET | Refills: 1 | Status: SHIPPED | OUTPATIENT
Start: 2025-03-27

## 2025-03-27 NOTE — TELEPHONE ENCOUNTER
Last office visit: 1/28/2025 video visit     Next Rheum Apt:4/16/2025 Isaias Olmedo MD    Last fill: allopurinol 100 mg    1/28/2025   180 tabs      Labs:   Lab Results   Component Value Date    CREATSERUM 0.70 03/10/2025     11/15/2016    ALKPHO 75 03/10/2025    AST 21 03/10/2025    ALT 27 03/10/2025    BILT 0.7 03/10/2025    TP 7.1 03/10/2025    ALB 4.6 03/10/2025       Lab Results   Component Value Date    WBC 13.0 (H) 03/10/2025    HGB 13.4 03/10/2025    .0 (H) 03/10/2025    NEPRELIM 7.25 03/10/2025    NEPERCENT 55.9 03/10/2025    LYPERCENT 36.0 03/10/2025    NE 7.25 03/10/2025    LYMABS 4.66 (H) 03/10/2025

## 2025-04-03 ENCOUNTER — LAB ENCOUNTER (OUTPATIENT)
Dept: LAB | Age: 70
End: 2025-04-03
Attending: PHYSICIAN ASSISTANT
Payer: MEDICARE

## 2025-04-03 DIAGNOSIS — D75.839 THROMBOCYTOSIS: Primary | ICD-10-CM

## 2025-04-03 DIAGNOSIS — D72.820 LYMPHOCYTOSIS: ICD-10-CM

## 2025-04-03 DIAGNOSIS — D75.839 THROMBOCYTOSIS: ICD-10-CM

## 2025-04-03 LAB
BASOPHILS # BLD AUTO: 0.09 X10(3) UL (ref 0–0.2)
BASOPHILS NFR BLD AUTO: 0.9 %
EOSINOPHIL # BLD AUTO: 0.23 X10(3) UL (ref 0–0.7)
EOSINOPHIL NFR BLD AUTO: 2.2 %
ERYTHROCYTE [DISTWIDTH] IN BLOOD BY AUTOMATED COUNT: 14.4 %
HCT VFR BLD AUTO: 37.8 %
HGB BLD-MCNC: 12.6 G/DL
IMM GRANULOCYTES # BLD AUTO: 0.03 X10(3) UL (ref 0–1)
IMM GRANULOCYTES NFR BLD: 0.3 %
LYMPHOCYTES # BLD AUTO: 2.11 X10(3) UL (ref 1–4)
LYMPHOCYTES NFR BLD AUTO: 20.4 %
MCH RBC QN AUTO: 33.5 PG (ref 26–34)
MCHC RBC AUTO-ENTMCNC: 33.3 G/DL (ref 31–37)
MCV RBC AUTO: 100.5 FL
MONOCYTES # BLD AUTO: 1.17 X10(3) UL (ref 0.1–1)
MONOCYTES NFR BLD AUTO: 11.3 %
NEUTROPHILS # BLD AUTO: 6.71 X10 (3) UL (ref 1.5–7.7)
NEUTROPHILS # BLD AUTO: 6.71 X10(3) UL (ref 1.5–7.7)
NEUTROPHILS NFR BLD AUTO: 64.9 %
PLATELET # BLD AUTO: 482 10(3)UL (ref 150–450)
RBC # BLD AUTO: 3.76 X10(6)UL
WBC # BLD AUTO: 10.3 X10(3) UL (ref 4–11)

## 2025-04-03 PROCEDURE — 85025 COMPLETE CBC W/AUTO DIFF WBC: CPT

## 2025-04-03 PROCEDURE — 36415 COLL VENOUS BLD VENIPUNCTURE: CPT

## 2025-04-10 ENCOUNTER — OFFICE VISIT (OUTPATIENT)
Dept: INTERNAL MEDICINE CLINIC | Facility: CLINIC | Age: 70
End: 2025-04-10
Payer: MEDICARE

## 2025-04-10 VITALS
RESPIRATION RATE: 16 BRPM | DIASTOLIC BLOOD PRESSURE: 62 MMHG | OXYGEN SATURATION: 97 % | WEIGHT: 109 LBS | BODY MASS INDEX: 20.06 KG/M2 | HEART RATE: 86 BPM | HEIGHT: 62 IN | SYSTOLIC BLOOD PRESSURE: 118 MMHG | TEMPERATURE: 97 F

## 2025-04-10 DIAGNOSIS — J01.00 ACUTE NON-RECURRENT MAXILLARY SINUSITIS: Primary | ICD-10-CM

## 2025-04-10 DIAGNOSIS — H69.93 DYSFUNCTION OF BOTH EUSTACHIAN TUBES: ICD-10-CM

## 2025-04-10 DIAGNOSIS — I10 ESSENTIAL HYPERTENSION: ICD-10-CM

## 2025-04-10 PROCEDURE — 99213 OFFICE O/P EST LOW 20 MIN: CPT

## 2025-04-10 PROCEDURE — G2211 COMPLEX E/M VISIT ADD ON: HCPCS

## 2025-04-10 RX ORDER — TIMOLOL MALEATE 5 MG/ML
SOLUTION/ DROPS OPHTHALMIC
COMMUNITY
Start: 2025-03-30

## 2025-04-10 NOTE — PROGRESS NOTES
Mónica Wu is a 69 year old female.   Chief Complaint   Patient presents with    Sinusitis     Yellow phlegm ,headaches for 10 days.     HPI:      History of Present Illness  Mónica Wu is a 69 year old female who presents with sinus congestion and ear blockage.    Symptoms began 10 days ago with sinus congestion and frequent nose blowing. Initially manageable, the symptoms have progressively worsened. This morning, she woke up with both ears clogged and a pounding headache that has persisted for the past couple of days. The congestion drains into her throat, causing coughing, and it disrupts her sleep. No shortness of breath or changes in breathing.    She is currently taking Zyrtec daily. She is on methotrexate, which she holds during antibiotic treatment.       Allergies:  Allergies[1]   Current Meds:  Current Medications[2]     PMH:   Past Medical History[3]    ROS:   Review of Systems   Constitutional: Negative.    HENT:  Positive for congestion, sinus pressure, sinus pain and sore throat.    Respiratory:  Positive for cough.    Cardiovascular: Negative.    Gastrointestinal: Negative.    Neurological:  Positive for headaches.        PHYSICAL EXAM:    /62   Pulse 86   Temp 96.9 °F (36.1 °C) (Temporal)   Resp 16   Ht 5' 2\" (1.575 m)   Wt 109 lb (49.4 kg)   SpO2 97%   BMI 19.94 kg/m²   Physical Exam  Constitutional:       Appearance: Normal appearance. She is not toxic-appearing.   HENT:      Nose: Congestion and rhinorrhea present.      Mouth/Throat:      Mouth: Mucous membranes are moist.      Pharynx: Posterior oropharyngeal erythema present.   Pulmonary:      Effort: Pulmonary effort is normal.      Breath sounds: Normal breath sounds.   Neurological:      Mental Status: She is alert.          ASSESSMENT/ PLAN:     Assessment & Plan  Sinusitis with Eustachian tube dysfunction  Sinusitis with nasal congestion, sinus pressure, post-nasal drip, clogged ears, and headache.  Augmentin recommended.  - Advise taking Augmentin with food to minimize stomach upset.  - Continue Zyrtec daily.  - Use Atrovent nasal spray as directed.  - Provide a sample of nasal rinse and advise using filtered or bottled water.     Hypertension  Blood pressure well-controlled at 118/62 mmHg.  - Continue current antihypertensive regimen.       Health Maintenance Due   Topic Date Due    COVID-19 Vaccine (5 - 2024-25 season) 04/30/2025            The following individual(s) verbally consented to be recorded using ambient AI listening technology and understand that they can each withdraw their consent to this listening technology at any point by asking the clinician to turn off or pause the recording:    Patient name: Mónica Wu      Pt indicates understanding and agrees to the plan.     No follow-ups on file.    SHELDON Meyers          [1]   Allergies  Allergen Reactions    Wellbutrin [Bupropion] OTHER (SEE COMMENTS)     Tremors/shakes   [2]   Current Outpatient Medications   Medication Sig Dispense Refill    timolol 0.5 % Ophthalmic Solution       amoxicillin clavulanate 875-125 MG Oral Tab Take 1 tablet by mouth 2 (two) times daily for 10 days. 20 tablet 0    allopurinol 100 MG Oral Tab 3 tablets by mouth daily 90 tablet 1    ipratropium 0.06 % Nasal Solution 2 sprays by Nasal route 3 (three) times daily. 15 mL 5    colchicine 0.6 MG Oral Tab Take 1 tablet (0.6 mg total) by mouth 2 (two) times daily. 180 tablet 1    methotrexate 2.5 MG Oral Tab Take 10 tablets (25 mg total) by mouth once a week. 130 tablet 0    Irbesartan 150 MG Oral Tab Take 1 tablet (150 mg total) by mouth daily. 90 tablet 3    folic acid 1 MG Oral Tab Take 3 tablets (3 mg total) by mouth daily. 270 tablet 3    Vitamin K 100 MCG Oral Tab       Vitamin D, Cholecalciferol, 50 MCG (2000 UT) Oral Cap       latanoprost 0.005 % Ophthalmic Solution INSTILL 1 DROP INTO AFFECTED EYES DAILY AT BEDTIME      acetaminophen 500 MG Oral Tab  Take 1 tablet (500 mg total) by mouth every 6 (six) hours as needed for Pain. 750 mg twice a day (tylenol arthritis)      ibuprofen 200 MG Oral Tab Take 1 tablet (200 mg total) by mouth every 6 (six) hours as needed for Pain.      Multiple Vitamin (ONE-DAILY MULTI VITAMINS) Oral Tab Take 1 tablet by mouth daily.     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

## 2025-04-16 ENCOUNTER — OFFICE VISIT (OUTPATIENT)
Dept: RHEUMATOLOGY | Facility: CLINIC | Age: 70
End: 2025-04-16
Payer: MEDICARE

## 2025-04-16 VITALS
BODY MASS INDEX: 19.88 KG/M2 | OXYGEN SATURATION: 96 % | SYSTOLIC BLOOD PRESSURE: 120 MMHG | TEMPERATURE: 98 F | HEIGHT: 62 IN | HEART RATE: 87 BPM | WEIGHT: 108 LBS | DIASTOLIC BLOOD PRESSURE: 62 MMHG | RESPIRATION RATE: 16 BRPM

## 2025-04-16 DIAGNOSIS — Z51.81 THERAPEUTIC DRUG MONITORING: ICD-10-CM

## 2025-04-16 DIAGNOSIS — Z79.899 IMMUNODEFICIENCY DUE TO DRUG THERAPY (HCC): ICD-10-CM

## 2025-04-16 DIAGNOSIS — D84.821 IMMUNODEFICIENCY DUE TO DRUG THERAPY (HCC): ICD-10-CM

## 2025-04-16 DIAGNOSIS — M06.00 SERONEGATIVE RHEUMATOID ARTHRITIS (HCC): ICD-10-CM

## 2025-04-16 DIAGNOSIS — M81.0 AGE-RELATED OSTEOPOROSIS WITHOUT CURRENT PATHOLOGICAL FRACTURE: ICD-10-CM

## 2025-04-16 DIAGNOSIS — M1A.09X1 IDIOPATHIC CHRONIC GOUT OF MULTIPLE SITES WITH TOPHUS: Primary | ICD-10-CM

## 2025-04-16 PROCEDURE — G2211 COMPLEX E/M VISIT ADD ON: HCPCS | Performed by: INTERNAL MEDICINE

## 2025-04-16 PROCEDURE — 99214 OFFICE O/P EST MOD 30 MIN: CPT | Performed by: INTERNAL MEDICINE

## 2025-04-16 RX ORDER — METHOTREXATE 2.5 MG/1
25 TABLET ORAL WEEKLY
Qty: 130 TABLET | Refills: 0 | Status: SHIPPED | OUTPATIENT
Start: 2025-04-16 | End: 2025-07-16

## 2025-04-16 RX ORDER — ALLOPURINOL 300 MG/1
300 TABLET ORAL DAILY
Qty: 90 TABLET | Refills: 3 | Status: SHIPPED | OUTPATIENT
Start: 2025-04-16

## 2025-04-16 NOTE — PROGRESS NOTES
The following individual(s) verbally consented to be recorded using ambient AI listening technology and understand that they can each withdraw their consent to this listening technology at any point by asking the clinician to turn off or pause the recording:    Patient name: Mónica Wu  Additional names:

## 2025-04-16 NOTE — PROGRESS NOTES
Rheumatology f/u Patient Note  =====================================================================================================    Chief complaint: crystalline arthritis/seronegative rheumatoid arthritis    Chief Complaint   Patient presents with    Follow - Up     LOV:1/13/25  Hasn't been feeling good but better the last 2 days. Had a flare 2 weeks ago. Had labs. Labs are \"off\".     Ortho:   Kai Bishop MD    PCP  Jennifer Lynn MD  Fax: [phone]  Phone: [phone]  =====================================================================================================  HPI   Date of visit: 3/20/2023  Mónica Wu is a 69 year old female   Here for further evaluation of inflammatory arthritis.  -PMHx: Non-invasive low grade papillary urothelial carcinoma, chronic smoker  -Patient reports chronic arthralgia in the ankles for over 20 years.  More recently she has had persistent right knee swelling for which she underwent evaluation by orthopedic surgery (Dr. Bishop).  30 cc of inflammatory fluid was aspirated from the right knee and December 2022.  Corticosteroid injection provided at the time led to 2 months of complete relief with partial recurrence of symptoms in the last couple months or so.  -She tried meloxicam 15 mg daily which helped with arthralgia significantly but she ended up with dyspepsia so she returned to her usual ibuprofen regimen.  -day pain is the worst  -dry eye: Previously using Restasis but cannot afford it given several $100 co-pay.  -sister and grandmother with RA.  -No family history of gout or any other autoimmune disease.  Medications:  Ibuprofen 200 mg every 2 hours, takes 8-12 each day.   ==============================================================================================================  Visit: 01/28/25  Doing a bit better since last visit.  Here to discuss dual-energy CT scan of the knee.  Continues on colchicine and  methotrexate  ==============================================================================================================  Today's Visit: 04/16/25    She has experienced a recent flare-up of gout affecting her right knee, characterized by significant pain and swelling that disrupted her sleep for three nights. She managed the flare-up with prednisone. Her current medication regimen includes methotrexate 25 mg weekly, colchicine 0.6 mg twice daily, and allopurinol 300 mg daily for gout management.     She has chronic issues with her knees, noting persistent swelling and long-standing problems. Despite previous fluid drainage from the knee, no gout crystals were found, suggesting the crystals might be in the tendons rather than the joint fluid. She experiences significant pain and difficulty walking, stating 'I can't walk much on this.' Her history of arthritis includes a CT scan showing significant arthritis and cartilage tear in the knee. She has previously undergone physical therapy without improvement and does not perform exercises at home due to frustration. She wants to walk more, stating 'I would really like to go for a walk around the block.'    She smokes a pack of cigarettes a day and wants to cut down, though she has not sought assistance for smoking cessation.      5 point ROS negative except noted above  I had reviewed past medical and family histories together with allergy and medication lists documented.      Medications:  Current Outpatient Medications on File Prior to Visit   Medication Sig Dispense Refill    Acetaminophen (TYLENOL ARTHRITIS PAIN OR) Take 650 mg by mouth in the morning and 650 mg before bedtime. (Patient taking differently: Take 1,300 mg by mouth in the morning and 1,300 mg before bedtime.)      timolol 0.5 % Ophthalmic Solution       amoxicillin clavulanate 875-125 MG Oral Tab Take 1 tablet by mouth 2 (two) times daily for 10 days. 20 tablet 0    ipratropium 0.06 % Nasal Solution  2 sprays by Nasal route 3 (three) times daily. 15 mL 5    colchicine 0.6 MG Oral Tab Take 1 tablet (0.6 mg total) by mouth 2 (two) times daily. 180 tablet 1    Irbesartan 150 MG Oral Tab Take 1 tablet (150 mg total) by mouth daily. 90 tablet 3    folic acid 1 MG Oral Tab Take 3 tablets (3 mg total) by mouth daily. 270 tablet 3    Vitamin K 100 MCG Oral Tab       Vitamin D, Cholecalciferol, 50 MCG (2000 UT) Oral Cap       latanoprost 0.005 % Ophthalmic Solution INSTILL 1 DROP INTO AFFECTED EYES DAILY AT BEDTIME      Multiple Vitamin (ONE-DAILY MULTI VITAMINS) Oral Tab Take 1 tablet by mouth daily.      acetaminophen 500 MG Oral Tab Take 1 tablet (500 mg total) by mouth every 6 (six) hours as needed for Pain. 750 mg twice a day (tylenol arthritis) (Patient not taking: Reported on 4/16/2025)      ibuprofen 200 MG Oral Tab Take 1 tablet (200 mg total) by mouth every 6 (six) hours as needed for Pain. (Patient not taking: Reported on 4/16/2025)       No current facility-administered medications on file prior to visit.     ?  Allergies:  Allergies   Allergen Reactions    Wellbutrin [Bupropion] OTHER (SEE COMMENTS)     Tremors/shakes       Objective    Vitals:    04/16/25 1058   BP: 120/62   BP Location: Right arm   Patient Position: Sitting   Cuff Size: adult   Pulse: 87   Resp: 16   Temp: 98 °F (36.7 °C)   TempSrc: Temporal   SpO2: 96%   Weight: 108 lb 0.4 oz (49 kg)   Height: 5' 2\" (1.575 m)     GEN: NAD, well-nourished.   HEENT: Head: NCAT. Face: No lesions. Eyes: Conjunctiva clear.   PULM:  easy effort  Extremities: No cyanosis, edema or deformities.   Neurologic: Strength, CN2-12 grossly intact   Skin: No lesions or rashes.  MSK: 28 joint count performed. No evidence of synovitis in mcp, pip, dip, wrist, elbows, shoulders, hips, knees, ankles, mtp unless otherwise noted. Full ROM of elbows, wrists, knees.     No swollen or tender joints today, DIP/PIP tophi appear to be improving  - Slightly antalgic gait with right  lateral knee osteochondral hypertrophy      Labs:    Lab Results   Component Value Date    URIC 3.3 03/10/2025    URIC 4.4 03/22/2023       Lab Results   Component Value Date    WBC 10.3 04/03/2025    RBC 3.76 (L) 04/03/2025    HGB 12.6 04/03/2025    HCT 37.8 04/03/2025    .0 (H) 04/03/2025    .5 (H) 04/03/2025    MCH 33.5 04/03/2025    MCHC 33.3 04/03/2025    RDW 14.4 04/03/2025    NEPRELIM 6.71 04/03/2025    NEPERCENT 64.9 04/03/2025    LYPERCENT 20.4 04/03/2025    MOPERCENT 11.3 04/03/2025    EOPERCENT 2.2 04/03/2025    BAPERCENT 0.9 04/03/2025    NE 6.71 04/03/2025    LYMABS 2.11 04/03/2025    MOABSO 1.17 (H) 04/03/2025    EOABSO 0.23 04/03/2025    BAABSO 0.09 04/03/2025     Lab Results   Component Value Date    GLU 71 03/10/2025    BUN 14 03/10/2025    BUNCREA 18.0 07/26/2021    CREATSERUM 0.70 03/10/2025    ANIONGAP 12 03/10/2025     11/15/2016    GFRNAA 101 01/26/2022    GFRAA 116 01/26/2022    CA 10.0 03/10/2025    OSMOCALC 285 03/10/2025    ALKPHO 75 03/10/2025    AST 21 03/10/2025    ALT 27 03/10/2025    BILT 0.7 03/10/2025    TP 7.1 03/10/2025    ALB 4.6 03/10/2025    GLOBULIN 2.5 03/10/2025     03/10/2025    K 4.3 03/10/2025     03/10/2025    CO2 24.0 03/10/2025       12/2022 Right knee synovial fluid   WBC 8930, percent neutrophils 94%, 2% lymphocytes.  No crystals.    2/2023  Creatinine 0.48, rest of CMP WNL  CBC W differential WNL    3/2023  Sed rate 44  CRP 0.87 mg/DL  Hepatitis B/C WNL  Quant gold WNL  Ferritin 61.4  Mag/Phos WNL  PTH 55.4  RF, CCP is negative  LILY is negative  Uric acid 4.4  TSH WNL  Additional Labs:    Radiology:    2023 DEXA:   LUMBAR SPINE ANALYSIS RESULTS:      Bone mineral density (BMD) (g/cm2):  0.894    Lumbar T-Score:  -1.4      % young normals:  85      % age matched controls:  105      Change from prior spine examination:  0.4%               TOTAL HIP ANALYSIS RESULTS:        Bone mineral density (BMD) (g/cm2):  0.635      Total Hip  T-Score:  -2.5      % young normals:  67      % age matched controls:  80      Change from prior hip examination:  -8.6*%               FEMORAL NECK ANALYSIS RESULTS:        Bone mineral density (BMD) (g/cm2):  0.498      Femoral neck T-Score:  -3.2      % young normals:  59      % age matched controls:  73      Change from prior hip examination:  -27.8*%           Radiology review:  CT KNEE RIGHT (MIT=[id number])    Result Date: 1/27/2025  CONCLUSION:  1. There is advanced osteoarthritis in the knee particularly involving the lateral compartment. 2. Scattered areas of uric acid crystal are suggested along tendons.  This is favored to be artifactual.  There is no significant uric acid deposition within the joint.  There are no typical bone erosions. 3. There is a mild joint effusion.    LOCATION:  [facility]   Dictated by (CST): Lisandro Lopez MD on 1/27/2025 at 9:12 AM     Finalized by (CST): Lisandro Lopez MD on 1/27/2025 at 9:16 AM       XR HAND BILAT (MIN 3 VIEWS) (CPT=73130-50)    Result Date: 1/24/2025  CONCLUSION:   1. Severe degenerative changes of the DIP joints right hand 2nd and 3rd digit.  More mild degenerative changes at the base of the thumb.  2. 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  There is moderate degenerative changes of the base of the thumb with evidence of old traumatic injury.   LOCATION:  [facility]   Dictated by (CST): David Ocasio MD on 1/24/2025 at 5:41 PM     Finalized by (CST): David Ocasio MD on 1/24/2025 at 5:44 PM      =====================================================================================================  Assessment and Plan  Assessment:  1. Idiopathic chronic gout of multiple sites with tophus    2. Therapeutic drug monitoring    3. Seronegative rheumatoid arthritis (HCC)    4. 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 Right knee synovial fluid aspirate in 12/2022 consistent with active inflammation, but no MSU crystals.  Chondrocalcinosis in the left knee is noted on review of prior plain radiographs.   --PIPs/DIPs, ankle effusions, right knee and knee effusion had previously significantly improved with colchicine monotherapy.  DIP nodules likely represent tophi  --Significant improvement with addition of urate lowering therapy initiated in January 2025    #Right knee osteoarthritis  - I suspect significant right lateral knee compartment OA is contributing to the bulk of the patient's persistent knee pain  - Residual gout is likely contributing as well    #Osteoporosis: No fracture history.  Risk factors include smoking, low calcium intake  -Reclast: 6/19/2024    High risk medication labs including CMP and CBC w/ diff reviewed from 3/2025. Results are stable. HBsAg, HBcAB total negative, HCV neg, IGRA neg in 3/2023  -patient does not drink etoh  -Sees dentist regularly.  Teeth are good.    Plan:  Continue every 3-month blood monitoring while on methotrexate.  Continue every 6-month CK  - Osteoporosis labs in June 2025.  Due for second dose of Reclast afterwards  - Continue calcium and vitamin D    Allopurinol: change to 300 mg tablet    Methotrexate continue 10 pills weekly  -Friday night: 5 pills  -Saturday night: 5 pills    -Continue colchicine 0.6 mg twice daily    Prednisone taper given (only as needed for gout flares):    6 tabs (30 mg) daily for 2 days  THEN 4 tabs (20 mg) daily for 2 days,   THEN 3 tabs (15 mg) daily for 2 day  THEN 2 tabs (10 mg ) daily for 2 days  THEN 1 tablet (5 mg total) daily for 2 days.    Recommend quit smoking.  Provide quitting smoking resource of the patient.  - Discussed that she may need a knee replacement in the future.  But she ought  to quit smoking before any surgery to optimize wound healing    Rtc Sept 2025    Immunization History   Administered Date(s) Administered    Covid-19 Vaccine Pfizer 30 mcg/0.3 ml 03/26/2021, 04/16/2021, 12/30/2021    FLU VAC High Dose 65 YRS & Older PRSV Free (80273) 10/27/2023    Influenza 10/31/2024    Pfizer Covid-19 Vaccine 30mcg/0.3ml 12yrs+ 10/31/2024    Pneumococcal Conjugate PCV20 09/28/2023    Pneumovax 23 01/22/2020    TDAP 05/08/2024    Zoster Vaccine Recombinant Adjuvanted (Shingrix) 11/25/2023, 04/10/2024         Diagnoses and all orders for this visit:    Idiopathic chronic gout of multiple sites with tophus  -     allopurinol 300 MG Oral Tab; Take 1 tablet (300 mg total) by mouth daily.  -     Comp Metabolic Panel (14); Future  -     CBC With Differential With Platelet; Future  -     Uric Acid; Future  -     CK (Creatine Kinase) (Not Creatinine); Future  -     Comp Metabolic Panel (14); Future  -     CBC With Differential With Platelet; Future  -     CK (Creatine Kinase) (Not Creatinine); Future  -     methotrexate 2.5 MG Oral Tab; Take 10 tablets (25 mg total) by mouth once a week.    Therapeutic drug monitoring  -     Comp Metabolic Panel (14); Future  -     CBC With Differential With Platelet; Future  -     CK (Creatine Kinase) (Not Creatinine); Future  -     methotrexate 2.5 MG Oral Tab; Take 10 tablets (25 mg total) by mouth once a week.    Seronegative rheumatoid arthritis (HCC)  -     methotrexate 2.5 MG Oral Tab; Take 10 tablets (25 mg total) by mouth once a week.    Age-related osteoporosis without current pathological fracture  -     Phosphorus; Future  -     Magnesium; Future  -     Vitamin D; Future                No follow-ups on file.      The above plan of care, diagnosis, orders, and follow-up were discussed with the patient. Questions related to this recommended plan of care were answered.    Thank you for referring this delightful patient to me. Please feel free to contact me with  any questions.     This report was performed utilizing speech recognition software technology. Despite proofreading, speech recognition errors could escape detection. If a word or phrase is confusing or out of context, please do not hesitate to call for   clarification.       Kind regards      Isaisa Olmedo MD  [facility]

## 2025-04-16 NOTE — PATIENT INSTRUCTIONS
Will schedule you for Reclast at the end of June 2025.    If you are a current smoker and want to quit, the Smoking Cessation Clinic at [facility] is here to help. The program combines medical and behavioral therapy, and includes counseling with a nurse practitioner (Radha Mehta RN, APN-BC, AOCN or Galina Clay RN, APN-BC) virtually or at one of our cancer centers in [place] or [place]. To schedule a smoking cessation appointment, call [phone] ([facility]).  You can also ask your primary care physician about medications for smoking cessation. Need a primary care physician? Call our physician referral at [phone].  Also try  1-800-QUIT-NOW.  National Quit smoking hotline

## 2025-04-17 ENCOUNTER — OFFICE VISIT (OUTPATIENT)
Dept: SURGERY | Facility: CLINIC | Age: 70
End: 2025-04-17
Payer: MEDICARE

## 2025-04-17 VITALS
TEMPERATURE: 97 F | DIASTOLIC BLOOD PRESSURE: 81 MMHG | RESPIRATION RATE: 16 BRPM | OXYGEN SATURATION: 98 % | WEIGHT: 107 LBS | BODY MASS INDEX: 20 KG/M2 | SYSTOLIC BLOOD PRESSURE: 122 MMHG

## 2025-04-17 DIAGNOSIS — C50.919 CHEK2-RELATED BREAST CANCER (HCC): ICD-10-CM

## 2025-04-17 DIAGNOSIS — M11.20 CHONDROCALCINOSIS: ICD-10-CM

## 2025-04-17 DIAGNOSIS — M06.00 SERONEGATIVE RHEUMATOID ARTHRITIS (HCC): Primary | ICD-10-CM

## 2025-04-17 DIAGNOSIS — M25.50 ARTHRALGIA, UNSPECIFIED JOINT: ICD-10-CM

## 2025-04-17 DIAGNOSIS — Z15.09 CHEK2-RELATED BREAST CANCER (HCC): ICD-10-CM

## 2025-04-17 DIAGNOSIS — Z15.02 CHEK2-RELATED BREAST CANCER (HCC): ICD-10-CM

## 2025-04-17 DIAGNOSIS — Z12.31 SCREENING MAMMOGRAM FOR BREAST CANCER: Primary | ICD-10-CM

## 2025-04-17 DIAGNOSIS — M10.9 GOUT, UNSPECIFIED CAUSE, UNSPECIFIED CHRONICITY, UNSPECIFIED SITE: ICD-10-CM

## 2025-04-17 DIAGNOSIS — D05.12 INTRADUCTAL CARCINOMA IN SITU OF LEFT BREAST: ICD-10-CM

## 2025-04-17 DIAGNOSIS — Z15.89 CHEK2-RELATED BREAST CANCER (HCC): ICD-10-CM

## 2025-04-17 RX ORDER — FOLIC ACID 1 MG/1
3 TABLET ORAL DAILY
Qty: 270 TABLET | Refills: 3 | Status: SHIPPED | OUTPATIENT
Start: 2025-04-17

## 2025-04-17 NOTE — TELEPHONE ENCOUNTER
Last office visit: 4/16/2025    Next Rheum Apt:9/16/2025 Isaias Olmedo MD    Last fill: folic acid 1 mg  3/24/2025  270 tabs,     Labs:   Lab Results   Component Value Date    CREATSERUM 0.70 03/10/2025     11/15/2016    ALKPHO 75 03/10/2025    AST 21 03/10/2025    ALT 27 03/10/2025    BILT 0.7 03/10/2025    TP 7.1 03/10/2025    ALB 4.6 03/10/2025       Lab Results   Component Value Date    WBC 10.3 04/03/2025    HGB 12.6 04/03/2025    .0 (H) 04/03/2025    NEPRELIM 6.71 04/03/2025    NEPERCENT 64.9 04/03/2025    LYPERCENT 20.4 04/03/2025    NE 6.71 04/03/2025    LYMABS 2.11 04/03/2025

## 2025-04-18 NOTE — PROGRESS NOTES
Breast Surgery Surveillance Visit    Diagnosis: DCIS, left breast; ER negative, RI negative status post Left lumpectomy on December 12, 2016    Stage:   Cancer Staging   History of ductal carcinoma in situ (DCIS) of breast  Staging form: Breast, AJCC V7  - Clinical stage from 11/10/2016: Stage Unknown (Tis (DCIS), NX, M0) - Signed by Barbra Curtis APRN on 7/11/2019  - Pathologic stage from 12/17/2016: Stage Unknown (Tis (DCIS), NX, cM0) - Signed by Barbra Curtis APRN on 7/11/2019    Disease Status:  Surgical treatment complete, Radiation therapy completed March 2017.     History of Present Illness:   Ms. Mónica Wu is a 69 year old woman who presented with a right imaging detected breast mass/calcifications in November 2015. She was referred for diagnostic imaging which is detailed below. She denies any palpable masses, nipple discharge, skin changes or axillary adenopathy. She does not have breast pain. She does not have significant past history for breast diseases or breast biopsy. She does have family history of breast cancer.     The patient has been compliant with annual screenings. She presented for Bilateral Screening Mammogram on October 26, 2015 which showed scattered fibroglandular densities (25-50% glandular) with no changes on the Left but with a focal asymmetry in the lateral right breast. She returned on November 6, 2015 for Right Diagnostic imaging that confirmed the asymmetry dissipates with compression but targeted ultrasound demonstrated a 5 mm hypoechoic possibly solid nodule at 9:00 for which ultrasound guided biopsy was recommended. Biopsy was performed and confirmed fibrocystic changes with no atypia or malignancy. As surveillance she underwent Right Diagnostic surveillance on May 19, 2016 which showed biopsy changes with no new concerns. Subsequent screening was performed in October 2016 which showed new calcifications and asymmetric density in the left breast requiring  additional imaging. The additional imaging confirmed a cluster of new pleomorphic calcifications at the 2:00 position of the left breast anterior depth for which stereotactic guided biopsy was recommended and performed. This confirmed DCIS, Grade 3, ER/NM negative. She underwent lumpectomy without complication and has had no new concerns related to the breast. She completed RT without complication.  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 She has no new concerns related to bilateral breasts on exam today. She is here today for evaluation and recommendations for further therapy.        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    She has no cumulative breastfeeding history.  She achieved menarche at age 15  Age of Menopause: 55  Type: Natural menopause  She has no history of hormone replacement therapy.  She has no history of oral contraceptive use.  She denies infertility treatment to achieve pregnancy.    Medications:    Current Outpatient  Medications on File Prior to Visit   Medication Sig Dispense Refill    Acetaminophen (TYLENOL ARTHRITIS PAIN OR) Take 650 mg by mouth in the morning and 650 mg before bedtime. (Patient taking differently: Take 1,300 mg by mouth in the morning and 1,300 mg before bedtime.)      allopurinol 300 MG Oral Tab Take 1 tablet (300 mg total) by mouth daily. 90 tablet 3    methotrexate 2.5 MG Oral Tab Take 10 tablets (25 mg total) by mouth once a week. 130 tablet 0    timolol 0.5 % Ophthalmic Solution       amoxicillin clavulanate 875-125 MG Oral Tab Take 1 tablet by mouth 2 (two) times daily for 10 days. 20 tablet 0    ipratropium 0.06 % Nasal Solution 2 sprays by Nasal route 3 (three) times daily. 15 mL 5    colchicine 0.6 MG Oral Tab Take 1 tablet (0.6 mg total) by mouth 2 (two) times daily. 180 tablet 1    Irbesartan 150 MG Oral Tab Take 1 tablet (150 mg total) by mouth daily. 90 tablet 3    Vitamin K 100 MCG Oral Tab       Vitamin D, Cholecalciferol, 50 MCG (2000 UT) Oral Cap       latanoprost 0.005 % Ophthalmic Solution INSTILL 1 DROP INTO AFFECTED EYES DAILY AT BEDTIME      acetaminophen 500 MG Oral Tab Take 1 tablet (500 mg total) by mouth every 6 (six) hours as needed for Pain. 750 mg twice a day (tylenol arthritis) (Patient not taking: Reported on 4/16/2025)      ibuprofen 200 MG Oral Tab Take 1 tablet (200 mg total) by mouth every 6 (six) hours as needed for Pain. (Patient not taking: Reported on 4/16/2025)      Multiple Vitamin (ONE-DAILY MULTI VITAMINS) Oral Tab Take 1 tablet by mouth daily.       No current facility-administered medications on file prior to visit.       Allergies:    Allergies   Allergen Reactions    Wellbutrin [Bupropion] OTHER (SEE COMMENTS)     Tremors/shakes        Family History   Problem Relation Age of Onset    Breast Cancer Mother 35    Breast Cancer Sister 51-She tested BRCA negative    Cancer Maternal Grandmother      unknown   She does not know if she is of Ashkenazi Worship  ancestry.    Social History:  History   Alcohol Use No     History   Smoking Status    Every Day    Types: Cigarettes   Smokeless Tobacco    Never   The patient is . She has one child. She is employed part time in payroll.     Review of Systems:  General:   The patient denies, fever, chills, night sweats, fatigue, generalized weakness, change in appetite or weight loss.    HEENT:     The patient denies eye irritation, cataracts, redness, glaucoma, yellowing of the eyes, change in vision or color blindness. The patient denies +hearing loss, ringing in the ears, ear drainage, earaches, nasal congestion, nose bleeds, snoring, pain in mouth/throat, hoarseness, change in voice, facial trauma.    Respiratory:  The patient denies chronic cough, phlegm, hemoptysis, pleurisy/chest pain, pneumonia, asthma, wheezing, difficulty in breathing with exertion, emphysema, chronic bronchitis, shortness of breast or abnormal sound when breathing.     Cardiovascular:  There is no history of chest pain, chest pressure/discomfort, palpitations, irregular heartbeat, fainting or near-fainting, difficulty breathing when lying flat, SOB/Coughing at night, swelling of the legs or chest pain while walking.    Breasts:  See history of present illness    Gastrointestinal:     There is no history of difficulty or pain with swallowing, reflux symptoms, vomiting, dark or bloody stools, constipation, yellowing of the skin, indigestion, nausea, change in bowel habits, diarrhea, abdominal pain or vomiting blood.     Genitourinary:  The patient denies frequent urination, needing to get up at night to urinate, urinary hesitancy or retaining urine, painful urination, urinary incontinence, decreased urine stream, blood in the urine or vaginal/penile discharge.    Skin:    The patient denies rash, itching, skin lesions, dry skin, change in skin color or change in moles.     Hematologic/Lymphatic:  The patient denies easily bruising or bleeding or  persistent swollen glands or lymph nodes.     Musculoskeletal:  The patient denies muscle aches/pain, joint pain, stiff joints, neck pain, back pain or bone pain.    Neuropsychiatric:  There is no history of migraines or severe headaches, seizure/epilepsy, speech problems, coordination problems, trembling/tremors, fainting/black outs, dizziness, memory problems, loss of sensation/numbness, problems walking, weakness, tingling or burning in hands/feet. There is no history of abusive relationship, bipolar disorder, sleep disturbance, anxiety, depression or feeling of despair.    Endocrine:    There is no history of poor/slow wound healing, weight loss/gain, fertility or hormone problems, cold intolerance, thyroid disease.     Allergic/Immunologic:  There is no history of hives, hay fever, angioedema or anaphylaxis.    /81 (BP Location: Right arm, Patient Position: Sitting, Cuff Size: adult)   Temp 97 °F (36.1 °C) (Temporal)   Resp 16   Wt 48.5 kg (107 lb)   SpO2 98%   BMI 19.57 kg/m²     Physical Examination:  This is a well-nourished, alert and oriented woman. There is not palpable cervical, supraclavicular or axillary adenopathy.  The neck is supple with a midline trachea and no thyromegaly. Range of motion is good at both shoulders. Breasts are symmetrical. The tumorectomy site is in the upper outer left  breast and shows no evidence of local recurrence. Both nipples are everted with no discharge. There is not dominant masses, focal nodularity or skin changes on either side. The abdomen is soft, flat and nontender, with no palpable masses or organomegaly.    Imaging:  Most recent imaging was a bilateral screening mammogram in February 2025 which showed no suspicious findings.  High risk screening MRI in August 2024 showed no evidence of malignancy bilaterally.  Impression:   Ms. Mónica Wu is a 69 year old woman presents s/p benign Right breast biopsy, and Left lumpectomy for Left breast DCIS  with a family history of breast cancer and a confirmed CHEK2 deleterious gene mutation.    Discussion and Plan: I had a discussion with the Patient regarding her breast exam. On exam today, she is healing well with no evidence of new or recurrent disease. I personally reviewed her imaging which is unremarkable.  As she is ER/TN negative, she will not benefit from endocrine therapy.  We discussed the significance and implications of her genetic findings and the need for high risk evaluation in this setting.  She will be due for a high risk screening MRI in August 2025.  She will be due for a bilateral screening mammogram in February 2026.  She will follow up in 1 year for a clinical exam. She was encouraged to contact the office with any questions or concerns prior to her next scheduled appointment.

## 2025-04-24 ENCOUNTER — HOSPITAL ENCOUNTER (OUTPATIENT)
Dept: CT IMAGING | Facility: HOSPITAL | Age: 70
Discharge: HOME | End: 2025-04-24
Attending: PHYSICIAN ASSISTANT
Payer: MEDICARE

## 2025-04-24 DIAGNOSIS — Z12.2 SCREENING FOR LUNG CANCER: ICD-10-CM

## 2025-04-24 DIAGNOSIS — Z87.891 PERSONAL HISTORY OF NICOTINE DEPENDENCE: ICD-10-CM

## 2025-04-24 DIAGNOSIS — R91.8 MULTIPLE LUNG NODULES ON CT: ICD-10-CM

## 2025-04-24 PROCEDURE — 71271 CT THORAX LUNG CANCER SCR C-: CPT | Performed by: PHYSICIAN ASSISTANT

## 2025-05-13 ENCOUNTER — TELEPHONE (OUTPATIENT)
Age: 70
End: 2025-05-13

## 2025-06-04 NOTE — PROGRESS NOTES
HPI:     Mónica Wu is a 69 year old female with a PMH of HTN, breast ca.    Following for:  1. 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 Gross hematuria   3. Cystitis    PCP - Shane  LOV 12/11/2024    Presents for check-up, cysto, discuss next steps.  Has chronic right knee stiffness and pain.    She feels good. Appetite and energy are good. No frequency, urgency, hematuria, dysuria.    UA is negative    Reported ~ 8 oz water, > 100 oz coffee, 8 oz tea with light yellow urine. Now 40 water, 40 coffee, tea with light yellow urine.    UTI hx: none  Tobacco hx: > 40 pack years, 1 PPD  Kidney stone hx: none  Fam h/o  malignancy: sister kidney cancer (non-smoker)    Cysto today: s/p TUR on posterior wall with well-healed scar. No abnormalities. Cystitis has improved.    MRA 7/25/24: hepatic hemangioma and simple liver cysts   CTU 6/3/24: new 8 x 6 mm liver lesion, probable hemangioma but MR liver recommended for further eval  CT AP + IVC 2/22/23: bladder mass near right trigone (> 2 cm) with posterior bladder wall thickening    Have discussed that this puts the patient in the intermediate risk group for NMIBC.  We reviewed the NCCN guidelines for follow-up for intermediate risk NMIBC, which includes:  - cysto with urine cytology at 3, 6, 12, 18, 24 mo, then annually up to 5 y from diagnosis, then as indicated  - baseline upper tract imaging, then as indicated    Discussed options and will plan for MR liver protocol per Rads recs and would defer to PCP if any further w/u or f/u is required.    Recommend she continue to drink plenty of water to help with mild cystitis changes. Will plan for cytology today and cysto with urine cytology in  1 y.    PROCEDURE NOTE    PROCEDURE PERFORMED: Flexible Cystoscopy    After informed consent and urinalysis was obtained, patient was placed in the modified lithotomy position and all pressure points were padded. She was prepped  and draped in the usual sterile fashion using Betadine.   A 16 Hebrew flexible scope was passed through the urethra, and the bladder was entered and examined in its entirety.     Findings: as noted above    The patient tolerated the procedure well, suffered no complications, was able to void spontaneously after completion of the procedure in the office, and left the office in good condition.    Shwetha-procedural antibiotics were given.  _____________________    Prior note:    Cysto today: large (~ 4 cm) tumor over the right ureteral orifice, papillary. Left ureteral orifice is normal, uninvolved. 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    High blood pressure    Pain in joints    Ankle and knee inflammation    Smoker    Visual impairment    glasses    Wears glasses      Past Surgical History:   Procedure Laterality Date    Cataract  24  & 24    Colonoscopy      Lumpectomy left Left 2016    DCIS    Diya biopsy stereo nodule 1 site left (cpt=19081)  2016    DCIS    Needle biopsy right Right 2015    US guided bx - benign          Radiation left Left 2017    lt lump with rad      Family History   Problem Relation Age of Onset    DCIS Self     Breast Cancer Self 59    Breast Cancer Mother 35    Heart Attack Father     Breast Cancer Sister 49    Cancer Sister     Cancer Sister 60        Kidney    Breast Cancer Sister     Arthritis Sister     Heart Attack Brother     Cancer Maternal Grandmother         unknown       Social History:   Social History     Socioeconomic History    Marital status:    Occupational History    Occupation:      Comment: part time   Tobacco Use    Smoking status: Every Day     Current packs/day: 1.00     Average packs/day: 1 pack/day for 41.0 years (41.0 ttl pk-yrs)     Types: Cigarettes     Passive exposure: Current    Smokeless tobacco: Never   Vaping Use    Vaping status: Never Used   Substance and Sexual Activity    Alcohol use: No    Drug use: No    Sexual activity: Never   Other Topics Concern    Caffeine Concern No    Exercise No    Seat Belt No    Special Diet No    Stress Concern No    Weight Concern No   Social History Narrative    Lives in [place] with     Prefers treatment here in [place]     Social Drivers of Health      Received from [facility]        Medications (Active prior to today's visit):  Current Outpatient Medications   Medication Sig Dispense Refill    folic acid 1 MG Oral Tab Take 3 tablets (3 mg total) by mouth daily. 270 tablet 3    Acetaminophen (TYLENOL ARTHRITIS PAIN OR) Take 650 mg by mouth in the morning and 650 mg before bedtime. (Patient taking differently: Take 1,300 mg by mouth in the morning and 1,300 mg before bedtime.)      allopurinol 300 MG Oral Tab Take 1 tablet (300 mg total) by mouth daily. 90 tablet 3    methotrexate 2.5 MG Oral Tab Take 10 tablets (25 mg total) by mouth once a week. 130 tablet 0    timolol 0.5 % Ophthalmic Solution       ipratropium 0.06 % Nasal Solution 2 sprays by Nasal route 3 (three) times daily. 15 mL 5    colchicine 0.6 MG Oral Tab Take 1 tablet (0.6 mg total) by mouth 2 (two) times daily. 180 tablet 1    Irbesartan 150 MG Oral Tab Take 1 tablet (150 mg total) by mouth daily. 90 tablet 3    Vitamin K 100 MCG Oral Tab       Vitamin D, Cholecalciferol, 50 MCG (2000 UT) Oral Cap       latanoprost 0.005 % Ophthalmic Solution INSTILL 1 DROP INTO AFFECTED EYES DAILY AT BEDTIME       acetaminophen 500 MG Oral Tab Take 1 tablet (500 mg total) by mouth every 6 (six) hours as needed for Pain. 750 mg twice a day (tylenol arthritis) (Patient not taking: Reported on 4/16/2025)      ibuprofen 200 MG Oral Tab Take 1 tablet (200 mg total) by mouth every 6 (six) hours as needed for Pain. (Patient not taking: Reported on 4/16/2025)      Multiple Vitamin (ONE-DAILY MULTI VITAMINS) Oral Tab Take 1 tablet by mouth daily.         Allergies:  Allergies   Allergen Reactions    Wellbutrin [Bupropion] OTHER (SEE COMMENTS)     Tremors/shakes         ROS:     A comprehensive 10 point review of systems was completed.  Pertinent positives and negatives noted in the the HPI.    PHYSICAL EXAM:     GENERAL APPEARANCE: well, developed, well nourished, in no acute distress  NEUROLOGIC: nonfocal, alert and oriented  HEAD: normocephalic, atraumatic  EYES: sclera non-icteric  EARS: hearing intact  ORAL CAVITY: mucosa moist  NECK/THYROID: no obvious goiter or masses  LUNGS: nonlabored breathing  ABDOMEN: soft, no obvious masses or tenderness  SKIN: no obvious rashes    : as noted above     ASSESSMENT/PLAN:   Diagnoses and all orders for this visit:    Malignant neoplasm of lateral wall of urinary bladder (HCC)  -     URINALYSIS, AUTO, W/O SCOPE  -     sulfamethoxazole-trimethoprim DS (Bactrim DS) 800-160 MG per tab 1 tablet  -     Cytology fluids; Future    Cystitis  -     URINALYSIS, AUTO, W/O SCOPE    Gross hematuria    - as noted above.    Thanks again for this consult.    Dandre Rdz MD, FACS  Urologist  [facility]  Office: [phone]

## 2025-06-05 ENCOUNTER — LAB ENCOUNTER (OUTPATIENT)
Dept: LAB | Age: 70
End: 2025-06-05
Attending: INTERNAL MEDICINE
Payer: MEDICARE

## 2025-06-05 DIAGNOSIS — M81.0 AGE-RELATED OSTEOPOROSIS WITHOUT CURRENT PATHOLOGICAL FRACTURE: ICD-10-CM

## 2025-06-05 DIAGNOSIS — M1A.09X1 IDIOPATHIC CHRONIC GOUT OF MULTIPLE SITES WITH TOPHUS: ICD-10-CM

## 2025-06-05 LAB
ALBUMIN SERPL-MCNC: 4.5 G/DL (ref 3.2–4.8)
ALBUMIN/GLOB SERPL: 1.7 {RATIO} (ref 1–2)
ALP LIVER SERPL-CCNC: 95 U/L (ref 55–142)
ALT SERPL-CCNC: 15 U/L (ref 10–49)
ANION GAP SERPL CALC-SCNC: 7 MMOL/L (ref 0–18)
AST SERPL-CCNC: 16 U/L (ref ?–34)
BASOPHILS # BLD AUTO: 0.08 X10(3) UL (ref 0–0.2)
BASOPHILS NFR BLD AUTO: 0.8 %
BILIRUB SERPL-MCNC: 0.5 MG/DL (ref 0.2–1.1)
BUN BLD-MCNC: 8 MG/DL (ref 9–23)
CALCIUM BLD-MCNC: 9.8 MG/DL (ref 8.7–10.6)
CHLORIDE SERPL-SCNC: 104 MMOL/L (ref 98–112)
CK SERPL-CCNC: 46 U/L (ref 34–145)
CO2 SERPL-SCNC: 28 MMOL/L (ref 21–32)
CREAT BLD-MCNC: 0.63 MG/DL (ref 0.55–1.02)
EGFRCR SERPLBLD CKD-EPI 2021: 97 ML/MIN/1.73M2 (ref 60–?)
EOSINOPHIL # BLD AUTO: 0.16 X10(3) UL (ref 0–0.7)
EOSINOPHIL NFR BLD AUTO: 1.6 %
ERYTHROCYTE [DISTWIDTH] IN BLOOD BY AUTOMATED COUNT: 14.9 %
FASTING STATUS PATIENT QL REPORTED: YES
GLOBULIN PLAS-MCNC: 2.6 G/DL (ref 2–3.5)
GLUCOSE BLD-MCNC: 147 MG/DL (ref 70–99)
HCT VFR BLD AUTO: 36.6 % (ref 35–48)
HGB BLD-MCNC: 12.2 G/DL (ref 12–16)
IMM GRANULOCYTES # BLD AUTO: 0.03 X10(3) UL (ref 0–1)
IMM GRANULOCYTES NFR BLD: 0.3 %
LYMPHOCYTES # BLD AUTO: 2.67 X10(3) UL (ref 1–4)
LYMPHOCYTES NFR BLD AUTO: 27 %
MAGNESIUM SERPL-MCNC: 2 MG/DL (ref 1.6–2.6)
MCH RBC QN AUTO: 32.4 PG (ref 26–34)
MCHC RBC AUTO-ENTMCNC: 33.3 G/DL (ref 31–37)
MCV RBC AUTO: 97.3 FL (ref 80–100)
MONOCYTES # BLD AUTO: 0.6 X10(3) UL (ref 0.1–1)
MONOCYTES NFR BLD AUTO: 6.1 %
NEUTROPHILS # BLD AUTO: 6.36 X10 (3) UL (ref 1.5–7.7)
NEUTROPHILS # BLD AUTO: 6.36 X10(3) UL (ref 1.5–7.7)
NEUTROPHILS NFR BLD AUTO: 64.2 %
OSMOLALITY SERPL CALC.SUM OF ELEC: 289 MOSM/KG (ref 275–295)
PHOSPHATE SERPL-MCNC: 4.3 MG/DL (ref 2.4–5.1)
PLATELET # BLD AUTO: 454 10(3)UL (ref 150–450)
POTASSIUM SERPL-SCNC: 4.3 MMOL/L (ref 3.5–5.1)
PROT SERPL-MCNC: 7.1 G/DL (ref 5.7–8.2)
RBC # BLD AUTO: 3.76 X10(6)UL (ref 3.8–5.3)
SODIUM SERPL-SCNC: 139 MMOL/L (ref 136–145)
URATE SERPL-MCNC: 3.1 MG/DL (ref 3.1–7.8)
VIT D+METAB SERPL-MCNC: 47.4 NG/ML (ref 30–100)
WBC # BLD AUTO: 9.9 X10(3) UL (ref 4–11)

## 2025-06-05 PROCEDURE — 84550 ASSAY OF BLOOD/URIC ACID: CPT

## 2025-06-05 PROCEDURE — 83735 ASSAY OF MAGNESIUM: CPT

## 2025-06-05 PROCEDURE — 80053 COMPREHEN METABOLIC PANEL: CPT

## 2025-06-05 PROCEDURE — 36415 COLL VENOUS BLD VENIPUNCTURE: CPT

## 2025-06-05 PROCEDURE — 85025 COMPLETE CBC W/AUTO DIFF WBC: CPT

## 2025-06-05 PROCEDURE — 82306 VITAMIN D 25 HYDROXY: CPT

## 2025-06-05 PROCEDURE — 84100 ASSAY OF PHOSPHORUS: CPT

## 2025-06-05 PROCEDURE — 82550 ASSAY OF CK (CPK): CPT

## 2025-06-11 ENCOUNTER — PROCEDURE (OUTPATIENT)
Dept: SURGERY | Facility: CLINIC | Age: 70
End: 2025-06-11
Payer: MEDICARE

## 2025-06-11 DIAGNOSIS — R31.0 GROSS HEMATURIA: ICD-10-CM

## 2025-06-11 DIAGNOSIS — N30.90 CYSTITIS: ICD-10-CM

## 2025-06-11 DIAGNOSIS — C67.2 MALIGNANT NEOPLASM OF LATERAL WALL OF URINARY BLADDER (HCC): Primary | ICD-10-CM

## 2025-06-11 LAB
APPEARANCE: CLEAR
BILIRUBIN: NEGATIVE
GLUCOSE (URINE DIPSTICK): NEGATIVE MG/DL
KETONES (URINE DIPSTICK): NEGATIVE MG/DL
MULTISTIX LOT#: ABNORMAL NUMERIC
NITRITE, URINE: NEGATIVE
OCCULT BLOOD: NEGATIVE
PH, URINE: 6.5 (ref 4.5–8)
PROTEIN (URINE DIPSTICK): NEGATIVE MG/DL
SPECIFIC GRAVITY: 1.01 (ref 1–1.03)
URINE-COLOR: YELLOW
UROBILINOGEN,SEMI-QN: 0.2 MG/DL (ref 0–1.9)

## 2025-06-11 PROCEDURE — 52000 CYSTOURETHROSCOPY: CPT | Performed by: UROLOGY

## 2025-06-11 PROCEDURE — 81003 URINALYSIS AUTO W/O SCOPE: CPT | Performed by: UROLOGY

## 2025-06-11 PROCEDURE — 99213 OFFICE O/P EST LOW 20 MIN: CPT | Performed by: UROLOGY

## 2025-06-11 RX ORDER — SULFAMETHOXAZOLE AND TRIMETHOPRIM 800; 160 MG/1; MG/1
1 TABLET ORAL ONCE
Status: COMPLETED | OUTPATIENT
Start: 2025-06-11 | End: 2025-06-11

## 2025-06-11 RX ADMIN — SULFAMETHOXAZOLE AND TRIMETHOPRIM 1 TABLET: 800; 160 TABLET ORAL at 09:00:00

## 2025-06-20 ENCOUNTER — OFFICE VISIT (OUTPATIENT)
Age: 70
End: 2025-06-20
Attending: INTERNAL MEDICINE
Payer: MEDICARE

## 2025-06-20 VITALS
HEIGHT: 62.01 IN | RESPIRATION RATE: 16 BRPM | HEART RATE: 76 BPM | WEIGHT: 105 LBS | BODY MASS INDEX: 19.08 KG/M2 | OXYGEN SATURATION: 97 % | TEMPERATURE: 98 F | SYSTOLIC BLOOD PRESSURE: 146 MMHG | DIASTOLIC BLOOD PRESSURE: 69 MMHG

## 2025-06-20 DIAGNOSIS — M81.0 SENILE OSTEOPOROSIS: Primary | ICD-10-CM

## 2025-06-20 RX ORDER — ZOLEDRONIC ACID 0.05 MG/ML
5 INJECTION, SOLUTION INTRAVENOUS ONCE
Status: COMPLETED | OUTPATIENT
Start: 2025-06-20 | End: 2025-06-20

## 2025-06-20 RX ORDER — ZOLEDRONIC ACID 0.05 MG/ML
5 INJECTION, SOLUTION INTRAVENOUS ONCE
Status: CANCELLED
Start: 2025-06-20 | End: 2025-06-20

## 2025-06-20 RX ADMIN — ZOLEDRONIC ACID 5 MG: 0.05 INJECTION, SOLUTION INTRAVENOUS at 13:11:00

## 2025-06-20 NOTE — PROGRESS NOTES
Education Record    Learner:  Patient    Disease / Diagnosis:    Barriers / Limitations:  None   Comments:    Method:  Brief focused   Comments:    General Topics:  Plan of care reviewed   Comments:    Outcome:  Shows understanding   Comments:    Reclast infusion - tolerated without issue. Labs done 6/5, ok to use per pharmacy.

## 2025-07-10 NOTE — PROGRESS NOTES
Noted.  Will discuss with pt at [address] today. When I called the number was sent for me to call and press 2 it keeps redirecting me a free cellphone or 911 alert.

## 2025-08-21 DIAGNOSIS — Z51.81 THERAPEUTIC DRUG MONITORING: ICD-10-CM

## 2025-08-21 DIAGNOSIS — M06.00 SERONEGATIVE RHEUMATOID ARTHRITIS (HCC): ICD-10-CM

## 2025-08-21 DIAGNOSIS — M1A.09X1 IDIOPATHIC CHRONIC GOUT OF MULTIPLE SITES WITH TOPHUS: Primary | ICD-10-CM

## 2025-08-21 RX ORDER — METHOTREXATE 2.5 MG/1
TABLET ORAL
Qty: 130 TABLET | Refills: 0 | Status: SHIPPED | OUTPATIENT
Start: 2025-08-21

## (undated) DIAGNOSIS — I10 ESSENTIAL HYPERTENSION: ICD-10-CM

## (undated) DEVICE — SOLUTION  .9 3000ML

## (undated) DEVICE — BAG DRAIN INFECTION CNTRL 2000

## (undated) DEVICE — HF-RESECTION ELECTRODE PLASMALOOP LOOP, MEDIUM, 24 FR., 12°/16°, ESG TURIS: Brand: OLYMPUS

## (undated) DEVICE — STERILE SYNTHETIC POLYISOPRENE POWDER-FREE SURGICAL GLOVES WITH HYDROGEL COATING: Brand: PROTEXIS

## (undated) DEVICE — SLEEVE KENDALL SCD EXPRESS MED

## (undated) DEVICE — SYRINGE,TOOMEY,IRRIGATION,70CC,STERILE: Brand: MEDLINE

## (undated) DEVICE — CATH BARDEX IC FOLEY 18FR 5CC

## (undated) DEVICE — STERILE WATER 1000ML BTL

## (undated) DEVICE — SYRINGE 30ML LL TIP

## (undated) DEVICE — CYSTO CDS-LF: Brand: MEDLINE INDUSTRIES, INC.

## (undated) DEVICE — CATH URET CONE TIP 8FR 138008

## (undated) DEVICE — GAUZE TRAY STERILE 4X4 12PLY

## (undated) DEVICE — MEGADYNE ELECTRODE ADULT PT RT

## (undated) NOTE — MR AVS SNAPSHOT
[facility] [address] [place] [address]  [address] [facility] [address] [phone]               Thank you for choosing us for your health care visit with RAMILA Tomlin. We are glad to serve you and happy to provide you with this summary of your visit. Earaches can happen without an infection. This occurs when air and fluid build up behind the eardrum causing a feeling of fullness and discomfort and reduced hearing. This is called otitis media with effusion (OME) or serous otitis media.  It means there is · You may use over-the-counter decongestants such as phenylephrine or pseudoephedrine. But they are not always helpful. Don't use nasal spray decongestants more than 3 days. Longer use can make congestion worse.  Prescription nasal sprays from your doctor d Call the [employer] for assistance with your inactive [employer] account    If you have questions, you can call [phone] to talk to our [facility] Staff. Remember, [employer] is NOT to be used for urgent needs. For medical emergencies, dial 911.     Vi

## (undated) NOTE — IP AVS SNAPSHOT
After Visit Summary   1/26/2017    Nichelle Ayala    MRN: [id number]           Diagnoses this Visit     Ductal carcinoma in situ (DCIS) of left breast    -  Primary       Allergies     No Known Allergies      Your Vital Signs Were     BP Pulse T

## (undated) NOTE — LETTER
12/14/18        Hong Friedman  [address] [facility] [address]      Dear Polina Gonzalez,    [facility] records indicate that you have outstanding lab work and or testing that was ordered for you and has not yet been completed:  Orders Placed This Encounter

## (undated) NOTE — MR AVS SNAPSHOT
[facility] [address], [address] [place] [address]                    After Visit Summary   6/23/2017    Aysha Oconnell    MRN: [id number]           Visit Information        Provider Department Dept Phone    6/ Support Staff. Remember, [employer] is NOT to be used for urgent needs. For medical emergencies, dial 911. Visit [email or web address] to learn more.

## (undated) NOTE — Clinical Note
Put in an order for Reclast at the end of June 2025.  Last Reclast 6/19/2024.  Please call the patient and make sure that she had a recent dental exam in the last several months or so to ensure she needs no extractions before Reclast.

## (undated) NOTE — IP AVS SNAPSHOT
After Visit Summary   3/6/2017    Nichellecelio Ayala    MRN: [id number]           Allergies     No Known Allergies      Your Vital Signs Were     Smoking Status                   Current Every Day Smoker                     Patient Instructions    M

## (undated) NOTE — IP AVS SNAPSHOT
After Visit Summary   3/30/2017    Kory Cass    MRN: [id number]           Diagnoses this Visit     Ductal carcinoma in situ (DCIS) of left breast    -  Primary       Allergies     No Known Allergies      Your Vital Signs Were     BP Temp(Sr

## (undated) NOTE — LETTER
02/28/20        [address]  [id number]      Dear Rod Stallworth,    [facility] records indicate that you have outstanding lab work and or testing that was ordered for you and has not yet been completed:  Orders Placed This Encount